# Patient Record
Sex: MALE | Race: WHITE | NOT HISPANIC OR LATINO | Employment: OTHER | URBAN - NONMETROPOLITAN AREA
[De-identification: names, ages, dates, MRNs, and addresses within clinical notes are randomized per-mention and may not be internally consistent; named-entity substitution may affect disease eponyms.]

---

## 2017-01-12 ENCOUNTER — OFFICE VISIT (OUTPATIENT)
Dept: INTERNAL MEDICINE | Facility: CLINIC | Age: 72
End: 2017-01-12

## 2017-01-12 VITALS
DIASTOLIC BLOOD PRESSURE: 68 MMHG | WEIGHT: 221 LBS | TEMPERATURE: 97.8 F | OXYGEN SATURATION: 96 % | BODY MASS INDEX: 30.94 KG/M2 | HEIGHT: 71 IN | HEART RATE: 42 BPM | SYSTOLIC BLOOD PRESSURE: 120 MMHG

## 2017-01-12 DIAGNOSIS — I10 ESSENTIAL HYPERTENSION: ICD-10-CM

## 2017-01-12 DIAGNOSIS — K21.9 GASTROESOPHAGEAL REFLUX DISEASE WITHOUT ESOPHAGITIS: ICD-10-CM

## 2017-01-12 DIAGNOSIS — Z51.81 ENCOUNTER FOR THERAPEUTIC DRUG LEVEL MONITORING: ICD-10-CM

## 2017-01-12 DIAGNOSIS — G47.00 INSOMNIA, UNSPECIFIED TYPE: ICD-10-CM

## 2017-01-12 DIAGNOSIS — Z02.83 ENCOUNTER FOR DRUG SCREENING: Primary | ICD-10-CM

## 2017-01-12 DIAGNOSIS — F41.1 GENERALIZED ANXIETY DISORDER: ICD-10-CM

## 2017-01-12 DIAGNOSIS — E78.5 HYPERLIPIDEMIA, UNSPECIFIED HYPERLIPIDEMIA TYPE: ICD-10-CM

## 2017-01-12 PROCEDURE — 96127 BRIEF EMOTIONAL/BEHAV ASSMT: CPT | Performed by: INTERNAL MEDICINE

## 2017-01-12 PROCEDURE — 99214 OFFICE O/P EST MOD 30 MIN: CPT | Performed by: INTERNAL MEDICINE

## 2017-01-12 NOTE — MR AVS SNAPSHOT
Tristan Rafy   1/12/2017 1:00 PM   Office Visit    Provider:  Marilyn K Vermeesch, MD   Department:  [facility] PRIMARY CARE   Dept Phone:  [phone]                Your Full Care Plan              Your Updated Medication List          This list is accurate as of: 1/12/17  1:37 PM.  Always use your most recent med list.                ALPRAZolam 0.25 MG tablet   Commonly known as:  XANAX   Take 1 tablet by mouth daily as needed for anxiety.       amLODIPine 10 MG tablet   Commonly known as:  NORVASC   take 1 tablet by mouth once daily       citalopram 40 MG tablet   Commonly known as:  CeleXA   Take 1 tablet by mouth daily.       clopidogrel 75 MG tablet   Commonly known as:  PLAVIX   Take 1 tablet by mouth Daily.       CRESTOR 20 MG tablet   Generic drug:  rosuvastatin       fluticasone 50 MCG/ACT nasal spray   Commonly known as:  FLONASE       isosorbide mononitrate 60 MG 24 hr tablet   Commonly known as:  IMDUR   take 1 tablet by mouth once daily       lisinopril 10 MG tablet   Commonly known as:  PRINIVIL,ZESTRIL   Take 1 tablet by mouth Daily.       metoprolol tartrate 25 MG tablet   Commonly known as:  LOPRESSOR   take 1 tablet by mouth twice a day       NITROSTAT 0.4 MG SL tablet   Generic drug:  nitroglycerin       omeprazole 20 MG capsule   Commonly known as:  priLOSEC   take 1 capsule by mouth once daily       zolpidem 5 MG tablet   Commonly known as:  AMBIEN   Take 1 tablet by mouth at night as needed for sleep.               You Were Diagnosed With        Codes Comments    Encounter for drug screening    -  Primary ICD-10-CM: Z02.83  ICD-9-CM: V72.85     Essential hypertension     ICD-10-CM: I10  ICD-9-CM: 401.9     Hyperlipidemia, unspecified hyperlipidemia type     ICD-10-CM: E78.5  ICD-9-CM: 272.4     Gastroesophageal reflux disease without esophagitis     ICD-10-CM: K21.9  ICD-9-CM: 530.81     Insomnia, unspecified type     ICD-10-CM: G47.00  ICD-9-CM:  "780.52     Generalized anxiety disorder     ICD-10-CM: F41.1  ICD-9-CM: 300.02       Instructions     None    Patient Instructions History      MyChart Signup     Our records indicate that you have declined [facility] signup. If you would like to sign up for [employer], please email [email or web address] or call [phone] to obtain an activation code.             Other Info from Your Visit           Allergies     No Known Allergies      Reason for Visit     Follow-up 2 months for HTN, GERD, and HLD. Pt states BS levels have been running low. Pt agreed to UDS.      Vital Signs     Blood Pressure Pulse Temperature Height Weight Oxygen Saturation    120/68 42 97.8 °F (36.6 °C) 71\" (180.3 cm) 221 lb (100 kg) 96%    Body Mass Index Smoking Status                30.82 kg/m2 Former Smoker          Problems and Diagnoses Noted     Acid reflux disease    Generalized anxiety disorder    High cholesterol or triglycerides    High blood pressure    Difficulty falling or staying asleep    Encounter for drug screening    -  Primary      "

## 2017-01-12 NOTE — PROGRESS NOTES
"Chief Complaint   Patient presents with   • Follow-up     2 months for HTN, GERD, and HLD. Pt states BS levels have been running low. Pt agreed to UDS.     Subjective   Tristan Hillman is a 71 y.o. male.     HPI Comments: Here for followup of anxiety, GERD, HTN, HLD and insomnia.  Labs in May of last year were all in normal range.  PH Q9 score today is 4.  Patient has been having symptoms of depression and difficulty with sleep.  He has known history of insomnia for which he takes Ambien on a regular basis.  He has had kidney stones over the past several weeks.  He has had lithotripsy and is doing better.  He was quite upset and depressed over this for a while.  These sxs are improving since stones are resolved.  He is taking celexa daily.  He is taking xanax about twice a week.  He states that he has occasional BS in the 70s and feels low, shaky.  Usually eats 3 meals a day.   No GERD, is taking prilosec daily.  No CP or SOB. HR is in the 40s.  He states it runs in the 40-60s at home.  He will see cardiologist in March.        The following portions of the patient's history were reviewed and updated as appropriate: allergies, current medications, past family history, past medical history, past social history, past surgical history and problem list.    Review of Systems   Respiratory: Negative for shortness of breath.    Cardiovascular: Negative for chest pain and palpitations.   Genitourinary: Positive for flank pain (recent kidney stones now resolved).   Psychiatric/Behavioral: Positive for dysphoric mood (improved mood over last week or so) and sleep disturbance. The patient is nervous/anxious.    All other systems reviewed and are negative.      Objective   Visit Vitals   • /68   • Pulse (!) 42   • Temp 97.8 °F (36.6 °C)   • Ht 71\" (180.3 cm)   • Wt 221 lb (100 kg)   • SpO2 96%   • BMI 30.82 kg/m2     Body mass index is 30.82 kg/(m^2).  Physical Exam   Constitutional: He is oriented to person, place, and time. " He appears well-developed and well-nourished.   HENT:   Head: Normocephalic and atraumatic.   Mouth/Throat: Oropharynx is clear and moist.   Eyes: Conjunctivae and EOM are normal. Pupils are equal, round, and reactive to light.   Neck: Normal range of motion. Neck supple. No thyromegaly present.   Cardiovascular: Regular rhythm and intact distal pulses.    Systolic murmur grade 1/6, heart rate approximately 45   Pulmonary/Chest: Effort normal and breath sounds normal. No respiratory distress.   Abdominal: Soft. Bowel sounds are normal.   Musculoskeletal: He exhibits no edema.   Lymphadenopathy:     He has no cervical adenopathy.   Neurological: He is alert and oriented to person, place, and time. No cranial nerve deficit.   Psychiatric: He has a normal mood and affect. Judgment normal.   Nursing note and vitals reviewed.      Assessment/Plan   Tristan Hillman is here today and the following problems have been addressed:      Tristan was seen today for follow-up.    Diagnoses and all orders for this visit:    Encounter for drug screening  -     Urine Drug Screen; Future    Essential hypertension    Hyperlipidemia, unspecified hyperlipidemia type    Gastroesophageal reflux disease without esophagitis    Insomnia, unspecified type    Generalized anxiety disorder    UDS done today due to use of xanax and ambien  Labs next visit  Follow HH diet and walk as tolerated  He will discuss HR and use of metoprolol with cardiologist in a few months  No change in celexa for depression at this time  Limit use of Xanax for anxiety and sleep    RTC 6 mo

## 2017-01-13 RX ORDER — ROSUVASTATIN CALCIUM 20 MG/1
TABLET, COATED ORAL
Qty: 90 TABLET | Refills: 1 | Status: SHIPPED | OUTPATIENT
Start: 2017-01-13 | End: 2018-05-08

## 2017-02-15 RX ORDER — NITROGLYCERIN 0.4 MG/1
TABLET SUBLINGUAL
Qty: 25 TABLET | Refills: 5 | Status: SHIPPED | OUTPATIENT
Start: 2017-02-15 | End: 2018-07-07 | Stop reason: SDUPTHER

## 2017-04-10 ENCOUNTER — OFFICE VISIT (OUTPATIENT)
Dept: INTERNAL MEDICINE | Facility: CLINIC | Age: 72
End: 2017-04-10

## 2017-04-10 ENCOUNTER — HOSPITAL ENCOUNTER (OUTPATIENT)
Dept: GENERAL RADIOLOGY | Facility: HOSPITAL | Age: 72
Discharge: HOME | End: 2017-04-10
Attending: INTERNAL MEDICINE | Admitting: INTERNAL MEDICINE

## 2017-04-10 VITALS
HEIGHT: 71 IN | DIASTOLIC BLOOD PRESSURE: 70 MMHG | OXYGEN SATURATION: 98 % | WEIGHT: 229 LBS | SYSTOLIC BLOOD PRESSURE: 128 MMHG | BODY MASS INDEX: 32.06 KG/M2 | TEMPERATURE: 97.5 F | HEART RATE: 47 BPM

## 2017-04-10 DIAGNOSIS — M54.50 CHRONIC LEFT-SIDED LOW BACK PAIN WITHOUT SCIATICA: ICD-10-CM

## 2017-04-10 DIAGNOSIS — R00.1 BRADYCARDIA: Primary | ICD-10-CM

## 2017-04-10 DIAGNOSIS — G89.29 CHRONIC LEFT-SIDED LOW BACK PAIN WITHOUT SCIATICA: ICD-10-CM

## 2017-04-10 DIAGNOSIS — G60.9 IDIOPATHIC PERIPHERAL NEUROPATHY: ICD-10-CM

## 2017-04-10 PROCEDURE — 72100 X-RAY EXAM L-S SPINE 2/3 VWS: CPT

## 2017-04-10 PROCEDURE — 99214 OFFICE O/P EST MOD 30 MIN: CPT | Performed by: INTERNAL MEDICINE

## 2017-04-10 PROCEDURE — 93000 ELECTROCARDIOGRAM COMPLETE: CPT | Performed by: INTERNAL MEDICINE

## 2017-04-10 RX ORDER — GABAPENTIN 300 MG/1
300 CAPSULE ORAL 2 TIMES DAILY
Qty: 60 CAPSULE | Refills: 11 | Status: SHIPPED | OUTPATIENT
Start: 2017-04-10 | End: 2017-05-26

## 2017-04-10 RX ORDER — ZOLPIDEM TARTRATE 5 MG/1
5 TABLET ORAL NIGHTLY PRN
Qty: 30 TABLET | Refills: 2 | Status: SHIPPED | OUTPATIENT
Start: 2017-04-10 | End: 2017-10-26 | Stop reason: SDUPTHER

## 2017-04-10 NOTE — PROGRESS NOTES
"Chief Complaint   Patient presents with   • Dizziness     and light headedness X 4 months. Pt also states both feet have been burning and hurting.      Subjective   Tristan Hillman is a 72 y.o. male.     HPI Comments: He is complaining of burning in feet day and night.   This has been worsening over past few yrs.   He is complaining of LBP also.   He takes advil or aleve for pain prn.  Only uses his ambien prn for sleep and often only uses a half pill.     Dizziness   This is a chronic problem. The current episode started more than 1 month ago. The problem occurs daily. The problem has been waxing and waning. Associated symptoms include numbness. Pertinent negatives include no chest pain or headaches. Associated symptoms comments: none. The symptoms are aggravated by standing and bending. He has tried nothing for the symptoms.        The following portions of the patient's history were reviewed and updated as appropriate: allergies, current medications, past family history, past medical history, past social history, past surgical history and problem list.    Review of Systems   Respiratory: Negative for shortness of breath.    Cardiovascular: Negative for chest pain and palpitations.   Musculoskeletal: Positive for back pain and gait problem.   Neurological: Positive for dizziness and numbness. Negative for headaches.        Numbness and burning of feet       Objective   /70  Pulse (!) 47  Temp 97.5 °F (36.4 °C)  Ht 71\" (180.3 cm)  Wt 229 lb (104 kg)  SpO2 98%  BMI 31.94 kg/m2  Body mass index is 31.94 kg/(m^2).  Physical Exam   Constitutional: He is oriented to person, place, and time. He appears well-developed and well-nourished.   HENT:   Head: Normocephalic and atraumatic.   Mouth/Throat: Oropharynx is clear and moist.   Eyes: Conjunctivae and EOM are normal. Pupils are equal, round, and reactive to light.   Neck: Normal range of motion. Neck supple.   Cardiovascular: Normal rate, regular rhythm, normal " heart sounds and intact distal pulses.    No murmur heard.  Occasional ectopic beat   Pulmonary/Chest: Effort normal and breath sounds normal. No respiratory distress.   Musculoskeletal: He exhibits no edema.   Pain over left sacroiliac joint, pain into left sacroiliac area with left straight leg raise, pain with left hip inversion and eversion into left sacroiliac area, no foot drop, muscle strength normal in lower extremities, sensation grossly intact   Neurological: He is alert and oriented to person, place, and time. No cranial nerve deficit.   Psychiatric: He has a normal mood and affect. Judgment normal.   Nursing note and vitals reviewed.      ECG 12 Lead  Date/Time: 4/10/2017 12:57 PM  Performed by: VERMEESCH, MARILYN K  Authorized by: VERMEESCH, MARILYN K   Comparison: not compared with previous ECG   Rhythm: sinus bradycardia  Rate: bradycardic  BPM: 45  Conduction: conduction normal  ST Segments: ST segments normal  T depression: III and aVF  Other findings: PRWP  Clinical impression: abnormal ECG  Comments: Significant bradycardia noted            Assessment/Plan   Tristan Hillman is here today and the following problems have been addressed:      Tristan was seen today for dizziness.    Diagnoses and all orders for this visit:    Bradycardia    Chronic left-sided low back pain without sciatica  -     XR Spine Lumbar 2 or 3 View; Future    Idiopathic peripheral neuropathy  -     XR Spine Lumbar 2 or 3 View; Future    Other orders  -     gabapentin (NEURONTIN) 300 MG capsule; Take 1 capsule by mouth 2 (Two) Times a Day.  -     zolpidem (AMBIEN) 5 MG tablet; Take 1 tablet by mouth At Night As Needed for Sleep.    Will have patient discontinue metoprolol at this time and monitor blood pressure and heart rate until next visit  If heart rate is not improved he will need to see cardiologist for possible pacemaker placement at that time  X-ray of lumbar spine ordered today  Start gabapentin 300 mg, take 1 at bedtime  for 3 days then increase to twice daily dosing  Refill given on Ambien as noted above  Will call patient with x-ray results and decide on further evaluation based on results    Return to clinic in 6 weeks for follow-up  Please note that portions of this note were completed with a voice recognition program.  Efforts were made to edit dictation, but occasionally words are mistranscribed.

## 2017-04-12 RX ORDER — AMLODIPINE BESYLATE 10 MG/1
TABLET ORAL
Qty: 90 TABLET | Refills: 1 | Status: SHIPPED | OUTPATIENT
Start: 2017-04-12 | End: 2017-11-29 | Stop reason: SDUPTHER

## 2017-04-13 ENCOUNTER — TELEPHONE (OUTPATIENT)
Dept: INTERNAL MEDICINE | Facility: CLINIC | Age: 72
End: 2017-04-13

## 2017-04-13 NOTE — TELEPHONE ENCOUNTER
----- Message from Marilyn K Vermeesch, MD sent at 4/11/2017 12:45 PM EDT -----  Please tell patient that x-ray of his spine reveals severe degenerative disc disease and arthritis with multiple bone spurs with bridging of spurs meaning these are growing together.  If he would like I can order MRI of lumbar spine and possibly afte  r that a referral to neurosurgeon.  We can wait to see how gabapentin is working for his pain and do follow-up visit or try physical therapy if he would like.

## 2017-05-03 RX ORDER — ISOSORBIDE MONONITRATE 60 MG/1
TABLET, EXTENDED RELEASE ORAL
Qty: 90 TABLET | Refills: 1 | Status: SHIPPED | OUTPATIENT
Start: 2017-05-03 | End: 2017-11-02 | Stop reason: SDUPTHER

## 2017-05-26 ENCOUNTER — OFFICE VISIT (OUTPATIENT)
Dept: INTERNAL MEDICINE | Facility: CLINIC | Age: 72
End: 2017-05-26

## 2017-05-26 VITALS
DIASTOLIC BLOOD PRESSURE: 66 MMHG | OXYGEN SATURATION: 98 % | TEMPERATURE: 98.1 F | HEART RATE: 55 BPM | BODY MASS INDEX: 32.13 KG/M2 | WEIGHT: 229.5 LBS | SYSTOLIC BLOOD PRESSURE: 138 MMHG | HEIGHT: 71 IN

## 2017-05-26 DIAGNOSIS — M54.50 CHRONIC LEFT-SIDED LOW BACK PAIN WITHOUT SCIATICA: Primary | ICD-10-CM

## 2017-05-26 DIAGNOSIS — G89.29 CHRONIC LEFT-SIDED LOW BACK PAIN WITHOUT SCIATICA: Primary | ICD-10-CM

## 2017-05-26 DIAGNOSIS — R00.1 BRADYCARDIA: ICD-10-CM

## 2017-05-26 PROCEDURE — 99213 OFFICE O/P EST LOW 20 MIN: CPT | Performed by: INTERNAL MEDICINE

## 2017-05-26 RX ORDER — ALPRAZOLAM 0.25 MG/1
0.25 TABLET ORAL DAILY PRN
Qty: 30 TABLET | Refills: 2 | Status: SHIPPED | OUTPATIENT
Start: 2017-05-26 | End: 2017-12-27 | Stop reason: SDUPTHER

## 2017-08-03 RX ORDER — OMEPRAZOLE 20 MG/1
CAPSULE, DELAYED RELEASE ORAL
Qty: 90 CAPSULE | Refills: 3 | Status: SHIPPED | OUTPATIENT
Start: 2017-08-03 | End: 2018-08-02 | Stop reason: SDUPTHER

## 2017-10-26 RX ORDER — ZOLPIDEM TARTRATE 5 MG/1
TABLET ORAL
Qty: 30 TABLET | Refills: 2 | Status: SHIPPED | OUTPATIENT
Start: 2017-10-26 | End: 2018-05-08 | Stop reason: SDUPTHER

## 2017-11-02 RX ORDER — ISOSORBIDE MONONITRATE 60 MG/1
TABLET, EXTENDED RELEASE ORAL
Qty: 90 TABLET | Refills: 1 | Status: SHIPPED | OUTPATIENT
Start: 2017-11-02 | End: 2017-11-13 | Stop reason: SDUPTHER

## 2017-11-13 RX ORDER — ISOSORBIDE MONONITRATE 60 MG/1
60 TABLET, EXTENDED RELEASE ORAL DAILY
Qty: 90 TABLET | Refills: 0 | Status: SHIPPED | OUTPATIENT
Start: 2017-11-13 | End: 2018-05-08 | Stop reason: SDUPTHER

## 2017-11-13 RX ORDER — ISOSORBIDE MONONITRATE 60 MG/1
TABLET, EXTENDED RELEASE ORAL
Qty: 90 TABLET | Refills: 1 | OUTPATIENT
Start: 2017-11-13

## 2017-11-29 RX ORDER — AMLODIPINE BESYLATE 10 MG/1
10 TABLET ORAL DAILY
Qty: 90 TABLET | Refills: 0 | Status: SHIPPED | OUTPATIENT
Start: 2017-11-29 | End: 2018-03-22 | Stop reason: SDUPTHER

## 2017-11-29 RX ORDER — AMLODIPINE BESYLATE 10 MG/1
TABLET ORAL
Qty: 90 TABLET | Refills: 1 | OUTPATIENT
Start: 2017-11-29

## 2017-12-28 RX ORDER — ALPRAZOLAM 0.25 MG/1
0.25 TABLET ORAL DAILY PRN
Qty: 30 TABLET | Refills: 0 | OUTPATIENT
Start: 2017-12-28 | End: 2018-04-25 | Stop reason: SDUPTHER

## 2017-12-28 RX ORDER — ALPRAZOLAM 0.25 MG/1
TABLET ORAL
Qty: 30 TABLET | Refills: 0 | Status: SHIPPED | OUTPATIENT
Start: 2017-12-28 | End: 2017-12-28 | Stop reason: SDUPTHER

## 2018-03-14 ENCOUNTER — OFFICE VISIT (OUTPATIENT)
Dept: INTERNAL MEDICINE | Facility: CLINIC | Age: 73
End: 2018-03-14

## 2018-03-14 VITALS
TEMPERATURE: 97.7 F | DIASTOLIC BLOOD PRESSURE: 70 MMHG | OXYGEN SATURATION: 97 % | SYSTOLIC BLOOD PRESSURE: 132 MMHG | HEART RATE: 53 BPM

## 2018-03-14 DIAGNOSIS — M25.50 ARTHRALGIA OF MULTIPLE JOINTS: Primary | ICD-10-CM

## 2018-03-14 DIAGNOSIS — F41.1 GENERALIZED ANXIETY DISORDER: ICD-10-CM

## 2018-03-14 DIAGNOSIS — E78.2 MIXED HYPERLIPIDEMIA: ICD-10-CM

## 2018-03-14 DIAGNOSIS — I10 ESSENTIAL HYPERTENSION: ICD-10-CM

## 2018-03-14 DIAGNOSIS — R10.9 STOMACH PAIN: ICD-10-CM

## 2018-03-14 PROCEDURE — 99214 OFFICE O/P EST MOD 30 MIN: CPT | Performed by: INTERNAL MEDICINE

## 2018-03-14 RX ORDER — DULOXETIN HYDROCHLORIDE 20 MG/1
20 CAPSULE, DELAYED RELEASE ORAL DAILY
Qty: 30 CAPSULE | Refills: 1 | Status: CANCELLED | OUTPATIENT
Start: 2018-03-14

## 2018-03-14 RX ORDER — DULOXETIN HYDROCHLORIDE 20 MG/1
20 CAPSULE, DELAYED RELEASE ORAL DAILY
Qty: 60 CAPSULE | Refills: 5 | Status: SHIPPED | OUTPATIENT
Start: 2018-03-14 | End: 2018-05-08 | Stop reason: SDUPTHER

## 2018-03-14 NOTE — PROGRESS NOTES
Chief Complaint   Patient presents with   • Abstract     Pt states his whole body is sore. Also states his stomach has been bothering him.      Subjective   Tristan Hillman is a 72 y.o. male.     Pt here with complaints of stomach problems. Pain is around umbilicus.  No GERD.  Has had pain for about 6 mo.  He is compliant with omeprazole on empty stomach. He denies any black stools or blood in stools.  Pain only bothers him with movement.   He weight is stable.  Last colonoscopy was 9/2016 and revealed tubular adenoma per Dr Luna, recommends repeat in 2019.     Also complains of diffuse body aches.  This has been for 8 months or longer.  No joint swellings, redness or warmth.  Joints are painful diffusely.  Legs hurt at night.  He has tried aleve and IBU, not helpful. He does not want pain meds.  Evaluation for joint pain has been done in 2016 and all labs were negative.    BP is controlled today.  No CP, SOB, edema.  Home reads run in 120/70s.  He is compliant with meds.  He would like labs done for routine visit at this time also as he is fasting today.  He has not been in for his follow-up appointments for routine visits in almost a year.  He has a follow up with cardiologist soon.   Anxiety is well controlled with celexa.  He complains of area of itching on right forearm from recent skin tear that has small scab.  He has tried over-the-counter anti-itch medication on occasion but scab continues to itch.         The following portions of the patient's history were reviewed and updated as appropriate: allergies, current medications, past family history, past medical history, past social history, past surgical history and problem list.    Review of Systems   Constitutional: Negative for unexpected weight change.   Respiratory: Negative for shortness of breath.    Cardiovascular: Negative for chest pain, palpitations and leg swelling.   Gastrointestinal: Positive for abdominal distention and abdominal pain. Negative  for blood in stool, constipation, diarrhea, nausea and vomiting.   Musculoskeletal: Positive for arthralgias and back pain. Negative for joint swelling.   Skin: Negative for rash.   All other systems reviewed and are negative.      Objective   /70   Pulse 53   Temp 97.7 °F (36.5 °C)   SpO2 97%   There is no height or weight on file to calculate BMI.  Physical Exam   Constitutional: He is oriented to person, place, and time. He appears well-developed and well-nourished.   HENT:   Head: Normocephalic and atraumatic.   Mouth/Throat: Oropharynx is clear and moist.   Eyes: Conjunctivae and EOM are normal. Pupils are equal, round, and reactive to light.   Neck: Normal range of motion. Neck supple. No thyromegaly present.   Cardiovascular: Normal rate, regular rhythm, normal heart sounds and intact distal pulses.    No murmur heard.  Pulmonary/Chest: Effort normal and breath sounds normal. No respiratory distress.   Abdominal: Soft. Bowel sounds are normal. He exhibits distension. He exhibits no mass. There is tenderness. There is no rebound and no guarding. No hernia.   Mild tenderness in epigastric and umbilical area with no rebound or guarding, no masses   Musculoskeletal: He exhibits no edema.   Knee joints with difficulty on full extension, but no significant crepitus noted, no inflammation or redness of any joints appreciated, no significant synovial thickening over hands noted   Lymphadenopathy:     He has no cervical adenopathy.   Neurological: He is alert and oriented to person, place, and time. No cranial nerve deficit.   Skin:   Right forearm with areas of scarring from prior skin tears, one small 2 mm scab noted from recent skin tear that is causing significant itching   Psychiatric: He has a normal mood and affect. Judgment normal.   Nursing note and vitals reviewed.      Assessment/Plan   Tristan Hillman is here today and the following problems have been addressed:      Tristan was seen today for  abstract.    Diagnoses and all orders for this visit:    Arthralgia of multiple joints  -     C-reactive Protein  -     Rheumatoid Factor  -     Sedimentation Rate  -     Cyclic Citrul Peptide Antibody, IgG / IgA  -     OZZIE    Stomach pain  -     Comprehensive Metabolic Panel  -     CBC & Differential    Mixed hyperlipidemia  -     Comprehensive Metabolic Panel  -     Lipid Panel    Essential hypertension  -     CBC & Differential    Generalized anxiety disorder    Other orders  -     Cancel: DULoxetine (CYMBALTA) 20 MG capsule; Take 1 capsule by mouth Daily.  -     DULoxetine (CYMBALTA) 20 MG capsule; Take 1 capsule by mouth Daily.    Follow heart healthy/low salt diet  Avoid processed foods  Monitor blood pressure as discussed  Exercise as tolerated up to 30 minutes 5 days per week  Take all medications as prescribed  Labs as noted  Given duloxetine 20 mg q day for joint pains  Continue Celexa and when necessary Xanax for anxiety  Follow-up with cardiologist as scheduled  Recommend over-the-counter hydrocortisone 1% 3 times daily to scab on right forearm    RTC 2 mo for follow up of joint pains    Please note that portions of this note were completed with a voice recognition program.  Efforts were made to edit dictation, but occasionally words are mistranscribed.

## 2018-03-15 NOTE — PROGRESS NOTES
Please tell patient that all labs are in normal range including rheumatoid and lupus labs.  The inflammatory labs are also normal.  There is one rheumatoid test pending, but the other one is negative.  We will call him with the last test result if it is positive, otherwise if he does not hear from us that means it was negative.  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

## 2018-03-16 ENCOUNTER — TELEPHONE (OUTPATIENT)
Dept: INTERNAL MEDICINE | Facility: CLINIC | Age: 73
End: 2018-03-16

## 2018-03-16 NOTE — TELEPHONE ENCOUNTER
----- Message from Marilyn K Vermeesch, MD sent at 3/15/2018  4:59 PM EDT -----  Please tell patient that all labs are in normal range including rheumatoid and lupus labs.  The inflammatory labs are also normal.  There is one rheumatoid test pending, but the other one is negative.  We will call him with the last test result if it   is positive, otherwise if he does not hear from us that means it was negative.  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

## 2018-03-17 LAB
ALBUMIN SERPL-MCNC: 3.8 G/DL (ref 3.5–5)
ALBUMIN/GLOB SERPL: 1.4 G/DL (ref 1–2)
ALP SERPL-CCNC: 79 U/L (ref 38–126)
ALT SERPL-CCNC: 31 U/L (ref 13–69)
ANA SER QL: NEGATIVE
AST SERPL-CCNC: 19 U/L (ref 15–46)
BASOPHILS # BLD AUTO: 0.09 10*3/MM3 (ref 0–0.2)
BASOPHILS NFR BLD AUTO: 1.4 % (ref 0–2.5)
BILIRUB SERPL-MCNC: 0.6 MG/DL (ref 0.2–1.3)
BUN SERPL-MCNC: 17 MG/DL (ref 7–20)
BUN/CREAT SERPL: 18.9 (ref 6.3–21.9)
CALCIUM SERPL-MCNC: 9.4 MG/DL (ref 8.4–10.2)
CCP IGA+IGG SERPL IA-ACNC: 7 UNITS (ref 0–19)
CHLORIDE SERPL-SCNC: 106 MMOL/L (ref 98–107)
CHOLEST SERPL-MCNC: 180 MG/DL (ref 0–199)
CO2 SERPL-SCNC: 27 MMOL/L (ref 26–30)
CREAT SERPL-MCNC: 0.9 MG/DL (ref 0.6–1.3)
CRP SERPL-MCNC: 0.7 MG/DL (ref 0–1)
EOSINOPHIL # BLD AUTO: 0.24 10*3/MM3 (ref 0–0.7)
EOSINOPHIL NFR BLD AUTO: 3.8 % (ref 0–7)
ERYTHROCYTE [DISTWIDTH] IN BLOOD BY AUTOMATED COUNT: 11.9 % (ref 11.5–14.5)
ERYTHROCYTE [SEDIMENTATION RATE] IN BLOOD BY WESTERGREN METHOD: 10 MM/HR (ref 0–15)
GFR SERPLBLD CREATININE-BSD FMLA CKD-EPI: 101 ML/MIN/1.73
GFR SERPLBLD CREATININE-BSD FMLA CKD-EPI: 83 ML/MIN/1.73
GLOBULIN SER CALC-MCNC: 2.8 GM/DL
GLUCOSE SERPL-MCNC: 96 MG/DL (ref 74–98)
HCT VFR BLD AUTO: 45.3 % (ref 42–52)
HDLC SERPL-MCNC: 39 MG/DL (ref 40–60)
HGB BLD-MCNC: 15.4 G/DL (ref 14–18)
IMM GRANULOCYTES # BLD: 0.02 10*3/MM3 (ref 0–0.06)
IMM GRANULOCYTES NFR BLD: 0.3 % (ref 0–0.6)
LDLC SERPL CALC-MCNC: 118 MG/DL (ref 0–99)
LYMPHOCYTES # BLD AUTO: 1.51 10*3/MM3 (ref 0.6–3.4)
LYMPHOCYTES NFR BLD AUTO: 23.6 % (ref 10–50)
MCH RBC QN AUTO: 30.3 PG (ref 27–31)
MCHC RBC AUTO-ENTMCNC: 34 G/DL (ref 30–37)
MCV RBC AUTO: 89.2 FL (ref 80–94)
MONOCYTES # BLD AUTO: 0.69 10*3/MM3 (ref 0–0.9)
MONOCYTES NFR BLD AUTO: 10.8 % (ref 0–12)
NEUTROPHILS # BLD AUTO: 3.84 10*3/MM3 (ref 2–6.9)
NEUTROPHILS NFR BLD AUTO: 60.1 % (ref 37–80)
NRBC BLD AUTO-RTO: 0 /100 WBC (ref 0–0)
PLATELET # BLD AUTO: 158 10*3/MM3 (ref 130–400)
POTASSIUM SERPL-SCNC: 4.3 MMOL/L (ref 3.5–5.1)
PROT SERPL-MCNC: 6.6 G/DL (ref 6.3–8.2)
RBC # BLD AUTO: 5.08 10*6/MM3 (ref 4.7–6.1)
RHEUMATOID FACT SERPL-ACNC: <10 IU/ML (ref 0–13.9)
SODIUM SERPL-SCNC: 145 MMOL/L (ref 137–145)
TRIGL SERPL-MCNC: 113 MG/DL
VLDLC SERPL CALC-MCNC: 22.6 MG/DL
WBC # BLD AUTO: 6.39 10*3/MM3 (ref 4.8–10.8)

## 2018-03-22 RX ORDER — AMLODIPINE BESYLATE 10 MG/1
TABLET ORAL
Qty: 90 TABLET | Refills: 0 | Status: SHIPPED | OUTPATIENT
Start: 2018-03-22 | End: 2018-05-08 | Stop reason: SDUPTHER

## 2018-04-23 ENCOUNTER — OFFICE VISIT (OUTPATIENT)
Dept: INTERNAL MEDICINE | Facility: CLINIC | Age: 73
End: 2018-04-23

## 2018-04-23 VITALS
TEMPERATURE: 98.1 F | WEIGHT: 223 LBS | HEART RATE: 77 BPM | HEIGHT: 71 IN | OXYGEN SATURATION: 98 % | DIASTOLIC BLOOD PRESSURE: 84 MMHG | SYSTOLIC BLOOD PRESSURE: 130 MMHG | BODY MASS INDEX: 31.22 KG/M2

## 2018-04-23 DIAGNOSIS — L57.0 ACTINIC KERATOSIS: Primary | ICD-10-CM

## 2018-04-23 PROCEDURE — 99213 OFFICE O/P EST LOW 20 MIN: CPT | Performed by: PHYSICIAN ASSISTANT

## 2018-04-23 RX ORDER — TRIAMCINOLONE ACETONIDE 0.25 MG/G
CREAM TOPICAL 2 TIMES DAILY
Qty: 45 G | Refills: 0 | Status: SHIPPED | OUTPATIENT
Start: 2018-04-23 | End: 2021-02-08 | Stop reason: SDUPTHER

## 2018-04-23 NOTE — PROGRESS NOTES
"Subjective     Chief Complaint: Rash    History of Present Illness     Tristan Hillman is a 73 y.o. male presenting with complaints of multiple flaky, red, occasionally itchy, occasionally bleeding areas to bilateral forearms and left shin.  Patient states these have been present for at least 6 months, feels like they're worsening.  He has not seen dermatology in the past.  He also notes a mild rash to chest.    The following portions of the patient's history were reviewed and updated as appropriate: current medications, allergies, PMH.    Review of Systems   Skin: Positive for rash. Negative for color change, pallor and wound.     Objective     Vitals:    04/23/18 1029   BP: 130/84   Pulse: 77   Temp: 98.1 °F (36.7 °C)   SpO2: 98%   Weight: 101 kg (223 lb)   Height: 180.3 cm (70.98\")     Physical Exam   Constitutional: He appears well-developed and well-nourished.   Cardiovascular: Normal rate and regular rhythm.    Pulmonary/Chest: Effort normal and breath sounds normal.   Skin:        Quarter sized area to patient's left shin and right forearm. Round, flaky, some bleeding/ irritation. He also has multiple small areas of flakiness and redness to bilateral forearms. Concern for AKs vs SCC       Assessment/Plan     Diagnoses and all orders for this visit:    Actinic keratosis  -     Ambulatory Referral to Dermatology    I am concerned that this is not a rash, but these are actinic keratoses or even squamous cell carcinomas (for the one on his shin). I would like for patient to see dermatology as it is likely to large of a patch for cryotherapy. The larger patches could be areas of nummular eczema that he has irritated so much via scratching- but I am more concerned about skin cancer due to duration > 6 months.    Patient states he is going to the beach next week- highly suggested hats, staying under umbrella, wearing sunscreen.    Leonie Grullon PA-C  04/23/2018         Please note that portions of this note were " completed with a voice recognition program. Efforts were made to edit dictation, but occasionally words are mistranscribed.

## 2018-04-26 RX ORDER — ALPRAZOLAM 0.25 MG/1
TABLET ORAL
Qty: 30 TABLET | Refills: 2 | OUTPATIENT
Start: 2018-04-26 | End: 2018-10-04 | Stop reason: SDUPTHER

## 2018-05-08 ENCOUNTER — OFFICE VISIT (OUTPATIENT)
Dept: INTERNAL MEDICINE | Facility: CLINIC | Age: 73
End: 2018-05-08

## 2018-05-08 VITALS
DIASTOLIC BLOOD PRESSURE: 92 MMHG | HEIGHT: 71 IN | TEMPERATURE: 98 F | BODY MASS INDEX: 31.4 KG/M2 | HEART RATE: 97 BPM | WEIGHT: 224.25 LBS | OXYGEN SATURATION: 98 % | SYSTOLIC BLOOD PRESSURE: 142 MMHG

## 2018-05-08 DIAGNOSIS — M25.50 ARTHRALGIA OF MULTIPLE JOINTS: Primary | ICD-10-CM

## 2018-05-08 PROCEDURE — 99213 OFFICE O/P EST LOW 20 MIN: CPT | Performed by: INTERNAL MEDICINE

## 2018-05-08 RX ORDER — DULOXETINE 40 MG/1
40 CAPSULE, DELAYED RELEASE ORAL DAILY
Qty: 30 CAPSULE | Refills: 5 | Status: SHIPPED | OUTPATIENT
Start: 2018-05-08 | End: 2018-11-30 | Stop reason: SDUPTHER

## 2018-05-08 RX ORDER — AMLODIPINE BESYLATE 10 MG/1
10 TABLET ORAL DAILY
Qty: 90 TABLET | Refills: 3 | Status: SHIPPED | OUTPATIENT
Start: 2018-05-08 | End: 2018-11-30 | Stop reason: SDUPTHER

## 2018-05-08 RX ORDER — ZOLPIDEM TARTRATE 5 MG/1
5 TABLET ORAL NIGHTLY
Qty: 30 TABLET | Refills: 2 | Status: SHIPPED | OUTPATIENT
Start: 2018-05-08 | End: 2019-01-15 | Stop reason: SDUPTHER

## 2018-05-08 RX ORDER — ISOSORBIDE MONONITRATE 60 MG/1
60 TABLET, EXTENDED RELEASE ORAL DAILY
Qty: 90 TABLET | Refills: 3 | Status: SHIPPED | OUTPATIENT
Start: 2018-05-08 | End: 2019-03-07 | Stop reason: SDUPTHER

## 2018-05-08 NOTE — PROGRESS NOTES
"Chief Complaint   Patient presents with   • Follow-up     2 months for joint pains. Pt states pains are no better.      Subjective   Tristan Hillman is a 73 y.o. male.     Here for follow-up of arthralgias.  He was seen 2 months ago for same complaint.  All labs including inflammatory markers, and a, rheumatoid factor and CCP were in normal range.  Patient was started on duloxetine 20 mg daily and states this is not helping his pain.  He states aleve helps pain some, but IBU does not help. His joint pain is worse at the end of the day.    He is also complaining that his stomach is sore.  Feels like there is something in there that should not be!  This morning he had a BM in his pants.  He has loose stools on and off, some stools are formed.  He considers himself a worrier.  No black stools or blood in stool  He weight is stable.  Has a good appetite.  He eats more junk food than healthy food.    His cholesterol is slightly elevated.  He stopped taking crestor a long time ago due to muscle pain.          The following portions of the patient's history were reviewed and updated as appropriate: allergies, current medications, past family history, past medical history, past social history, past surgical history and problem list.    Review of Systems   Constitutional: Negative for activity change, appetite change and unexpected weight change.   Gastrointestinal: Positive for abdominal pain.        Chronic loose stools   Musculoskeletal: Positive for arthralgias and myalgias.   Psychiatric/Behavioral: The patient is nervous/anxious.        Objective   /92   Pulse 97   Temp 98 °F (36.7 °C)   Ht 180.3 cm (70.98\")   Wt 102 kg (224 lb 4 oz)   SpO2 98%   BMI 31.29 kg/m²   Body mass index is 31.29 kg/m².  Physical Exam   Constitutional: He is oriented to person, place, and time. He appears well-developed and well-nourished.   HENT:   Head: Normocephalic and atraumatic.   Eyes: EOM are normal. Pupils are equal, round, " and reactive to light.   Neck: Normal range of motion. Neck supple.   Cardiovascular: Normal rate, regular rhythm, normal heart sounds and intact distal pulses.    No murmur heard.  Pulmonary/Chest: Effort normal and breath sounds normal. No respiratory distress.   Abdominal: Soft. Bowel sounds are normal.   Musculoskeletal: He exhibits no edema.   Neurological: He is alert and oriented to person, place, and time. No cranial nerve deficit.   Psychiatric: He has a normal mood and affect. Judgment normal.   Nursing note and vitals reviewed.      Assessment/Plan   Tristan Hillman is here today and the following problems have been addressed:      Tristan was seen today for follow-up.    Diagnoses and all orders for this visit:    Arthralgia of multiple joints    Other orders  -     DULoxetine HCl 40 MG capsule delayed-release particles; Take 1 capsule by mouth Daily.  -     amLODIPine (NORVASC) 10 MG tablet; Take 1 tablet by mouth Daily.  -     isosorbide mononitrate (IMDUR) 60 MG 24 hr tablet; Take 1 tablet by mouth Daily. PATIENT NEEDS FOLLOW UP APPT WITH PCP FOR FURTHER REFILLS.    increase duloxetine to 40 mg q day  He may take aleve with food if needed  Reviewed last colonoscopy with pt  Encourage him to eat a healthy diet, suspect loose stools due to dietary discretion and irritable bowel syndrome due to underlying anxiety    RTC 3 mo, labs then    Please note that portions of this note were completed with a voice recognition program.  Efforts were made to edit dictation, but occasionally words are mistranscribed.

## 2018-06-12 ENCOUNTER — OFFICE VISIT (OUTPATIENT)
Dept: INTERNAL MEDICINE | Facility: CLINIC | Age: 73
End: 2018-06-12

## 2018-06-12 VITALS
OXYGEN SATURATION: 98 % | DIASTOLIC BLOOD PRESSURE: 78 MMHG | SYSTOLIC BLOOD PRESSURE: 138 MMHG | HEART RATE: 56 BPM | TEMPERATURE: 97.7 F

## 2018-06-12 DIAGNOSIS — L73.9 FOLLICULITIS: Primary | ICD-10-CM

## 2018-06-12 PROCEDURE — 99213 OFFICE O/P EST LOW 20 MIN: CPT | Performed by: INTERNAL MEDICINE

## 2018-06-12 RX ORDER — DOXYCYCLINE HYCLATE 100 MG/1
100 CAPSULE ORAL 2 TIMES DAILY WITH MEALS
Qty: 20 CAPSULE | Refills: 0 | Status: SHIPPED | OUTPATIENT
Start: 2018-06-12 | End: 2018-08-16

## 2018-06-12 NOTE — PROGRESS NOTES
Chief Complaint   Patient presents with   • Abstract     Pt states he has itchy sores/bumps on head/scalp.     Subjective   Tristan Hillman is a 73 y.o. male.     Patient is here today with complaints of skin lesions on scalp.  He has noted the sores and bumps on scalp about 3-4 months ago.  These are itchy and sore.  He has tried head and shoulders, tegrin shampoo and it has not been helpful.  He feels like when he scratches lesion they open up and drain.  No fevers or chills.    The higher dose of duloxetine is helping his joint pain a lot.           The following portions of the patient's history were reviewed and updated as appropriate: allergies, current medications, past family history, past medical history, past social history, past surgical history and problem list.    Review of Systems   Constitutional: Negative for chills and fever.   Musculoskeletal: Positive for arthralgias.   Skin: Positive for rash.        itching       Objective   /78   Pulse 56   Temp 97.7 °F (36.5 °C)   SpO2 98%   There is no height or weight on file to calculate BMI.  Physical Exam   Constitutional: He is oriented to person, place, and time. He appears well-developed and well-nourished.   HENT:   Head: Normocephalic and atraumatic.   Hearing aids in place   Pulmonary/Chest: Effort normal.   Neurological: He is alert and oriented to person, place, and time.   Skin:   Posterior and lateral scalp with scattered papules with scabbing noted within hair, top of scalp spared where there is minimal hair noted   Psychiatric: He has a normal mood and affect. His behavior is normal. Judgment and thought content normal.   Nursing note and vitals reviewed.      Assessment/Plan   Tristan Hillman is here today and the following problems have been addressed:      Tristan was seen today for abstract.    Diagnoses and all orders for this visit:    Folliculitis    Other orders  -     doxycycline (VIBRAMYCIN) 100 MG capsule; Take 1 capsule by mouth 2  (Two) Times a Day With Meals.    pt given doxycycline 100 mg BID for 10 days with food  Told him to avoid sunlight to avoid sunburn while on medicatoin    RTC for medicare wellness in 2 mo or prn if sxs worsen or persist    Please note that portions of this note were completed with a voice recognition program.  Efforts were made to edit dictation, but occasionally words are mistranscribed.

## 2018-07-09 RX ORDER — NITROGLYCERIN 0.4 MG/1
TABLET SUBLINGUAL
Qty: 25 TABLET | Refills: 5 | Status: SHIPPED | OUTPATIENT
Start: 2018-07-09

## 2018-08-02 RX ORDER — OMEPRAZOLE 20 MG/1
CAPSULE, DELAYED RELEASE ORAL
Qty: 90 CAPSULE | Refills: 3 | Status: SHIPPED | OUTPATIENT
Start: 2018-08-02 | End: 2018-11-30 | Stop reason: SDUPTHER

## 2018-08-16 ENCOUNTER — OFFICE VISIT (OUTPATIENT)
Dept: INTERNAL MEDICINE | Facility: CLINIC | Age: 73
End: 2018-08-16

## 2018-08-16 VITALS
OXYGEN SATURATION: 98 % | SYSTOLIC BLOOD PRESSURE: 128 MMHG | HEART RATE: 54 BPM | TEMPERATURE: 97.9 F | DIASTOLIC BLOOD PRESSURE: 80 MMHG

## 2018-08-16 DIAGNOSIS — S60.450A FOREIGN BODY OF RIGHT INDEX FINGER: Primary | ICD-10-CM

## 2018-08-16 DIAGNOSIS — Z23 NEED FOR VACCINE FOR DT (DIPHTHERIA-TETANUS): ICD-10-CM

## 2018-08-16 PROBLEM — L73.9 FOLLICULITIS: Status: RESOLVED | Noted: 2018-06-12 | Resolved: 2018-08-16

## 2018-08-16 PROCEDURE — 90471 IMMUNIZATION ADMIN: CPT | Performed by: INTERNAL MEDICINE

## 2018-08-16 PROCEDURE — 90715 TDAP VACCINE 7 YRS/> IM: CPT | Performed by: INTERNAL MEDICINE

## 2018-08-16 PROCEDURE — 99213 OFFICE O/P EST LOW 20 MIN: CPT | Performed by: INTERNAL MEDICINE

## 2018-08-16 RX ORDER — LISINOPRIL 20 MG/1
20 TABLET ORAL DAILY
Refills: 0 | COMMUNITY
Start: 2018-08-03 | End: 2018-11-30 | Stop reason: SDUPTHER

## 2018-08-16 RX ORDER — CEPHALEXIN 500 MG/1
500 CAPSULE ORAL 2 TIMES DAILY
Qty: 14 CAPSULE | Refills: 0 | Status: SHIPPED | OUTPATIENT
Start: 2018-08-16 | End: 2018-08-21

## 2018-08-16 NOTE — PROGRESS NOTES
Chief Complaint   Patient presents with   • Abstract     Pt would like pointer finger on right hand looked at.      Subjective   Tristan Hillman is a 73 y.o. male.     Pt here today with possible FB in index finger of right hand.   He was working on air compressor at home.  He was draining water out of tank, he thinks there was rust mixed with it.    The valve on bottom of tank was rusted, he was using a screw  to open valve and a burst of air came out with rust/water- this burst of air was directed at right index finger lateral aspect.  There are multiple black/Brown flecks over PIP joint area.  Patient has washed area with soap and water.  Area is quite tender to touch.         The following portions of the patient's history were reviewed and updated as appropriate: allergies, current medications, past family history, past medical history, past social history, past surgical history and problem list.    Review of Systems   Constitutional: Negative for chills and fever.   Skin:        Foreign body right index finger       Objective   /80   Pulse 54   Temp 97.9 °F (36.6 °C)   SpO2 98%   There is no height or weight on file to calculate BMI.  Physical Exam   Constitutional: He is oriented to person, place, and time. He appears well-developed and well-nourished.   HENT:   Head: Normocephalic and atraumatic.   Bilateral hearing aids   Pulmonary/Chest: Effort normal.   Musculoskeletal:   Right medial index finger from MCP joint to tip of finger with multiple flecks of black/brown small foreign bodies noted.  Hand was washed with soap and water, then alcohol prep pad was used to clean over index finger.  An 18-gauge needle was used to remove multiple foreign bodies from finger.  There was still a large cluster of pinpoint size form bodies near PIP joint.   Neurological: He is alert and oriented to person, place, and time.   Psychiatric: He has a normal mood and affect. His behavior is normal. Judgment and  thought content normal.   Nursing note and vitals reviewed.      Assessment/Plan   Tristan Hillman is here today and the following problems have been addressed:      Tristan was seen today for abstract.    Diagnoses and all orders for this visit:    Foreign body of right index finger    Need for vaccine for DT (diphtheria-tetanus)    Other orders  -     cephalexin (KEFLEX) 500 MG capsule; Take 1 capsule by mouth 2 (Two) Times a Day.  -     Cancel: Tdap Vaccine Greater Than or Equal To 8yo IM  -     Td Vaccine Greater Than or Equal To 8yo With Preservative IM    Multiple small foreign bodies, likely rust removed from right index finger, however there were still many small pieces remaining  Patient encouraged to soak finger in warm Epsom salt water twice daily for 10 minutes  Hopefully this will bring remainder of foreign bodies to surface of finger and he will be able to remove these at home  Patient given Keflex 500 mg twice a day for 7 days to help prevent infection  Tetanus shot given today  Patient told to return to clinic for any evidence of infection    Please note that portions of this note were completed with a voice recognition program.  Efforts were made to edit dictation, but occasionally words are mistranscribed.

## 2018-08-21 ENCOUNTER — OFFICE VISIT (OUTPATIENT)
Dept: INTERNAL MEDICINE | Facility: CLINIC | Age: 73
End: 2018-08-21

## 2018-08-21 VITALS
WEIGHT: 225 LBS | HEART RATE: 63 BPM | OXYGEN SATURATION: 95 % | TEMPERATURE: 97.7 F | DIASTOLIC BLOOD PRESSURE: 78 MMHG | HEIGHT: 71 IN | SYSTOLIC BLOOD PRESSURE: 142 MMHG | BODY MASS INDEX: 31.5 KG/M2

## 2018-08-21 DIAGNOSIS — Z00.00 ENCOUNTER FOR MEDICARE ANNUAL WELLNESS EXAM: Primary | ICD-10-CM

## 2018-08-21 DIAGNOSIS — E78.2 MIXED HYPERLIPIDEMIA: ICD-10-CM

## 2018-08-21 DIAGNOSIS — G60.9 IDIOPATHIC PERIPHERAL NEUROPATHY: ICD-10-CM

## 2018-08-21 DIAGNOSIS — Z12.5 SCREENING PSA (PROSTATE SPECIFIC ANTIGEN): ICD-10-CM

## 2018-08-21 DIAGNOSIS — R73.09 OTHER ABNORMAL GLUCOSE: ICD-10-CM

## 2018-08-21 DIAGNOSIS — K21.9 GASTROESOPHAGEAL REFLUX DISEASE WITHOUT ESOPHAGITIS: ICD-10-CM

## 2018-08-21 DIAGNOSIS — I10 ESSENTIAL HYPERTENSION: ICD-10-CM

## 2018-08-21 PROBLEM — Z23 NEED FOR VACCINE FOR DT (DIPHTHERIA-TETANUS): Status: RESOLVED | Noted: 2018-08-16 | Resolved: 2018-08-21

## 2018-08-21 LAB
HBA1C MFR BLD: 5.6 %
PSA SERPL-MCNC: 1.11 NG/ML (ref 0.06–4)

## 2018-08-21 PROCEDURE — 96160 PT-FOCUSED HLTH RISK ASSMT: CPT | Performed by: INTERNAL MEDICINE

## 2018-08-21 PROCEDURE — 90732 PPSV23 VACC 2 YRS+ SUBQ/IM: CPT | Performed by: INTERNAL MEDICINE

## 2018-08-21 PROCEDURE — G0009 ADMIN PNEUMOCOCCAL VACCINE: HCPCS | Performed by: INTERNAL MEDICINE

## 2018-08-21 PROCEDURE — 99397 PER PM REEVAL EST PAT 65+ YR: CPT | Performed by: INTERNAL MEDICINE

## 2018-08-21 PROCEDURE — G0439 PPPS, SUBSEQ VISIT: HCPCS | Performed by: INTERNAL MEDICINE

## 2018-08-21 NOTE — PROGRESS NOTES
QUICK REFERENCE INFORMATION:  The ABCs of the Annual Wellness Visit    Subsequent Medicare Wellness Visit    HEALTH RISK ASSESSMENT    1945    Recent Hospitalizations:  No hospitalization(s) within the last year..        Current Medical Providers:  Patient Care Team:  Vermeesch, Marilyn K, MD as PCP - General  Vermeesch, Marilyn K, MD as PCP - Family Medicine  Gera Santos MD as PCP - Claims Attributed        Smoking Status:  History   Smoking Status   • Former Smoker   Smokeless Tobacco   • Never Used       Alcohol Consumption:  History   Alcohol Use   • Yes       Depression Screen:   PHQ-2/PHQ-9 Depression Screening 8/21/2018   Little interest or pleasure in doing things 0   Feeling down, depressed, or hopeless 1   Trouble falling or staying asleep, or sleeping too much -   Feeling tired or having little energy -   Poor appetite or overeating -   Feeling bad about yourself - or that you are a failure or have let yourself or your family down -   Trouble concentrating on things, such as reading the newspaper or watching television -   Moving or speaking so slowly that other people could have noticed. Or the opposite - being so fidgety or restless that you have been moving around a lot more than usual -   Thoughts that you would be better off dead, or of hurting yourself in some way -   Total Score 1   If you checked off any problems, how difficult have these problems made it for you to do your work, take care of things at home, or get along with other people? -       Health Habits and Functional and Cognitive Screening:  Functional & Cognitive Status 8/21/2018   Do you have difficulty preparing food and eating? No   Do you have difficulty bathing yourself, getting dressed or grooming yourself? No   Do you have difficulty using the toilet? No   Do you have difficulty moving around from place to place? Yes   Do you have trouble with steps or getting out of a bed or a chair? Yes   In the past year have you  fallen or experienced a near fall? Yes   Current Diet Limited Junk Food   Dental Exam Not up to date   Eye Exam Up to date   Exercise (times per week) 2 times per week   Current Exercise Activities Include Gardening   Do you need help using the phone?  No   Are you deaf or do you have serious difficulty hearing?  Yes   Do you need help with transportation? No   Do you need help shopping? No   Do you need help preparing meals?  No   Do you need help with housework?  No   Do you need help with laundry? No   Do you need help taking your medications? No   Do you need help managing money? No   Do you ever drive or ride in a car without wearing a seat belt? No   Have you felt unusual stress, anger or loneliness in the last month? Yes   Who do you live with? Spouse   If you need help, do you have trouble finding someone available to you? No   Do you have difficulty concentrating, remembering or making decisions? Yes           Does the patient have evidence of cognitive impairment? No    Aspirin use counseling: Taking ASA appropriately as indicated      Recent Lab Results:  CMP:  Lab Results   Component Value Date    GLU 96 03/14/2018    BUN 17 03/14/2018    CREATININE 0.90 03/14/2018    EGFRIFNONA 83 03/14/2018    EGFRIFAFRI 101 03/14/2018    BCR 18.9 03/14/2018     03/14/2018    K 4.3 03/14/2018    CO2 27.0 03/14/2018    CALCIUM 9.4 03/14/2018    PROTENTOTREF 6.6 03/14/2018    ALBUMIN 3.80 03/14/2018    LABGLOBREF 2.8 03/14/2018    LABIL2 1.4 03/14/2018    BILITOT 0.6 03/14/2018    ALKPHOS 79 03/14/2018    AST 19 03/14/2018    ALT 31 03/14/2018     Lipid Panel:  Lab Results   Component Value Date    TRIG 113 03/14/2018    HDL 39 (L) 03/14/2018    VLDL 22.6 03/14/2018     HbA1c:  Lab Results   Component Value Date    HGBA1C 5.10 08/22/2016       Visual Acuity:  No exam data present    Age-appropriate Screening Schedule:  Refer to the list below for future screening recommendations based on patient's age, sex and/or  medical conditions. Orders for these recommended tests are listed in the plan section. The patient has been provided with a written plan.    Health Maintenance   Topic Date Due   • ZOSTER VACCINE (1 of 2) 04/02/1997   • PNEUMOCOCCAL VACCINES (65+ LOW/MEDIUM RISK) (2 of 2 - PPSV23) 09/26/2017   • INFLUENZA VACCINE  08/01/2018   • LIPID PANEL  03/14/2019   • COLONOSCOPY  08/31/2026   • TDAP/TD VACCINES  Excluded        Subjective   History of Present Illness    Tristan Hillman is a 73 y.o. male who presents for an Subsequent Wellness Visit.  PMH of HLD, HTN, GERD, LBP, anxiety and insomnia. His BP is elevated today.  It may run higher at home.  He does take lisinopril PM and amlodipine in AM.  He does not follow a good diet for HLD.  He is very active, but does not exercise.  No GERD sxs.  He takes 1/2 ambien at night and sleeps about 3 hours. He feels his anxiety is well controlled on current meds. He urinated 6 times last night, he has appt with urologist next month.     The following portions of the patient's history were reviewed and updated as appropriate: allergies, current medications, past family history, past medical history, past social history, past surgical history and problem list.    Outpatient Medications Prior to Visit   Medication Sig Dispense Refill   • ALPRAZolam (XANAX) 0.25 MG tablet take 1 tablet by mouth once daily 30 tablet 2   • amLODIPine (NORVASC) 10 MG tablet Take 1 tablet by mouth Daily. 90 tablet 3   • citalopram (CeleXA) 40 MG tablet Take 1 tablet by mouth daily. 30 tablet 3   • DULoxetine HCl 40 MG capsule delayed-release particles Take 1 capsule by mouth Daily. 30 capsule 5   • fluticasone (FLONASE) 50 MCG/ACT nasal spray 21 sprays into each nostril daily.     • isosorbide mononitrate (IMDUR) 60 MG 24 hr tablet Take 1 tablet by mouth Daily. PATIENT NEEDS FOLLOW UP APPT WITH PCP FOR FURTHER REFILLS. 90 tablet 3   • lisinopril (PRINIVIL,ZESTRIL) 20 MG tablet Take 20 mg by mouth Daily.  0   •  nitroglycerin (NITROSTAT) 0.4 MG SL tablet place 1 tablet under the tongue if needed every 5 minutes fo 25 tablet 5   • omeprazole (priLOSEC) 20 MG capsule take 1 capsule by mouth once daily 90 capsule 3   • triamcinolone (KENALOG) 0.025 % cream Apply  topically 2 (Two) Times a Day. To rash 45 g 0   • zolpidem (AMBIEN) 5 MG tablet Take 1 tablet by mouth Every Night. for sleep. 30 tablet 2   • cephalexin (KEFLEX) 500 MG capsule Take 1 capsule by mouth 2 (Two) Times a Day. 14 capsule 0   • clopidogrel (PLAVIX) 75 MG tablet Take 1 tablet by mouth Daily. 90 tablet 3     No facility-administered medications prior to visit.        Patient Active Problem List   Diagnosis   • Arthralgia of multiple joints   • Generalized anxiety disorder   • Gastroesophageal reflux disease without esophagitis   • Hyperlipidemia   • Hypertension   • Insomnia   • Screening PSA (prostate specific antigen)   • Bradycardia   • Chronic left-sided low back pain without sciatica   • Idiopathic peripheral neuropathy   • Stomach pain   • Foreign body of right index finger   • Encounter for Medicare annual wellness exam       Advance Care Planning:  has NO advance directive - information provided to the patient today    Identification of Risk Factors:  Risk factors include: weight , unhealthy diet, cardiovascular risk, inactivity, chronic pain and hearing limitations.    Review of Systems   Constitutional: Positive for diaphoresis.   HENT: Positive for hearing loss, postnasal drip and tinnitus.    Eyes: Positive for itching.        Dry eyes   Respiratory: Negative.    Cardiovascular: Positive for leg swelling.   Gastrointestinal: Negative.    Endocrine: Negative.    Genitourinary: Positive for frequency.        Frequency at night   Musculoskeletal: Positive for arthralgias, back pain and neck pain.   Skin: Negative.    Allergic/Immunologic: Negative.    Neurological: Positive for dizziness and headaches.   Hematological: Bruises/bleeds easily.  "  Psychiatric/Behavioral: Positive for sleep disturbance.       Compared to one year ago, the patient feels his physical health is the same.  Compared to one year ago, the patient feels his mental health is the same.    Objective     Physical Exam   Constitutional: He is oriented to person, place, and time. He appears well-developed and well-nourished.   Very pleasant gentleman in no apparent distress, appears slightly dizzy with rapid changes in position   HENT:   Head: Normocephalic and atraumatic.   Mouth/Throat: Oropharynx is clear and moist.   Hearing aids in place, these were removed.  He has tube in right tympanic membrane, however tympanic membrane is perforated overtop of tube placement, left tympanic membrane is normal   Eyes: Pupils are equal, round, and reactive to light. Conjunctivae and EOM are normal.   Neck: Normal range of motion. Neck supple. No thyromegaly present.   Cardiovascular: Normal rate, regular rhythm, normal heart sounds and intact distal pulses.    No murmur heard.  Pulmonary/Chest: Effort normal and breath sounds normal. No respiratory distress.   Abdominal: Soft. Bowel sounds are normal.   Musculoskeletal: He exhibits no edema.   Lymphadenopathy:     He has no cervical adenopathy.   Neurological: He is alert and oriented to person, place, and time. He displays normal reflexes. No cranial nerve deficit. Coordination normal.   Skin:   Right index finger lateral aspect with multiple black small foreign bodies noted over PIP joint   Psychiatric: He has a normal mood and affect. His behavior is normal. Judgment and thought content normal.   Nursing note and vitals reviewed.      Vitals:    08/21/18 1552   BP: 142/78   Pulse: 63   Temp: 97.7 °F (36.5 °C)   SpO2: 95%   Weight: 102 kg (225 lb)   Height: 180.3 cm (70.98\")   PainSc:   5       Patient's Body mass index is 31.4 kg/m². BMI is above normal parameters. Recommendations include: exercise counseling and nutrition " counseling.      Assessment/Plan   Patient Self-Management and Personalized Health Advice  The patient has been provided with information about: diet, exercise and designing advance directives and preventive services including:   · Diabetes screening, see lab orders, Exercise counseling provided, Glaucoma screening recommended, Nutrition counseling provided, Pneumococcal vaccine , Prostate cancer screening discussed, recommend shingles vaccines at pharmacy.    Visit Diagnoses:    ICD-10-CM ICD-9-CM   1. Encounter for Medicare annual wellness exam Z00.00 V70.0   2. Essential hypertension I10 401.9   3. Mixed hyperlipidemia E78.2 272.2   4. Gastroesophageal reflux disease without esophagitis K21.9 530.81   5. Idiopathic peripheral neuropathy G60.9 356.9   6. Screening PSA (prostate specific antigen) Z12.5 V76.44   7. Other abnormal glucose  R73.09 790.29       Orders Placed This Encounter   Procedures   • Pneumococcal Polysaccharide Vaccine 23-Valent (PPSV23) Greater Than or Equal To 3yo Subcutaneous / IM   • Hemoglobin A1c   • PSA Screen       Outpatient Encounter Prescriptions as of 8/21/2018   Medication Sig Dispense Refill   • ALPRAZolam (XANAX) 0.25 MG tablet take 1 tablet by mouth once daily 30 tablet 2   • amLODIPine (NORVASC) 10 MG tablet Take 1 tablet by mouth Daily. 90 tablet 3   • aspirin 81 MG tablet Take 1 tablet by mouth Daily. 30 tablet 0   • citalopram (CeleXA) 40 MG tablet Take 1 tablet by mouth daily. 30 tablet 3   • DULoxetine HCl 40 MG capsule delayed-release particles Take 1 capsule by mouth Daily. 30 capsule 5   • fluticasone (FLONASE) 50 MCG/ACT nasal spray 21 sprays into each nostril daily.     • isosorbide mononitrate (IMDUR) 60 MG 24 hr tablet Take 1 tablet by mouth Daily. PATIENT NEEDS FOLLOW UP APPT WITH PCP FOR FURTHER REFILLS. 90 tablet 3   • lisinopril (PRINIVIL,ZESTRIL) 20 MG tablet Take 20 mg by mouth Daily.  0   • nitroglycerin (NITROSTAT) 0.4 MG SL tablet place 1 tablet under the  tongue if needed every 5 minutes fo 25 tablet 5   • omeprazole (priLOSEC) 20 MG capsule take 1 capsule by mouth once daily 90 capsule 3   • triamcinolone (KENALOG) 0.025 % cream Apply  topically 2 (Two) Times a Day. To rash 45 g 0   • zolpidem (AMBIEN) 5 MG tablet Take 1 tablet by mouth Every Night. for sleep. 30 tablet 2   • [DISCONTINUED] cephalexin (KEFLEX) 500 MG capsule Take 1 capsule by mouth 2 (Two) Times a Day. 14 capsule 0   • [DISCONTINUED] clopidogrel (PLAVIX) 75 MG tablet Take 1 tablet by mouth Daily. 90 tablet 3     No facility-administered encounter medications on file as of 8/21/2018.        Reviewed use of high risk medication in the elderly: yes  Reviewed for potential of harmful drug interactions in the elderly: yes     Recommend melatonin or CBD oil for sleep  Given info on advanced directives and bring copy of living will for chart  Recommend shingles shot at pharmacy  Labs as noted  Recommend slow changes in position due to likely orthostatic hypotensive changes he is having due to medication side effects  Recommend that he use a small brush to his right index finger where there are multiple small foreign bodies from recent injury.  Patient has been soaking finger in Epsom salts with warm water as directed and finger is slowly improving with less foreign bodies noted, patient states that this area is no longer tender and he is able to use a small brush to this area now    Follow Up:  Return in about 4 months (around 12/21/2018) for Next scheduled follow up.     An After Visit Summary and PPPS with all of these plans were given to the patient.

## 2018-10-05 RX ORDER — ALPRAZOLAM 0.25 MG/1
TABLET ORAL
Qty: 30 TABLET | Refills: 2 | Status: SHIPPED | OUTPATIENT
Start: 2018-10-05 | End: 2019-01-24 | Stop reason: SDUPTHER

## 2018-11-29 ENCOUNTER — TELEPHONE (OUTPATIENT)
Dept: INTERNAL MEDICINE | Facility: CLINIC | Age: 73
End: 2018-11-29

## 2018-11-29 NOTE — TELEPHONE ENCOUNTER
Patient left  at 10:30am. Patient did not specify his reason for calling. Returned patient's phone call, left  to call back.  Gloria, I meant to send this to myself as a phone note! Sorry!

## 2018-11-30 ENCOUNTER — OFFICE VISIT (OUTPATIENT)
Dept: INTERNAL MEDICINE | Facility: CLINIC | Age: 73
End: 2018-11-30

## 2018-11-30 VITALS
SYSTOLIC BLOOD PRESSURE: 142 MMHG | TEMPERATURE: 97.6 F | HEART RATE: 56 BPM | OXYGEN SATURATION: 98 % | DIASTOLIC BLOOD PRESSURE: 80 MMHG

## 2018-11-30 DIAGNOSIS — R51.9 BILATERAL HEADACHES: ICD-10-CM

## 2018-11-30 DIAGNOSIS — R42 DIZZINESS: Primary | ICD-10-CM

## 2018-11-30 DIAGNOSIS — L08.9 SKIN INFECTION: ICD-10-CM

## 2018-11-30 PROBLEM — S60.450A FOREIGN BODY OF RIGHT INDEX FINGER: Status: RESOLVED | Noted: 2018-08-16 | Resolved: 2018-11-30

## 2018-11-30 PROBLEM — R10.9 STOMACH PAIN: Status: RESOLVED | Noted: 2018-03-14 | Resolved: 2018-11-30

## 2018-11-30 PROCEDURE — 90662 IIV NO PRSV INCREASED AG IM: CPT | Performed by: INTERNAL MEDICINE

## 2018-11-30 PROCEDURE — G0008 ADMIN INFLUENZA VIRUS VAC: HCPCS | Performed by: INTERNAL MEDICINE

## 2018-11-30 PROCEDURE — 99214 OFFICE O/P EST MOD 30 MIN: CPT | Performed by: INTERNAL MEDICINE

## 2018-11-30 RX ORDER — OMEPRAZOLE 20 MG/1
20 CAPSULE, DELAYED RELEASE ORAL DAILY
Qty: 90 CAPSULE | Refills: 3 | Status: SHIPPED | OUTPATIENT
Start: 2018-11-30 | End: 2020-01-24

## 2018-11-30 RX ORDER — DOXYCYCLINE HYCLATE 100 MG/1
100 CAPSULE ORAL 2 TIMES DAILY WITH MEALS
Qty: 20 CAPSULE | Refills: 0 | Status: SHIPPED | OUTPATIENT
Start: 2018-11-30 | End: 2019-01-24

## 2018-11-30 RX ORDER — DULOXETINE 40 MG/1
40 CAPSULE, DELAYED RELEASE ORAL DAILY
Qty: 30 CAPSULE | Refills: 5 | Status: SHIPPED | OUTPATIENT
Start: 2018-11-30 | End: 2019-03-07 | Stop reason: SDUPTHER

## 2018-11-30 RX ORDER — AMLODIPINE BESYLATE 10 MG/1
10 TABLET ORAL DAILY
Qty: 90 TABLET | Refills: 3 | Status: SHIPPED | OUTPATIENT
Start: 2018-11-30 | End: 2019-03-07 | Stop reason: SDUPTHER

## 2018-11-30 RX ORDER — LISINOPRIL 20 MG/1
20 TABLET ORAL DAILY
Qty: 90 TABLET | Refills: 3 | Status: SHIPPED | OUTPATIENT
Start: 2018-11-30 | End: 2020-02-05 | Stop reason: SDUPTHER

## 2018-11-30 NOTE — PROGRESS NOTES
Chief Complaint   Patient presents with   • Abstract     Pt states he's been having headaches, dizziness, memory problems, and balance issues for sometime now but seems to be getting worse. Also states he has rash under both arms, tends to itch.     Subjective   Tristan Hillman is a 73 y.o. male.     Pt here with multiple complaints today.   He has been having HAs on and off since summer. He has pressure in his right ear. He wears bilateral hearing aids.    He has had tubes in both ears.  Just had tubes removed a few months ago per ENT.    Complains of dizziness at times with movement of head left and right.  No problems when he rolls over in bed.  Also has dizziness if he stands up too quickly.  Complains of problems of poor balance when he walks.  States he has had this problem for years.  He is worried about his memory.  He does not get lost driving.  He is forgetting names, but he has done that for yrs. he feels his memory is better than most of his friends though.  Has had a rash under arms for several months. The area is itchy. He has tried many different deodorants.            The following portions of the patient's history were reviewed and updated as appropriate: allergies, current medications, past family history, past medical history, past social history, past surgical history and problem list.    Review of Systems   Constitutional: Negative for chills and fever.   HENT: Positive for hearing loss. Negative for congestion, ear pain and sore throat.    Musculoskeletal: Positive for gait problem.   Skin: Positive for rash.   Neurological: Positive for dizziness and headaches. Negative for light-headedness.   Psychiatric/Behavioral: Positive for confusion.   All other systems reviewed and are negative.      Objective   /80   Pulse 56   Temp 97.6 °F (36.4 °C)   SpO2 98%   There is no height or weight on file to calculate BMI.  Physical Exam   Constitutional: He is oriented to person, place, and time. He  appears well-developed and well-nourished.   Pleasant gentleman in no obvious distress, bilateral hearing aids in place   HENT:   Head: Normocephalic and atraumatic.   Mouth/Throat: Oropharynx is clear and moist.   Tympanic membranes with bilateral perforation from recent tube removal, no discharge noted, oropharynx clear.  Bilateral hearing aids.  Patient is very hard of hearing   Eyes: Conjunctivae and EOM are normal. Pupils are equal, round, and reactive to light.   Mild horizontal nystagmus noted with rapid head turning   Neck: Normal range of motion. Neck supple. No thyromegaly present.   Cardiovascular: Normal rate, regular rhythm, normal heart sounds and intact distal pulses.   No murmur heard.  Pulmonary/Chest: Effort normal and breath sounds normal. No respiratory distress. He has no wheezes.   Abdominal: Soft. Bowel sounds are normal. He exhibits no distension. There is no tenderness.   Musculoskeletal: Normal range of motion. He exhibits no edema.   Lymphadenopathy:     He has no cervical adenopathy.   Neurological: He is alert and oriented to person, place, and time. He displays normal reflexes. No cranial nerve deficit. Coordination abnormal.   Poor balance noted with changes in position from chair to exam table   Skin:   Axilla with brown macular lesions that are partially scarred but may be infectious also, no surrounding erythema or discharge, no tenderness   Psychiatric: He has a normal mood and affect. His behavior is normal. Judgment and thought content normal.   Nursing note and vitals reviewed.      Assessment/Plan   Tristan Hillman is here today and the following problems have been addressed:      Tristan was seen today for abstract.    Diagnoses and all orders for this visit:    Dizziness  -     MRI Brain Without Contrast; Future    Bilateral headaches    Skin infection  -     doxycycline (VIBRAMYCIN) 100 MG capsule; Take 1 capsule by mouth 2 (Two) Times a Day With Meals.    Other orders  -      amLODIPine (NORVASC) 10 MG tablet; Take 1 tablet by mouth Daily.  -     DULoxetine HCl 40 MG capsule delayed-release particles; Take 1 capsule by mouth Daily.  -     lisinopril (PRINIVIL,ZESTRIL) 20 MG tablet; Take 1 tablet by mouth Daily.  -     omeprazole (priLOSEC) 20 MG capsule; Take 1 capsule by mouth Daily.    MRI brain ordered due to dizziness, balance and memory issues  Suspect ear issues may be cause of his dizziness and balance problems  He will see his ENT doctor for possible BPPV  Given doxy for 10 days for skin lesions in axilla    RTC in 3 weeks as scheduled    Please note that portions of this note were completed with a voice recognition program.  Efforts were made to edit dictation, but occasionally words are mistranscribed.

## 2018-12-13 ENCOUNTER — HOSPITAL ENCOUNTER (OUTPATIENT)
Dept: MRI IMAGING | Facility: HOSPITAL | Age: 73
Discharge: HOME | End: 2018-12-13
Admitting: INTERNAL MEDICINE

## 2018-12-13 DIAGNOSIS — R42 DIZZINESS: ICD-10-CM

## 2018-12-13 PROCEDURE — 70551 MRI BRAIN STEM W/O DYE: CPT

## 2018-12-13 NOTE — PROGRESS NOTES
Please tell patient that MRI of brain does not reveal any significant abnormalities, but he does have an effusion or fluid in his left mastoid area.  I recommend that he discuss this fluid with his ear nose and throat doctor on his follow-up appointment.  I suspect that his headaches may be due to removal of the tubes from his ear several months ago.  I also suspect the dizziness may be due to problems with his ears and mastoids.

## 2019-01-15 RX ORDER — ZOLPIDEM TARTRATE 5 MG/1
TABLET ORAL
Qty: 30 TABLET | Refills: 2 | Status: SHIPPED | OUTPATIENT
Start: 2019-01-15 | End: 2019-01-24

## 2019-01-24 ENCOUNTER — OFFICE VISIT (OUTPATIENT)
Dept: INTERNAL MEDICINE | Facility: CLINIC | Age: 74
End: 2019-01-24

## 2019-01-24 VITALS
DIASTOLIC BLOOD PRESSURE: 78 MMHG | SYSTOLIC BLOOD PRESSURE: 146 MMHG | OXYGEN SATURATION: 98 % | BODY MASS INDEX: 31.64 KG/M2 | TEMPERATURE: 98.2 F | HEART RATE: 62 BPM | HEIGHT: 71 IN | WEIGHT: 226 LBS

## 2019-01-24 DIAGNOSIS — E78.2 MIXED HYPERLIPIDEMIA: ICD-10-CM

## 2019-01-24 DIAGNOSIS — G47.00 INSOMNIA, UNSPECIFIED TYPE: ICD-10-CM

## 2019-01-24 DIAGNOSIS — K21.9 GASTROESOPHAGEAL REFLUX DISEASE WITHOUT ESOPHAGITIS: ICD-10-CM

## 2019-01-24 DIAGNOSIS — R35.1 BENIGN PROSTATIC HYPERPLASIA WITH NOCTURIA: Primary | ICD-10-CM

## 2019-01-24 DIAGNOSIS — F41.1 GENERALIZED ANXIETY DISORDER: ICD-10-CM

## 2019-01-24 DIAGNOSIS — N40.1 BENIGN PROSTATIC HYPERPLASIA WITH NOCTURIA: Primary | ICD-10-CM

## 2019-01-24 DIAGNOSIS — I10 ESSENTIAL HYPERTENSION: ICD-10-CM

## 2019-01-24 PROBLEM — R51.9 BILATERAL HEADACHES: Status: RESOLVED | Noted: 2018-11-30 | Resolved: 2019-01-24

## 2019-01-24 PROBLEM — L08.9 SKIN INFECTION: Status: RESOLVED | Noted: 2018-11-30 | Resolved: 2019-01-24

## 2019-01-24 PROCEDURE — 99214 OFFICE O/P EST MOD 30 MIN: CPT | Performed by: INTERNAL MEDICINE

## 2019-01-24 RX ORDER — DOXAZOSIN 2 MG/1
2 TABLET ORAL NIGHTLY
Qty: 30 TABLET | Refills: 3 | Status: SHIPPED | OUTPATIENT
Start: 2019-01-24 | End: 2019-06-11 | Stop reason: SDUPTHER

## 2019-01-24 RX ORDER — ALPRAZOLAM 0.25 MG/1
0.25 TABLET ORAL DAILY
Qty: 30 TABLET | Refills: 2 | Status: SHIPPED | OUTPATIENT
Start: 2019-01-24 | End: 2019-07-01 | Stop reason: SDUPTHER

## 2019-01-24 NOTE — PROGRESS NOTES
Chief Complaint   Patient presents with   • Follow-up     for GERD, HLD, HTN, and anxiety. Pt states he has a few spots on his lower left leg he would like looked at. Pt states he is no longer using Ambien as he has been taking 2.5 mg of melatonin and this helps him sleep     Subjective   Tristan Hillman is a 73 y.o. male.     Here today for follow up of HTN, HLD, anxiety, GERD, PN.   HTN/HLD-  His BP is elevated today.  His home reads run 120-150/.  He denies CP, SOA, palpitations or edema  Anxiety- he continues to take xanax every AM.  Says he feels nervous all day without this medication.    GERD- he denies any GERD sxs, takes omeprazole daily  PN- has some burning and numbness in feet.    He is awakening 4-5 times a night.  No dribbling or hesitancy.  He often has to urinate 5-10 minutes later.  PSA was normal in August.  He saw a urologist in [place] due to kidney stone, not for prostate.   He stopped taking ambien, now on melatonin.  States that he sleeps well until he has to awaken early in the morning to urinate, then does not go back to sleep.  HCM- flu shot, Pneumovax and colonoscopy all up-to-date.  He complains of an area of itching and darker skin on distal left lower leg.  This initially was an area of scabbing and skin lesion that required antibiotics.  It is now darker and very pruritic or itchy per patient         The following portions of the patient's history were reviewed and updated as appropriate: allergies, current medications, past family history, past medical history, past social history, past surgical history and problem list.    Review of Systems   Constitutional: Negative for activity change, appetite change and unexpected weight change.   HENT: Positive for hearing loss.    Respiratory: Negative for shortness of breath.    Cardiovascular: Negative for chest pain, palpitations and leg swelling.   Gastrointestinal: Negative for abdominal pain.   Genitourinary:        Urinates 3-4 times  "at night   Skin: Positive for rash.   Neurological: Positive for numbness. Negative for headaches.        Burning and numbness in feet   Psychiatric/Behavioral: Positive for sleep disturbance. Negative for dysphoric mood. The patient is nervous/anxious.    All other systems reviewed and are negative.      Objective   /78   Pulse 62   Temp 98.2 °F (36.8 °C)   Ht 180.3 cm (70.98\")   Wt 103 kg (226 lb)   SpO2 98%   BMI 31.54 kg/m²   Body mass index is 31.54 kg/m².  Physical Exam   Constitutional: He is oriented to person, place, and time. He appears well-developed and well-nourished. No distress.   Very pleasant gentleman in no distress today   HENT:   Head: Normocephalic and atraumatic.   Right Ear: External ear normal.   Left Ear: External ear normal.   Mouth/Throat: Oropharynx is clear and moist.   Bilateral hearing aids in place   Eyes: Conjunctivae and EOM are normal. Pupils are equal, round, and reactive to light.   Neck: Normal range of motion. Neck supple. No thyromegaly present.   Cardiovascular: Normal rate, regular rhythm, normal heart sounds and intact distal pulses.   No murmur heard.  No carotid bruits   Pulmonary/Chest: Effort normal and breath sounds normal. No respiratory distress. He has no wheezes.   Abdominal: Soft. Bowel sounds are normal. He exhibits no distension. There is no tenderness.   Musculoskeletal: Normal range of motion. He exhibits no edema.   Lymphadenopathy:     He has no cervical adenopathy.   Neurological: He is alert and oriented to person, place, and time. No cranial nerve deficit. Coordination normal.   Skin:   Left lateral lower leg with approximate 2 cm area of hyperpigmentation and roughness to skin, surrounding mild excoriation is noted   Psychiatric: He has a normal mood and affect. His behavior is normal. Judgment and thought content normal.   Nursing note and vitals reviewed.      Assessment/Plan   Tristan Hillman is here today and the following problems have been " addressed:      Tristan was seen today for follow-up.    Diagnoses and all orders for this visit:    Benign prostatic hyperplasia with nocturia    Essential hypertension    Mixed hyperlipidemia    Gastroesophageal reflux disease without esophagitis    Generalized anxiety disorder    Insomnia, unspecified type    Other orders  -     ALPRAZolam (XANAX) 0.25 MG tablet; Take 1 tablet by mouth Daily.  -     doxazosin (CARDURA) 2 MG tablet; Take 1 tablet by mouth Every Night.        Follow heart healthy/low salt diet  Avoid processed foods  Monitor blood pressure as discussed  Exercise as tolerated up to 30 minutes 5 days per week  Take all medications as prescribed  cardura 2 mg qhs for BPH sxs  Recommend he take duloxetine 40 mg daily for anxiety and arthritis  Recommend HC 1% BID to rash on left leg    Return in about 6 weeks (around 3/7/2019) for Next scheduled follow up.      Marilyn K. Vermeesch, MD      Please note that portions of this note were completed with a voice recognition program.  Efforts were made to edit dictation, but occasionally words are mistranscribed.

## 2019-02-15 ENCOUNTER — OFFICE VISIT (OUTPATIENT)
Dept: INTERNAL MEDICINE | Facility: CLINIC | Age: 74
End: 2019-02-15

## 2019-02-15 VITALS
BODY MASS INDEX: 32.34 KG/M2 | WEIGHT: 231 LBS | RESPIRATION RATE: 16 BRPM | SYSTOLIC BLOOD PRESSURE: 136 MMHG | DIASTOLIC BLOOD PRESSURE: 85 MMHG | HEART RATE: 57 BPM | HEIGHT: 71 IN | OXYGEN SATURATION: 96 %

## 2019-02-15 DIAGNOSIS — M54.6 ACUTE MIDLINE THORACIC BACK PAIN: Primary | ICD-10-CM

## 2019-02-15 PROCEDURE — 99214 OFFICE O/P EST MOD 30 MIN: CPT | Performed by: INTERNAL MEDICINE

## 2019-02-15 NOTE — PROGRESS NOTES
"Chief Complaint   Patient presents with   • Abstract      middle back barbara x3 weeks. comes and goes. pain radiates to stomach. Aleve helps for short period of time     Subjective   Tristan Hillman is a 73 y.o. male.     Here today with complaints of mid back pain for 3 weeks.   The pain started one day after he laid on his car creeper and was working on his car.    The pain started on left side of spine, but now is bilateral.  It radiates around left side to front of stomach.    He has been to the chiropractor and it was not helpful.    He also saw the cardiologist and the work up was all normal. He did increase his isordil to 60 mg twice a day.  His BP is improved.   XR of thoracic/llumbar spine in past has shown severe DDD/DJD of spine with bridging osteophytes and scoliosis.   He is taking aleve and it is helpful.  He is using ice and feels it is helpful.   No bowel or bladder incontinence.  No numbness, tingling or burning of lower extremities.         The following portions of the patient's history were reviewed and updated as appropriate: allergies, current medications, past family history, past medical history, past social history, past surgical history and problem list.    Review of Systems   Gastrointestinal: Negative.    Genitourinary: Negative.    Musculoskeletal: Positive for back pain. Negative for gait problem and neck pain.   Neurological: Negative for weakness and numbness.       Objective   /85 (BP Location: Left arm, Patient Position: Sitting, Cuff Size: Adult)   Pulse 57   Resp 16   Ht 180.3 cm (71\")   Wt 105 kg (231 lb)   SpO2 96%   BMI 32.22 kg/m²   Body mass index is 32.22 kg/m².  Physical Exam   Constitutional: He is oriented to person, place, and time. He appears well-developed and well-nourished. No distress.   HENT:   Head: Normocephalic and atraumatic.   Eyes: EOM are normal. Pupils are equal, round, and reactive to light.   Pulmonary/Chest: Effort normal.   Musculoskeletal: "   Patient has pain to palpation over T8, 9 and 10 area, no significant pain in lumbar spine.  There is pain over thoracic paraspinous muscles to the left of spine.  Lower extremity muscle strength is 5/5, normal gait noted   Neurological: He is alert and oriented to person, place, and time.   Psychiatric: He has a normal mood and affect. His behavior is normal. Judgment and thought content normal.   Nursing note and vitals reviewed.      Assessment/Plan   Tristan Hillman is here today and the following problems have been addressed:      Tristan was seen today for abstract.    Diagnoses and all orders for this visit:    Acute midline thoracic back pain  -     MRI Thoracic Spine Without Contrast; Future    MRI of thoracic spine ordered  May continue aleve with food  May continue ice  Will call with results and further plan of care    RTC prn     Please note that portions of this note were completed with a voice recognition program.  Efforts were made to edit dictation, but occasionally words are mistranscribed.

## 2019-02-22 ENCOUNTER — HOSPITAL ENCOUNTER (OUTPATIENT)
Dept: MRI IMAGING | Facility: HOSPITAL | Age: 74
Discharge: HOME | End: 2019-02-22
Admitting: INTERNAL MEDICINE

## 2019-02-22 DIAGNOSIS — M54.6 ACUTE MIDLINE THORACIC BACK PAIN: ICD-10-CM

## 2019-02-22 PROCEDURE — 72146 MRI CHEST SPINE W/O DYE: CPT

## 2019-02-22 NOTE — PROGRESS NOTES
Please tell patient he has significant arthritis and degenerative disc disease.  There is no significant stenosis or narrowing impinging his nerves though.  If he is interested in physical therapy for his back please tell him I will make a referral for him.

## 2019-02-25 DIAGNOSIS — M51.34 THORACIC DEGENERATIVE DISC DISEASE: Primary | ICD-10-CM

## 2019-02-25 DIAGNOSIS — M47.814 SPONDYLOSIS OF THORACIC REGION WITHOUT MYELOPATHY OR RADICULOPATHY: ICD-10-CM

## 2019-03-07 ENCOUNTER — OFFICE VISIT (OUTPATIENT)
Dept: INTERNAL MEDICINE | Facility: CLINIC | Age: 74
End: 2019-03-07

## 2019-03-07 VITALS
DIASTOLIC BLOOD PRESSURE: 82 MMHG | HEIGHT: 71 IN | TEMPERATURE: 97.4 F | OXYGEN SATURATION: 97 % | BODY MASS INDEX: 32.9 KG/M2 | WEIGHT: 235 LBS | SYSTOLIC BLOOD PRESSURE: 138 MMHG | HEART RATE: 70 BPM

## 2019-03-07 DIAGNOSIS — M54.50 CHRONIC LEFT-SIDED LOW BACK PAIN WITHOUT SCIATICA: ICD-10-CM

## 2019-03-07 DIAGNOSIS — G89.29 CHRONIC LEFT-SIDED LOW BACK PAIN WITHOUT SCIATICA: ICD-10-CM

## 2019-03-07 DIAGNOSIS — M54.6 ACUTE MIDLINE THORACIC BACK PAIN: Primary | ICD-10-CM

## 2019-03-07 PROCEDURE — 99213 OFFICE O/P EST LOW 20 MIN: CPT | Performed by: INTERNAL MEDICINE

## 2019-03-07 RX ORDER — AMLODIPINE BESYLATE 10 MG/1
10 TABLET ORAL DAILY
Qty: 90 TABLET | Refills: 3 | Status: SHIPPED | OUTPATIENT
Start: 2019-03-07 | End: 2020-05-06 | Stop reason: SDUPTHER

## 2019-03-07 RX ORDER — ISOSORBIDE MONONITRATE 60 MG/1
60 TABLET, EXTENDED RELEASE ORAL DAILY
Qty: 90 TABLET | Refills: 3 | Status: SHIPPED | OUTPATIENT
Start: 2019-03-07 | End: 2020-05-11 | Stop reason: SDUPTHER

## 2019-03-07 RX ORDER — DULOXETIN HYDROCHLORIDE 60 MG/1
60 CAPSULE, DELAYED RELEASE ORAL DAILY
Qty: 30 CAPSULE | Refills: 6 | Status: SHIPPED | OUTPATIENT
Start: 2019-03-07 | End: 2021-02-08

## 2019-03-07 RX ORDER — DULOXETINE 40 MG/1
40 CAPSULE, DELAYED RELEASE ORAL DAILY
Qty: 30 CAPSULE | Refills: 5 | Status: SHIPPED | OUTPATIENT
Start: 2019-03-07 | End: 2019-03-07

## 2019-03-07 NOTE — PROGRESS NOTES
"Chief Complaint   Patient presents with   • Follow-up     6 weeks for back pain. Pt states this has gotten worse since last visit.      Subjective   Tristan Hillman is a 73 y.o. male.     Here today for follow up of thoracic back pain.   MRI revealed DDD/DJD changes with no myelopathy/stenosis.    Patient states that his thoracic back pain has worsened slightly since our last visit a few weeks ago.  He has not been doing any excessive work on his cars or at home as instructed.  He wonders if seeing a chiropractor would be helpful.  He remains on duloxetine for underlying depression and arthritis pain.  He is agreeable to seeing physical therapy for his long-standing chronic low back pain and for current acute thoracic pain.         The following portions of the patient's history were reviewed and updated as appropriate: allergies, current medications, past family history, past medical history, past social history, past surgical history and problem list.    Review of Systems   Constitutional: Positive for activity change.   Musculoskeletal: Positive for arthralgias, back pain and myalgias.   Psychiatric/Behavioral: Positive for sleep disturbance.       Objective   /82   Pulse 70   Temp 97.4 °F (36.3 °C)   Ht 180.3 cm (71\")   Wt 107 kg (235 lb)   SpO2 97%   BMI 32.78 kg/m²   Body mass index is 32.78 kg/m².  Physical Exam   Constitutional: He is oriented to person, place, and time. He appears well-developed and well-nourished. No distress.   HENT:   Head: Normocephalic and atraumatic.   Eyes: EOM are normal. Pupils are equal, round, and reactive to light.   Pulmonary/Chest: Effort normal.   Neurological: He is alert and oriented to person, place, and time. No cranial nerve deficit.   Psychiatric: He has a normal mood and affect. His behavior is normal. Judgment and thought content normal.   Nursing note and vitals reviewed.      Assessment/Plan   Tristan Hillman is here today and the following problems have been " addressed:      Tristan was seen today for follow-up.    Diagnoses and all orders for this visit:    Acute midline thoracic back pain    Chronic left-sided low back pain without sciatica    Other orders  -     Discontinue: DULoxetine HCl 40 MG capsule delayed-release particles; Take 1 capsule by mouth Daily.  -     amLODIPine (NORVASC) 10 MG tablet; Take 1 tablet by mouth Daily.  -     isosorbide mononitrate (IMDUR) 60 MG 24 hr tablet; Take 1 tablet by mouth Daily.  -     DULoxetine (CYMBALTA) 60 MG capsule; Take 1 capsule by mouth Daily.    Increase duloxetine to 60 mg daily to see if this will be helpful for his back pain  Patient has referral to physical therapy, however will try to make this closer to his home in [place]  Recommend that he work on weight loss as this will help decrease his overall back pain  Also recommend that he work on strengthening his core during physical therapy as this will also help reduce back pain  He may take extra strength Tylenol 2 tablets a.m. and 2 tablets p.m. as this will also help in combination with higher dose duloxetine    Return to clinic in 8 weeks for follow-up    Please note that portions of this note were completed with a voice recognition program.  Efforts were made to edit dictation, but occasionally words are mistranscribed.

## 2019-06-11 RX ORDER — DOXAZOSIN 2 MG/1
2 TABLET ORAL
Qty: 90 TABLET | Refills: 0 | Status: SHIPPED | OUTPATIENT
Start: 2019-06-11 | End: 2019-09-23 | Stop reason: SDUPTHER

## 2019-07-01 RX ORDER — ALPRAZOLAM 0.25 MG/1
TABLET ORAL
Qty: 30 TABLET | Refills: 2 | Status: SHIPPED | OUTPATIENT
Start: 2019-07-01 | End: 2019-11-04 | Stop reason: SDUPTHER

## 2019-09-23 RX ORDER — DOXAZOSIN 2 MG/1
2 TABLET ORAL NIGHTLY
Qty: 90 TABLET | Refills: 0 | Status: SHIPPED | OUTPATIENT
Start: 2019-09-23 | End: 2020-01-06

## 2019-10-31 ENCOUNTER — HOSPITAL ENCOUNTER (OUTPATIENT)
Dept: GENERAL RADIOLOGY | Facility: HOSPITAL | Age: 74
Discharge: HOME | End: 2019-10-31
Admitting: INTERNAL MEDICINE

## 2019-10-31 ENCOUNTER — OFFICE VISIT (OUTPATIENT)
Dept: INTERNAL MEDICINE | Facility: CLINIC | Age: 74
End: 2019-10-31

## 2019-10-31 VITALS
HEART RATE: 59 BPM | SYSTOLIC BLOOD PRESSURE: 144 MMHG | OXYGEN SATURATION: 98 % | DIASTOLIC BLOOD PRESSURE: 72 MMHG | TEMPERATURE: 97.8 F

## 2019-10-31 DIAGNOSIS — M79.671 RIGHT FOOT PAIN: ICD-10-CM

## 2019-10-31 DIAGNOSIS — M72.2 PLANTAR FASCIITIS, LEFT: Primary | ICD-10-CM

## 2019-10-31 DIAGNOSIS — I10 ESSENTIAL HYPERTENSION: ICD-10-CM

## 2019-10-31 PROBLEM — R42 DIZZINESS: Status: RESOLVED | Noted: 2018-11-30 | Resolved: 2019-10-31

## 2019-10-31 PROCEDURE — 99214 OFFICE O/P EST MOD 30 MIN: CPT | Performed by: INTERNAL MEDICINE

## 2019-10-31 PROCEDURE — 73630 X-RAY EXAM OF FOOT: CPT

## 2019-10-31 RX ORDER — CARVEDILOL 3.12 MG/1
3.12 TABLET ORAL 2 TIMES DAILY WITH MEALS
Qty: 60 TABLET | Refills: 1 | Status: SHIPPED | OUTPATIENT
Start: 2019-10-31 | End: 2020-01-06

## 2019-10-31 NOTE — PROGRESS NOTES
Chief Complaint   Patient presents with   • Abstract     Pt states his left heel has been hurting X 1 month. States now his right foot has been hurting as well. Also states his BP has been running high.      Subjective   Tristan Hillman is a 74 y.o. male.     Here today with complaints of left heel and right foot pain for a month.  He has been drinking cherry juice for possible gout, and it has not been helpful.  The pain started in bottom of left heel.  It hurts worse in AM on standing.  A few weeks ago the right foot started to hurt across the top of foot.  He has tried someone elses gout med for a week and it did not help.  He then tried aleve and it does not help.      He is also concerned with elevated BP.   Home BP is running high, up to 174/91.  He denies any CP, palpitations, edema.  He gets SOA at times.  No headaches or changes in vision.  He is compliant with all of his medications.         The following portions of the patient's history were reviewed and updated as appropriate: allergies, current medications, past family history, past medical history, past social history, past surgical history and problem list.    Review of Systems   Constitutional: Positive for activity change.   Cardiovascular: Negative for chest pain, palpitations and leg swelling.   Musculoskeletal: Positive for arthralgias and gait problem.        Bilateral foot pain as noted in HPI   Neurological: Negative for dizziness and headaches.       Objective   /72   Pulse 59   Temp 97.8 °F (36.6 °C)   SpO2 98%   There is no height or weight on file to calculate BMI.  Physical Exam   Constitutional: He is oriented to person, place, and time. He appears well-developed and well-nourished. No distress.   Very pleasant gentleman, he ambulates with a limp due to bilateral foot pain   HENT:   Head: Normocephalic and atraumatic.   Pulmonary/Chest: Effort normal.   Musculoskeletal:   Left foot with pain to palpation at bottom/plantar surface  of left heel, he has pain with dorsiflexion into base of left heel, no pain of Achilles tendon or gastrocnemius muscle.  Right foot, no pain to palpation of forefoot, no pain with range of motion, no erythema or warmth of foot noted.  Patient states he has pain in right foot only during ambulation with pressure on forefoot   Neurological: He is alert and oriented to person, place, and time.   Psychiatric: He has a normal mood and affect. His behavior is normal. Judgment and thought content normal.   Nursing note and vitals reviewed.      Assessment/Plan   Tristan Hillman is here today and the following problems have been addressed:      Tristan was seen today for abstract.    Diagnoses and all orders for this visit:    Plantar fasciitis, left    Essential hypertension    Right foot pain  -     XR Foot 3+ View Right; Future    Other orders  -     carvedilol (COREG) 3.125 MG tablet; Take 1 tablet by mouth 2 (Two) Times a Day With Meals.    XR right foot, rule out stress fracture  Start coreg 3.125 mg BID for BP  Follow heart healthy/low salt diet  Avoid processed foods  Monitor blood pressure as discussed  Exercise as tolerated up to 30 minutes 5 days per week  Take all medications as prescribed  Given exercises for plantar fasciitis of left heel, he was encouraged to do these exercises 10 times daily, 10 times each  Patient states he is too busy to do exercises this often, I explained to him his left foot will not get better without doing exercises often  I also encouraged him to get a heel cup for his left heel    RTC 4 weeks for routine followup, follow-up of hypertension and follow-up of foot pain    Please note that portions of this note were completed with a voice recognition program.  Efforts were made to edit dictation, but occasionally words are mistranscribed.

## 2019-11-04 RX ORDER — MELOXICAM 7.5 MG/1
7.5 TABLET ORAL EVERY MORNING
Qty: 30 TABLET | Refills: 1 | Status: SHIPPED | OUTPATIENT
Start: 2019-11-04 | End: 2020-02-05 | Stop reason: SDUPTHER

## 2019-11-04 RX ORDER — ALPRAZOLAM 0.25 MG/1
TABLET ORAL
Qty: 30 TABLET | Refills: 2 | Status: SHIPPED | OUTPATIENT
Start: 2019-11-04 | End: 2020-02-13 | Stop reason: SDUPTHER

## 2019-11-04 NOTE — PROGRESS NOTES
Please tell patient x-ray of right foot does not show a stress fracture but shows significant arthritis of his great toe at the base.  He also has some osteopenia or weakening of his bones that increase his risk for fractures.  Please tell him I would like him to take vitamin D 2000 units daily.  In addition I would like you to call in Mobic 7.5 mg p.o. every morning with food number 30 tablets with 1 refill.  Please tell him to stop any over-the-counter ibuprofen or Aleve that he may be taking.  This medication is in place of all anti-inflammatories over-the-counter.  Please tell him it is important that he do stretches for his other foot Achilles tendon as directed.

## 2020-01-06 ENCOUNTER — OFFICE VISIT (OUTPATIENT)
Dept: INTERNAL MEDICINE | Facility: CLINIC | Age: 75
End: 2020-01-06

## 2020-01-06 VITALS
HEART RATE: 51 BPM | SYSTOLIC BLOOD PRESSURE: 150 MMHG | WEIGHT: 231 LBS | TEMPERATURE: 97.7 F | HEIGHT: 71 IN | DIASTOLIC BLOOD PRESSURE: 96 MMHG | BODY MASS INDEX: 32.34 KG/M2 | OXYGEN SATURATION: 98 %

## 2020-01-06 DIAGNOSIS — I10 ESSENTIAL HYPERTENSION: Primary | ICD-10-CM

## 2020-01-06 DIAGNOSIS — M72.2 PLANTAR FASCIITIS, LEFT: ICD-10-CM

## 2020-01-06 DIAGNOSIS — E78.2 MIXED HYPERLIPIDEMIA: ICD-10-CM

## 2020-01-06 PROCEDURE — 99213 OFFICE O/P EST LOW 20 MIN: CPT | Performed by: INTERNAL MEDICINE

## 2020-01-06 RX ORDER — CARVEDILOL 6.25 MG/1
6.25 TABLET ORAL 2 TIMES DAILY WITH MEALS
Qty: 60 TABLET | Refills: 3 | Status: SHIPPED | OUTPATIENT
Start: 2020-01-06 | End: 2020-05-11

## 2020-01-06 RX ORDER — DOXAZOSIN 2 MG/1
TABLET ORAL
Qty: 90 TABLET | Refills: 1 | Status: SHIPPED | OUTPATIENT
Start: 2020-01-06 | End: 2020-07-06

## 2020-01-06 NOTE — PROGRESS NOTES
"Chief Complaint   Patient presents with   • Follow-up     for hypertension and foot pain     Subjective   Tristan Hillman is a 74 y.o. male.     Patient is here today for follow-up of hypertension.  He was seen on October 31 and started on Coreg 3.125 mg twice daily.  He was instructed to follow-up 4 weeks later and did not.  His blood pressure remains elevated today. He did not take the Cardura today.  He ran out of Coreg a few days ago.  He says BP has been up and down.  125-150/60-80. HR 48-80.  He feels better with slower HR.  No CP, SOA, palpitations, edema.      He was also seen over 2 months ago for bilateral foot plantar fasciitis and was encouraged to do his exercises multiple times daily.  He has been doing the stretches in feet twice a day.  He has been taking the mobic as needed for DJD and foot pain.  Foot pain is much improved.       The following portions of the patient's history were reviewed and updated as appropriate: allergies, current medications, past family history, past medical history, past social history, past surgical history and problem list.    Review of Systems   Constitutional: Negative for activity change, appetite change and unexpected weight change.   HENT: Positive for hearing loss.    Eyes: Negative for visual disturbance.   Respiratory: Negative for shortness of breath.    Cardiovascular: Negative for chest pain, palpitations and leg swelling.   Gastrointestinal: Negative for abdominal pain.   Neurological: Negative for headaches.       Objective   /96   Pulse 51   Temp 97.7 °F (36.5 °C)   Ht 180.3 cm (71\")   Wt 105 kg (231 lb)   SpO2 98%   BMI 32.22 kg/m²   Body mass index is 32.22 kg/m².  Physical Exam   Constitutional: He is oriented to person, place, and time. He appears well-developed and well-nourished. No distress.   Pleasant gentleman who appears in no obvious distress today, he is slightly agitated today   HENT:   Head: Normocephalic and atraumatic. "   Mouth/Throat: Oropharynx is clear and moist.   Bilateral hearing aids in place   Eyes: Pupils are equal, round, and reactive to light. Conjunctivae and EOM are normal.   Neck: Normal range of motion. Neck supple. No thyromegaly present.   Cardiovascular: Normal rate, regular rhythm, normal heart sounds and intact distal pulses.   No murmur heard.  No carotid bruits   Pulmonary/Chest: Effort normal and breath sounds normal. No respiratory distress. He has no wheezes.   Musculoskeletal: Normal range of motion. He exhibits no edema.   Lymphadenopathy:     He has no cervical adenopathy.   Neurological: He is alert and oriented to person, place, and time. No cranial nerve deficit. Coordination normal.   Psychiatric: His behavior is normal. Judgment and thought content normal.   He appears slightly anxious and irritable today   Nursing note and vitals reviewed.      Assessment/Plan   Tristan Hillman is here today and the following problems have been addressed:      Tristan was seen today for follow-up.    Diagnoses and all orders for this visit:    Essential hypertension    Plantar fasciitis, left    Other orders  -     carvedilol (COREG) 6.25 MG tablet; Take 1 tablet by mouth 2 (Two) Times a Day With Meals.        Follow heart healthy/low salt diet  Avoid processed foods  Monitor blood pressure as discussed and bring readings to next visit  Exercise as tolerated   Take all medications as prescribed  Increase coreg to 6.25 mg BID  Limit mobic for foot pain due to BP issues  Labs due next visit with Medicare wellness    Return in about 4 weeks (around 2/3/2020).  For Medicare wellness visit with labs      Marilyn K. Vermeesch, MD      Please note that portions of this note were completed with a voice recognition program.  Efforts were made to edit dictation, but occasionally words are mistranscribed.

## 2020-01-24 RX ORDER — OMEPRAZOLE 20 MG/1
CAPSULE, DELAYED RELEASE ORAL
Qty: 90 CAPSULE | Refills: 3 | Status: SHIPPED | OUTPATIENT
Start: 2020-01-24 | End: 2021-02-04

## 2020-01-30 ENCOUNTER — OFFICE VISIT (OUTPATIENT)
Dept: INTERNAL MEDICINE | Facility: CLINIC | Age: 75
End: 2020-01-30

## 2020-01-30 VITALS
TEMPERATURE: 99.6 F | DIASTOLIC BLOOD PRESSURE: 92 MMHG | HEART RATE: 71 BPM | SYSTOLIC BLOOD PRESSURE: 144 MMHG | OXYGEN SATURATION: 96 %

## 2020-01-30 DIAGNOSIS — J01.40 ACUTE NON-RECURRENT PANSINUSITIS: Primary | ICD-10-CM

## 2020-01-30 LAB
EXPIRATION DATE: NORMAL
FLUAV AG NPH QL: NEGATIVE
FLUBV AG NPH QL: NEGATIVE
INTERNAL CONTROL: NORMAL
Lab: NORMAL

## 2020-01-30 PROCEDURE — 99213 OFFICE O/P EST LOW 20 MIN: CPT | Performed by: INTERNAL MEDICINE

## 2020-01-30 PROCEDURE — 87804 INFLUENZA ASSAY W/OPTIC: CPT | Performed by: INTERNAL MEDICINE

## 2020-01-30 RX ORDER — AMOXICILLIN AND CLAVULANATE POTASSIUM 875; 125 MG/1; MG/1
1 TABLET, FILM COATED ORAL 2 TIMES DAILY
Qty: 20 TABLET | Refills: 0 | Status: SHIPPED | OUTPATIENT
Start: 2020-01-30 | End: 2020-02-27

## 2020-01-30 NOTE — PROGRESS NOTES
Chief Complaint   Patient presents with   • Abstract     Pt states he has head congestion, ears feel full, sinus pressure, cough, chest congestion, SOA, and wheezing X 3 days.      Subjective   Tristan Hillman is a 74 y.o. male.     Flu test is negative today.  He did have a flu shot this season.   He has hearing aids and recurrent ear infections.  Has a right ear perforation.  ENT recently gave him two different ear drops.  He just finished ciprodex.     URI    This is a new problem. The current episode started in the past 7 days. The problem has been waxing and waning. The maximum temperature recorded prior to his arrival was 100.4 - 100.9 F. Associated symptoms include congestion, coughing, ear pain, headaches, a plugged ear sensation, rhinorrhea, sinus pain, a sore throat and wheezing. Pertinent negatives include no nausea or vomiting. Associated symptoms comments: Head and chest congestion, SOA  Chest wall pain with cough. He has tried increased fluids (cough drops) for the symptoms.        The following portions of the patient's history were reviewed and updated as appropriate: allergies, current medications, past family history, past medical history, past social history, past surgical history and problem list.    Review of Systems   Constitutional: Positive for activity change, appetite change, fatigue and fever. Negative for chills.   HENT: Positive for congestion, ear pain, postnasal drip, rhinorrhea, sinus pain and sore throat. Negative for ear discharge.    Respiratory: Positive for cough, shortness of breath and wheezing.    Gastrointestinal: Negative for nausea and vomiting.   Neurological: Positive for headaches.       Objective   /92   Pulse 71   Temp 99.6 °F (37.6 °C)   SpO2 96%   There is no height or weight on file to calculate BMI.  Physical Exam   Constitutional: He is oriented to person, place, and time. He appears well-developed and well-nourished. No distress.   Pleasant man, appears  stated age, wearing mask, eyes watery   HENT:   Head: Normocephalic and atraumatic.   Right Ear: External ear normal.   Left Ear: External ear normal.   Mouth/Throat: No oropharyngeal exudate.   Hearing aids removed,right TM perforation noted with no discharge or redness, left TM with mild erythema, OP with clear PND, turbinates inflamed with yellow blood tinged RN, frontal and max sinus tenderness worse on right side   Eyes: Pupils are equal, round, and reactive to light. EOM are normal.   Watery eyes   Neck: Normal range of motion. Neck supple. No thyromegaly present.   Cardiovascular: Normal rate and regular rhythm.   No murmur heard.  Occasional ectopic beat noted   Pulmonary/Chest: Effort normal and breath sounds normal. No respiratory distress. He has no wheezes.   Lymphadenopathy:     He has no cervical adenopathy.   Neurological: He is alert and oriented to person, place, and time. No cranial nerve deficit. Coordination normal.   Psychiatric: He has a normal mood and affect. His behavior is normal. Judgment and thought content normal.   Nursing note and vitals reviewed.      Assessment/Plan   Tristan Hillman is here today and the following problems have been addressed:      Tristan was seen today for abstract.    Diagnoses and all orders for this visit:    Acute non-recurrent pansinusitis    Other orders  -     amoxicillin-clavulanate (AUGMENTIN) 875-125 MG per tablet; Take 1 tablet by mouth 2 (Two) Times a Day.    given amoxil 875 mg BID for 10 days  Increase fluids and rest  May take coricidin HBP for cold and flu sxs  Recommend yogurt daily to prevent diarrhea side effects from ABX    RTC prn if sxs worsen or persist    Please note that portions of this note were completed with a voice recognition program.  Efforts were made to edit dictation, but occasionally words are mistranscribed.

## 2020-02-05 ENCOUNTER — OFFICE VISIT (OUTPATIENT)
Dept: INTERNAL MEDICINE | Facility: CLINIC | Age: 75
End: 2020-02-05

## 2020-02-05 VITALS
SYSTOLIC BLOOD PRESSURE: 140 MMHG | OXYGEN SATURATION: 98 % | DIASTOLIC BLOOD PRESSURE: 80 MMHG | HEIGHT: 71 IN | TEMPERATURE: 98.1 F | HEART RATE: 68 BPM | WEIGHT: 233 LBS | BODY MASS INDEX: 32.62 KG/M2

## 2020-02-05 DIAGNOSIS — E78.2 MIXED HYPERLIPIDEMIA: ICD-10-CM

## 2020-02-05 DIAGNOSIS — I10 ESSENTIAL HYPERTENSION: ICD-10-CM

## 2020-02-05 DIAGNOSIS — Z00.00 ENCOUNTER FOR MEDICARE ANNUAL WELLNESS EXAM: Primary | ICD-10-CM

## 2020-02-05 DIAGNOSIS — F41.1 GENERALIZED ANXIETY DISORDER: ICD-10-CM

## 2020-02-05 DIAGNOSIS — N40.1 BENIGN PROSTATIC HYPERPLASIA WITH NOCTURIA: ICD-10-CM

## 2020-02-05 DIAGNOSIS — Z12.5 SCREENING PSA (PROSTATE SPECIFIC ANTIGEN): ICD-10-CM

## 2020-02-05 DIAGNOSIS — R35.1 BENIGN PROSTATIC HYPERPLASIA WITH NOCTURIA: ICD-10-CM

## 2020-02-05 DIAGNOSIS — K21.9 GASTROESOPHAGEAL REFLUX DISEASE WITHOUT ESOPHAGITIS: ICD-10-CM

## 2020-02-05 PROBLEM — J01.40 ACUTE NON-RECURRENT PANSINUSITIS: Status: RESOLVED | Noted: 2020-01-30 | Resolved: 2020-02-05

## 2020-02-05 PROBLEM — M79.671 RIGHT FOOT PAIN: Status: RESOLVED | Noted: 2019-10-31 | Resolved: 2020-02-05

## 2020-02-05 PROBLEM — M54.6 ACUTE MIDLINE THORACIC BACK PAIN: Status: RESOLVED | Noted: 2019-02-15 | Resolved: 2020-02-05

## 2020-02-05 LAB
ALBUMIN SERPL-MCNC: 4 G/DL (ref 3.5–5.2)
ALBUMIN/GLOB SERPL: 1.7 G/DL
ALP SERPL-CCNC: 61 U/L (ref 39–117)
ALT SERPL-CCNC: 20 U/L (ref 1–41)
AST SERPL-CCNC: 19 U/L (ref 1–40)
BASOPHILS # BLD AUTO: 0.08 10*3/MM3 (ref 0–0.2)
BASOPHILS NFR BLD AUTO: 1.1 % (ref 0–1.5)
BILIRUB SERPL-MCNC: 0.4 MG/DL (ref 0.2–1.2)
BUN SERPL-MCNC: 15 MG/DL (ref 8–23)
BUN/CREAT SERPL: 15 (ref 7–25)
CALCIUM SERPL-MCNC: 9 MG/DL (ref 8.6–10.5)
CHLORIDE SERPL-SCNC: 104 MMOL/L (ref 98–107)
CHOLEST SERPL-MCNC: 163 MG/DL (ref 0–200)
CHOLEST/HDLC SERPL: 5.62 {RATIO}
CO2 SERPL-SCNC: 27 MMOL/L (ref 22–29)
CREAT SERPL-MCNC: 1 MG/DL (ref 0.76–1.27)
EOSINOPHIL # BLD AUTO: 0.29 10*3/MM3 (ref 0–0.4)
EOSINOPHIL NFR BLD AUTO: 4 % (ref 0.3–6.2)
ERYTHROCYTE [DISTWIDTH] IN BLOOD BY AUTOMATED COUNT: 12.2 % (ref 12.3–15.4)
GLOBULIN SER CALC-MCNC: 2.3 GM/DL
GLUCOSE SERPL-MCNC: 93 MG/DL (ref 65–99)
HCT VFR BLD AUTO: 43 % (ref 37.5–51)
HDLC SERPL-MCNC: 29 MG/DL (ref 40–60)
HGB BLD-MCNC: 14.3 G/DL (ref 13–17.7)
IMM GRANULOCYTES # BLD AUTO: 0.02 10*3/MM3 (ref 0–0.05)
IMM GRANULOCYTES NFR BLD AUTO: 0.3 % (ref 0–0.5)
LDLC SERPL CALC-MCNC: 112 MG/DL (ref 0–100)
LYMPHOCYTES # BLD AUTO: 1.94 10*3/MM3 (ref 0.7–3.1)
LYMPHOCYTES NFR BLD AUTO: 26.6 % (ref 19.6–45.3)
MCH RBC QN AUTO: 29.9 PG (ref 26.6–33)
MCHC RBC AUTO-ENTMCNC: 33.3 G/DL (ref 31.5–35.7)
MCV RBC AUTO: 90 FL (ref 79–97)
MONOCYTES # BLD AUTO: 0.65 10*3/MM3 (ref 0.1–0.9)
MONOCYTES NFR BLD AUTO: 8.9 % (ref 5–12)
NEUTROPHILS # BLD AUTO: 4.3 10*3/MM3 (ref 1.7–7)
NEUTROPHILS NFR BLD AUTO: 59.1 % (ref 42.7–76)
NRBC BLD AUTO-RTO: 0 /100 WBC (ref 0–0.2)
PLATELET # BLD AUTO: 168 10*3/MM3 (ref 140–450)
POTASSIUM SERPL-SCNC: 4.4 MMOL/L (ref 3.5–5.2)
PROT SERPL-MCNC: 6.3 G/DL (ref 6–8.5)
PSA SERPL-MCNC: 0.94 NG/ML (ref 0–4)
RBC # BLD AUTO: 4.78 10*6/MM3 (ref 4.14–5.8)
SODIUM SERPL-SCNC: 141 MMOL/L (ref 136–145)
TRIGL SERPL-MCNC: 110 MG/DL (ref 0–150)
TSH SERPL DL<=0.005 MIU/L-ACNC: 1.86 UIU/ML (ref 0.27–4.2)
VLDLC SERPL CALC-MCNC: 22 MG/DL
WBC # BLD AUTO: 7.28 10*3/MM3 (ref 3.4–10.8)

## 2020-02-05 PROCEDURE — G0439 PPPS, SUBSEQ VISIT: HCPCS | Performed by: INTERNAL MEDICINE

## 2020-02-05 PROCEDURE — 96160 PT-FOCUSED HLTH RISK ASSMT: CPT | Performed by: INTERNAL MEDICINE

## 2020-02-05 PROCEDURE — 99397 PER PM REEVAL EST PAT 65+ YR: CPT | Performed by: INTERNAL MEDICINE

## 2020-02-05 RX ORDER — LISINOPRIL 20 MG/1
20 TABLET ORAL DAILY
Qty: 90 TABLET | Refills: 3 | Status: SHIPPED | OUTPATIENT
Start: 2020-02-05 | End: 2021-02-08 | Stop reason: SDUPTHER

## 2020-02-05 RX ORDER — MELOXICAM 7.5 MG/1
7.5 TABLET ORAL EVERY MORNING
Qty: 30 TABLET | Refills: 1 | Status: SHIPPED | OUTPATIENT
Start: 2020-02-05 | End: 2020-02-27

## 2020-02-05 NOTE — PROGRESS NOTES
The ABCs of the Annual Wellness Visit  Subsequent Medicare Wellness Visit    Chief Complaint   Patient presents with   • Medicare Wellness-subsequent       Subjective   History of Present Illness:  Tristan Hillman is a 74 y.o. male who presents for a Subsequent Medicare Wellness Visit.  PMH of CAD with 5 stents, HTN, HLD, GERD, DJD, BPH, anxiety and insomnia.  His home BP is running 125-140/70-80.  HR 50-60.  He denies CP, palpitations or edema.  He has CHAUDHARY.  He denies any current GERD sxs.  He feels his anxiety is doing well.  He does not sleep well.  Has episodes of weakness-this may occur in the middle of the night.  He usually feels the need to urinate at 0300 and feels weak at that time.  He has checked his blood sugar and it is around 100 or slightly less.  He also has checked blood pressure and states that it is running normal.  He has discussed this with his cardiologist and he recommend a NTG for this and it did not help sxs.     HEALTH RISK ASSESSMENT    Recent Hospitalizations:  No hospitalization(s) within the last year.    Current Medical Providers:  Patient Care Team:  Vermeesch, Marilyn K, MD as PCP - General  Vermeesch, Marilyn K, MD as PCP - Family Medicine    Smoking Status:  Social History     Tobacco Use   Smoking Status Former Smoker   • Types: Cigarettes   Smokeless Tobacco Never Used       Alcohol Consumption:  Social History     Substance and Sexual Activity   Alcohol Use Yes       Depression Screen:   PHQ-2/PHQ-9 Depression Screening 2/5/2020   Little interest or pleasure in doing things 0   Feeling down, depressed, or hopeless 0   Trouble falling or staying asleep, or sleeping too much -   Feeling tired or having little energy -   Poor appetite or overeating -   Feeling bad about yourself - or that you are a failure or have let yourself or your family down -   Trouble concentrating on things, such as reading the newspaper or watching television -   Moving or speaking so slowly that other people  could have noticed. Or the opposite - being so fidgety or restless that you have been moving around a lot more than usual -   Thoughts that you would be better off dead, or of hurting yourself in some way -   Total Score 0   If you checked off any problems, how difficult have these problems made it for you to do your work, take care of things at home, or get along with other people? -       Fall Risk Screen:  DINO Fall Risk Assessment was completed, and patient is at LOW risk for falls.Assessment completed on:2/5/2020    Health Habits and Functional and Cognitive Screening:  Functional & Cognitive Status 2/5/2020   Do you have difficulty preparing food and eating? No   Do you have difficulty bathing yourself, getting dressed or grooming yourself? No   Do you have difficulty using the toilet? No   Do you have difficulty moving around from place to place? Yes   Do you have trouble with steps or getting out of a bed or a chair? Yes   Current Diet Unhealthy Diet   Dental Exam Not up to date   Eye Exam Up to date   Exercise (times per week) 0 times per week   Current Exercise Activities Include None   Do you need help using the phone?  No   Are you deaf or do you have serious difficulty hearing?  Yes   Do you need help with transportation? No   Do you need help shopping? No   Do you need help preparing meals?  No   Do you need help with housework?  No   Do you need help with laundry? No   Do you need help taking your medications? No   Do you need help managing money? No   Do you ever drive or ride in a car without wearing a seat belt? No   Have you felt unusual stress, anger or loneliness in the last month? Yes   Who do you live with? Spouse   If you need help, do you have trouble finding someone available to you? No   Do you have difficulty concentrating, remembering or making decisions? Yes         Does the patient have evidence of cognitive impairment? No    Asprin use counseling:Taking ASA appropriately as  indicated    Age-appropriate Screening Schedule:  Refer to the list below for future screening recommendations based on patient's age, sex and/or medical conditions. Orders for these recommended tests are listed in the plan section. The patient has been provided with a written plan.    Health Maintenance   Topic Date Due   • ZOSTER VACCINE (2 of 3) 08/20/2017   • LIPID PANEL  03/14/2019   • COLONOSCOPY  08/12/2029   • INFLUENZA VACCINE  Completed   • TDAP/TD VACCINES  Discontinued          The following portions of the patient's history were reviewed and updated as appropriate: allergies, current medications, past family history, past medical history, past social history, past surgical history and problem list.    Outpatient Medications Prior to Visit   Medication Sig Dispense Refill   • ALPRAZolam (XANAX) 0.25 MG tablet take 1 tablet by mouth once daily 30 tablet 2   • amLODIPine (NORVASC) 10 MG tablet Take 1 tablet by mouth Daily. 90 tablet 3   • amoxicillin-clavulanate (AUGMENTIN) 875-125 MG per tablet Take 1 tablet by mouth 2 (Two) Times a Day. 20 tablet 0   • aspirin 81 MG tablet Take 1 tablet by mouth Daily. 30 tablet 0   • carvedilol (COREG) 6.25 MG tablet Take 1 tablet by mouth 2 (Two) Times a Day With Meals. 60 tablet 3   • CIPRODEX 0.3-0.1 % otic suspension      • doxazosin (CARDURA) 2 MG tablet take 1 tablet by mouth at bedtime 90 tablet 1   • DULoxetine (CYMBALTA) 60 MG capsule Take 1 capsule by mouth Daily. 30 capsule 6   • fluticasone (FLONASE) 50 MCG/ACT nasal spray 21 sprays into each nostril daily.     • isosorbide mononitrate (IMDUR) 60 MG 24 hr tablet Take 1 tablet by mouth Daily. 90 tablet 3   • nitroglycerin (NITROSTAT) 0.4 MG SL tablet place 1 tablet under the tongue if needed every 5 minutes fo 25 tablet 5   • omeprazole (priLOSEC) 20 MG capsule take 1 capsule by mouth once daily 90 capsule 3   • triamcinolone (KENALOG) 0.025 % cream Apply  topically 2 (Two) Times a Day. To rash 45 g 0   •  "lisinopril (PRINIVIL,ZESTRIL) 20 MG tablet Take 1 tablet by mouth Daily. 90 tablet 3   • meloxicam (MOBIC) 7.5 MG tablet Take 1 tablet by mouth Every Morning. Take with food. 30 tablet 1     No facility-administered medications prior to visit.        Patient Active Problem List   Diagnosis   • Arthralgia of multiple joints   • Generalized anxiety disorder   • Gastroesophageal reflux disease without esophagitis   • Hyperlipidemia   • Hypertension   • Insomnia   • Screening PSA (prostate specific antigen)   • Bradycardia   • Chronic left-sided low back pain without sciatica   • Idiopathic peripheral neuropathy   • Encounter for Medicare annual wellness exam   • Benign prostatic hyperplasia with nocturia   • Plantar fasciitis, left       Advanced Care Planning:  ACP discussion was held with the patient during this visit. Patient does not have an advance directive, information provided.    Review of Systems   HENT: Positive for hearing loss and tinnitus.    Eyes: Negative.    Respiratory: Positive for shortness of breath.    Cardiovascular: Negative.    Gastrointestinal: Negative.    Endocrine: Negative.    Genitourinary: Positive for frequency.        Urinates 2-3 times a night   Musculoskeletal: Positive for arthralgias, back pain and neck pain.   Skin: Negative.    Allergic/Immunologic: Negative.    Neurological: Positive for dizziness and weakness.   Hematological: Bruises/bleeds easily.   Psychiatric/Behavioral: Positive for sleep disturbance.       Compared to one year ago, the patient feels his physical health is the same.  Compared to one year ago, the patient feels his mental health is the same.    Reviewed chart for potential of high risk medication in the elderly: yes  Reviewed chart for potential of harmful drug interactions in the elderly:yes    Objective         Vitals:    02/05/20 0819   BP: 140/80   Pulse: 68   Temp: 98.1 °F (36.7 °C)   SpO2: 98%   Weight: 106 kg (233 lb)   Height: 180.3 cm (71\") "   PainSc: 0-No pain       Body mass index is 32.5 kg/m².  Discussed the patient's BMI with him. The BMI is above average; BMI management plan is completed.    Physical Exam   Constitutional: He is oriented to person, place, and time. He appears well-developed and well-nourished. No distress.   Very pleasant gentleman who appears his stated age, he is in no obvious distress today   HENT:   Head: Normocephalic and atraumatic.   Right Ear: External ear normal.   Left Ear: External ear normal.   Mouth/Throat: Oropharynx is clear and moist.   Hearing aids in place, these were removed and he has a perforation of right tympanic membrane, left tympanic membrane is slightly dull with dryness noted in canal oropharynx clear, mucous membranes moist   Eyes: Pupils are equal, round, and reactive to light. Conjunctivae and EOM are normal.   Neck: Normal range of motion. Neck supple. No thyromegaly present.   Cardiovascular: Normal rate, regular rhythm, normal heart sounds and intact distal pulses.   No murmur heard.  No carotid bruits   Pulmonary/Chest: Effort normal and breath sounds normal. No respiratory distress. He has no wheezes.   Abdominal: Soft. Bowel sounds are normal. He exhibits no distension. There is no tenderness.   Musculoskeletal: Normal range of motion. He exhibits no edema.   Lymphadenopathy:     He has no cervical adenopathy.   Neurological: He is alert and oriented to person, place, and time. No cranial nerve deficit. Coordination normal.   Skin: No rash noted.   Psychiatric: He has a normal mood and affect. His behavior is normal. Judgment and thought content normal.   Nursing note and vitals reviewed.            Assessment/Plan   Medicare Risks and Personalized Health Plan  CMS Preventative Services Quick Reference  Advance Directive Discussion  Cardiovascular risk  Depression/Dysphoria  Diabetic Lab Screening   Hearing Problem  Inactivity/Sedentary  Obesity/Overweight   Prostate Cancer Screening     The  above risks/problems have been discussed with the patient.  Pertinent information has been shared with the patient in the After Visit Summary.  Follow up plans and orders are seen below in the Assessment/Plan Section.    Diagnoses and all orders for this visit:    1. Encounter for Medicare annual wellness exam (Primary)  -     CBC & Differential  -     Comprehensive Metabolic Panel  -     Lipid Panel With / Chol / HDL Ratio  -     TSH  -     PSA Screen    2. Essential hypertension  -     CBC & Differential  -     Comprehensive Metabolic Panel  -     Lipid Panel With / Chol / HDL Ratio    3. Mixed hyperlipidemia  -     Comprehensive Metabolic Panel  -     Lipid Panel With / Chol / HDL Ratio    4. Gastroesophageal reflux disease without esophagitis  -     CBC & Differential    5. Benign prostatic hyperplasia with nocturia    6. Generalized anxiety disorder  -     TSH    7. Screening PSA (prostate specific antigen)  -     PSA Screen    Other orders  -     meloxicam (MOBIC) 7.5 MG tablet; Take 1 tablet by mouth Every Morning. Take with food.  Dispense: 30 tablet; Refill: 1  -     lisinopril (PRINIVIL,ZESTRIL) 20 MG tablet; Take 1 tablet by mouth Daily.  Dispense: 90 tablet; Refill: 3    Labs as noted  Living will info provided-request copy for chart when completed  Recommend glucerna before bedtime due to complaints of weakness in middle of night and possible low blood sugars  Recommend HC 1% in left ear canal for dryness  Continue duloxetine for underlying anxiety and depression symptoms  Continue Cardura for BPH, he denies any symptoms of hesitancy or urgency, he does have some frequency issues but declines offer to see urology for this  Continue omeprazole for GERD, currently well controlled  Follow heart healthy/low salt diet  Avoid processed foods  Monitor blood pressure as discussed  Exercise as tolerated up to 30 minutes 5 days per week  Take all medications as prescribed        Follow Up:  Return in about 4  months (around 6/5/2020) for Next scheduled follow up.     An After Visit Summary and PPPS were given to the patient.

## 2020-02-06 RX ORDER — ALPRAZOLAM 0.25 MG/1
TABLET ORAL
Qty: 30 TABLET | OUTPATIENT
Start: 2020-02-06

## 2020-02-07 ENCOUNTER — TELEPHONE (OUTPATIENT)
Dept: INTERNAL MEDICINE | Facility: CLINIC | Age: 75
End: 2020-02-07

## 2020-02-07 RX ORDER — ALPRAZOLAM 0.25 MG/1
0.25 TABLET ORAL DAILY
Qty: 30 TABLET | Refills: 2 | Status: CANCELLED | OUTPATIENT
Start: 2020-02-07

## 2020-02-07 NOTE — TELEPHONE ENCOUNTER
Pt called stating he was in last week and his xanax meds didn't get refilled. Pt stated he left a list last week and only 2 of the meds were sent in. Please call pt to advise.

## 2020-02-07 NOTE — TELEPHONE ENCOUNTER
Spoke with Pt's wife, advised her refill isn't due until 2/15. Advised wife I can refill later next week. Wife understood.

## 2020-02-13 DIAGNOSIS — F41.1 GENERALIZED ANXIETY DISORDER: Primary | ICD-10-CM

## 2020-02-13 RX ORDER — ALPRAZOLAM 0.25 MG/1
0.25 TABLET ORAL DAILY
Qty: 30 TABLET | Refills: 2 | Status: SHIPPED | OUTPATIENT
Start: 2020-02-13 | End: 2020-05-28

## 2020-02-21 ENCOUNTER — TELEPHONE (OUTPATIENT)
Dept: INTERNAL MEDICINE | Facility: CLINIC | Age: 75
End: 2020-02-21

## 2020-02-21 NOTE — TELEPHONE ENCOUNTER
Patient was admitted to [facility] in [place] and had 2 stents placed 2/20/20 and was D/C in stable condition on 2/21/20.  He has a hospital follow up scheduled with Dr. Vermeesch 2/27/20 at 2:00.  Thank you

## 2020-02-24 ENCOUNTER — TRANSITIONAL CARE MANAGEMENT TELEPHONE ENCOUNTER (OUTPATIENT)
Dept: INTERNAL MEDICINE | Facility: CLINIC | Age: 75
End: 2020-02-24

## 2020-02-24 NOTE — OUTREACH NOTE
"TCM call completed.  See TCM flowsheet for details.  Does have upcoming hospital follow up appt with Dr. Rodriguez 2/27/2020.  Wife answered phone and states they are actually out to eat at restaurant, so call kept brief.  She did say he was doing well.  Up and about.  Groin incision is clean and dry.  He has obtained all the new prescriptions and is taking without issue. No further chest pain.   Does check his bp at home and she states it is \"pretty good and actually sometimes a little low by the end of day.\"   Denies any needs at present, confirmed his hospital f/u appt and appreciated the call.     "

## 2020-02-27 ENCOUNTER — OFFICE VISIT (OUTPATIENT)
Dept: INTERNAL MEDICINE | Facility: CLINIC | Age: 75
End: 2020-02-27

## 2020-02-27 VITALS
BODY MASS INDEX: 32.06 KG/M2 | HEIGHT: 71 IN | SYSTOLIC BLOOD PRESSURE: 126 MMHG | OXYGEN SATURATION: 97 % | WEIGHT: 229 LBS | HEART RATE: 65 BPM | DIASTOLIC BLOOD PRESSURE: 74 MMHG | TEMPERATURE: 97.2 F

## 2020-02-27 DIAGNOSIS — I10 ESSENTIAL HYPERTENSION: ICD-10-CM

## 2020-02-27 DIAGNOSIS — I25.10 CORONARY ARTERY DISEASE INVOLVING NATIVE CORONARY ARTERY OF NATIVE HEART WITHOUT ANGINA PECTORIS: Primary | ICD-10-CM

## 2020-02-27 PROCEDURE — 99495 TRANSJ CARE MGMT MOD F2F 14D: CPT | Performed by: INTERNAL MEDICINE

## 2020-02-27 RX ORDER — CLOPIDOGREL BISULFATE 75 MG/1
TABLET ORAL DAILY
COMMUNITY
Start: 2020-02-21 | End: 2021-07-23

## 2020-02-27 RX ORDER — ATORVASTATIN CALCIUM 40 MG/1
TABLET, FILM COATED ORAL DAILY
COMMUNITY
Start: 2020-02-21 | End: 2021-02-08 | Stop reason: SDUPTHER

## 2020-02-27 NOTE — PROGRESS NOTES
"Chief Complaint   Patient presents with   • Transitional Care Management     Pt went to [facility] ER on 2/19/2020 due to chest pain. Pt had two stents placed.      Subjective   Tristan Hillman is a 74 y.o. male.     Here today for TCM after hospital admit to [facility] for CP from 2/19-21/2020.   He was having CP for 3 days and then presented to ER for evaluation.    His initial EKG and cardiac enzymes were negative.  He was admitted to cardiac service and underwent left cardiac cath.  He required a stent to RAD artery.  He was placed on plavix, ASA and lipitor .   He did have a little bit of pain in the shower the day after he got home.  He denies any further pain.  He is less SOA than he was in the past.  He feels more SOA in the morning.    He still does not sleep well.  He is not awakening weak like he used to.   No HA, rare dizziness/lightheadedness.    He is eating well.        The following portions of the patient's history were reviewed and updated as appropriate: allergies, current medications, past family history, past medical history, past social history, past surgical history and problem list.    Review of Systems   Constitutional: Negative for activity change, appetite change and unexpected weight change.   Eyes: Negative for visual disturbance.   Respiratory: Negative for shortness of breath.    Cardiovascular: Positive for chest pain and leg swelling. Negative for palpitations.   Gastrointestinal: Negative for abdominal pain.   Neurological: Positive for dizziness and weakness. Negative for headaches.   Psychiatric/Behavioral: Positive for sleep disturbance.       Objective   /74   Pulse 65   Temp 97.2 °F (36.2 °C)   Ht 180.3 cm (71\")   Wt 104 kg (229 lb)   SpO2 97%   BMI 31.94 kg/m²   Body mass index is 31.94 kg/m².  Physical Exam   Constitutional: He is oriented to person, place, and time. He appears well-developed and well-nourished. No distress.   Pleasant man, appears stated " age, no distress today   HENT:   Head: Normocephalic and atraumatic.   Right Ear: External ear normal.   Left Ear: External ear normal.   Mouth/Throat: Oropharynx is clear and moist.   Hearing aids in place   Eyes: Pupils are equal, round, and reactive to light. Conjunctivae and EOM are normal.   Neck: Normal range of motion. Neck supple. No thyromegaly present.   Cardiovascular: Normal rate, regular rhythm and intact distal pulses.   Murmur heard.  No carotid bruits  Systolic murmur grade 1/6 heard over precordium   Pulmonary/Chest: Effort normal and breath sounds normal. No respiratory distress. He has no wheezes.   Abdominal: Soft. Bowel sounds are normal. He exhibits no distension. There is no tenderness.   Musculoskeletal: Normal range of motion. He exhibits edema.   +1 edema noted at ankles   Lymphadenopathy:     He has no cervical adenopathy.   Neurological: He is alert and oriented to person, place, and time. No cranial nerve deficit. Coordination normal.   Psychiatric: He has a normal mood and affect. His behavior is normal. Judgment and thought content normal.   Nursing note and vitals reviewed.      Assessment/Plan   Tristan Hillman is here today and the following problems have been addressed:      Tristan was seen today for transitional care management.    Diagnoses and all orders for this visit:    Coronary artery disease involving native coronary artery of native heart without angina pectoris    Essential hypertension    continue all current meds  Reviewed discharge summary  Encouraged him to eat HH diet and try to exercise/walk regularly  He has follow up with Dr Elder within the next one month  Monitor BP on occasion  He is not sleeping well but only take a half of his xanax, told him OK to take a whole tab or .25 mg qhs  Continue to wear compression stockings during day    RTC in June as scheduled or prn    Please note that portions of this note were completed with a voice recognition program.  Efforts  were made to edit dictation, but occasionally words are mistranscribed.

## 2020-05-06 ENCOUNTER — TELEPHONE (OUTPATIENT)
Dept: INTERNAL MEDICINE | Facility: CLINIC | Age: 75
End: 2020-05-06

## 2020-05-06 RX ORDER — AMLODIPINE BESYLATE 10 MG/1
10 TABLET ORAL DAILY
Qty: 90 TABLET | Refills: 3 | Status: SHIPPED | OUTPATIENT
Start: 2020-05-06 | End: 2021-02-08 | Stop reason: SDUPTHER

## 2020-05-06 NOTE — TELEPHONE ENCOUNTER
PT CALLED STATES HE IS NEEDING A REFILL ON THE FOLLOWING MEDICATION(S):    amLODIPine (NORVASC) 10 MG tablet      CONFIRMED PHARMACY:  [id number] [place] [address] AT [facility] [address] - [phone] [facility] [phone]       CONTACT: [phone]

## 2020-05-11 ENCOUNTER — TELEPHONE (OUTPATIENT)
Dept: INTERNAL MEDICINE | Facility: CLINIC | Age: 75
End: 2020-05-11

## 2020-05-11 RX ORDER — ISOSORBIDE MONONITRATE 60 MG/1
60 TABLET, EXTENDED RELEASE ORAL DAILY
Qty: 90 TABLET | Refills: 3 | Status: SHIPPED | OUTPATIENT
Start: 2020-05-11 | End: 2021-02-08 | Stop reason: SDUPTHER

## 2020-05-11 RX ORDER — CARVEDILOL 6.25 MG/1
TABLET ORAL
Qty: 60 TABLET | Refills: 5 | Status: SHIPPED | OUTPATIENT
Start: 2020-05-11 | End: 2020-12-30

## 2020-05-11 NOTE — TELEPHONE ENCOUNTER
PT CALLED IN REQUESTING A REFILL ON HIS isosorbide mononitrate (IMDUR) 60 MG 24 hr tablet        PT CB NUMBER: [phone] (  JOAQUÍN: ELIZA ENCARNACION  [phone]

## 2020-05-28 DIAGNOSIS — F41.1 GENERALIZED ANXIETY DISORDER: ICD-10-CM

## 2020-05-28 RX ORDER — ALPRAZOLAM 0.25 MG/1
0.25 TABLET ORAL DAILY
Qty: 30 TABLET | Refills: 2 | Status: SHIPPED | OUTPATIENT
Start: 2020-05-28 | End: 2020-09-09

## 2020-06-05 ENCOUNTER — OFFICE VISIT (OUTPATIENT)
Dept: INTERNAL MEDICINE | Facility: CLINIC | Age: 75
End: 2020-06-05

## 2020-06-05 VITALS
HEART RATE: 55 BPM | SYSTOLIC BLOOD PRESSURE: 128 MMHG | OXYGEN SATURATION: 98 % | TEMPERATURE: 96.8 F | DIASTOLIC BLOOD PRESSURE: 80 MMHG

## 2020-06-05 DIAGNOSIS — E78.2 MIXED HYPERLIPIDEMIA: ICD-10-CM

## 2020-06-05 DIAGNOSIS — G60.9 IDIOPATHIC PERIPHERAL NEUROPATHY: ICD-10-CM

## 2020-06-05 DIAGNOSIS — I10 ESSENTIAL HYPERTENSION: Primary | ICD-10-CM

## 2020-06-05 DIAGNOSIS — N40.1 BENIGN PROSTATIC HYPERPLASIA WITH NOCTURIA: ICD-10-CM

## 2020-06-05 DIAGNOSIS — R35.1 BENIGN PROSTATIC HYPERPLASIA WITH NOCTURIA: ICD-10-CM

## 2020-06-05 DIAGNOSIS — K21.9 GASTROESOPHAGEAL REFLUX DISEASE WITHOUT ESOPHAGITIS: ICD-10-CM

## 2020-06-05 DIAGNOSIS — F41.1 GENERALIZED ANXIETY DISORDER: ICD-10-CM

## 2020-06-05 DIAGNOSIS — H61.92 LESION OF SKIN OF LEFT EAR: ICD-10-CM

## 2020-06-05 DIAGNOSIS — I25.10 CORONARY ARTERY DISEASE INVOLVING NATIVE CORONARY ARTERY OF NATIVE HEART WITHOUT ANGINA PECTORIS: ICD-10-CM

## 2020-06-05 PROBLEM — M72.2 PLANTAR FASCIITIS, LEFT: Status: RESOLVED | Noted: 2019-10-31 | Resolved: 2020-06-05

## 2020-06-05 PROCEDURE — 99214 OFFICE O/P EST MOD 30 MIN: CPT | Performed by: INTERNAL MEDICINE

## 2020-06-05 NOTE — PROGRESS NOTES
Chief Complaint   Patient presents with   • Follow-up     for HLD, HTN, and anxiety. Pt would like left ear looked at. Pt states he has dizzy spells every now and then.     Subjective   Tristan Hillman is a 75 y.o. male.     Here today for follow up of HTN, HLD, anxiety, GERD, PN.   HTN/HLDCAD-  His BP is well controlled today.  His home reads run 120-140/60-80.  He denies CP, SOA, palpitations or edema.  He has been complaining of some dizzy spells intermittently though-this occurs when he is standing or bending over or doing something.  No dizziness with sitting.   Lipid panel done in February revealed LDL of 112, patient is taking atorvastatin 40 mg daily.  He had been taking imdur 60 mg BID, he sees DR Mcdermott in [place].  We have tried to decrease dose to once a day now to see if this will help his dizziness  Anxiety- he continues to take xanax every PM.  His anxiety currently is not very bothersome, he has been staying home through most of this pandemic.  He admits that he does not always wear a mask when he goes out  GERD- he denies any GERD sxs, takes omeprazole daily  PN- has some burning and numbness in feet intermittently.    BPH -he is awakening 2 times a night.  No dribbling or hesitancy.  PSA was normal in Feb 2020  Insomnia -  States that he sleeps fair with use of xanax.  He is no longer using melatonin  HCM- flu shot, Pneumovax and colonoscopy all up-to-date.  He has not had a hepatitis A or shingles vaccine  He has a scab on left ear for a few weeks.  No injury.  He is using neosporin, scab comes off and comes back.         The following portions of the patient's history were reviewed and updated as appropriate: allergies, current medications, past family history, past medical history, past social history, past surgical history and problem list.    Review of Systems   Constitutional: Negative for activity change, appetite change and unexpected weight change.   HENT: Positive for hearing loss.    Eyes:  Negative for visual disturbance.   Respiratory: Negative for shortness of breath.    Cardiovascular: Negative for chest pain, palpitations and leg swelling.   Gastrointestinal: Negative for abdominal pain.   Genitourinary:        Urinates 2-3 times at night   Musculoskeletal: Positive for arthralgias.   Skin:        Skin lesion on top of left ear   Neurological: Positive for dizziness and numbness. Negative for headaches.        Intermittent burning and numbness in feet   Psychiatric/Behavioral: Positive for sleep disturbance. Negative for dysphoric mood. The patient is nervous/anxious.        Objective   /80   Pulse 55   Temp 96.8 °F (36 °C)   SpO2 98%   There is no height or weight on file to calculate BMI.  Physical Exam   Constitutional: He is oriented to person, place, and time. He appears well-developed and well-nourished. No distress.   Very pleasant elderly gentleman, he appears his stated age, he is wearing a mask and in no distress today   HENT:   Head: Normocephalic and atraumatic.   Right Ear: External ear normal.   Left Ear: External ear normal.   Very hard of hearing   Eyes: Pupils are equal, round, and reactive to light. Conjunctivae and EOM are normal.   Neck: Normal range of motion. Neck supple. No thyromegaly present.   Cardiovascular: Normal rate, regular rhythm, normal heart sounds and intact distal pulses.   No murmur heard.  No carotid bruits  Occasional ectopic beat noted   Pulmonary/Chest: Effort normal and breath sounds normal. No respiratory distress. He has no wheezes.   Abdominal: Soft. Bowel sounds are normal. He exhibits no distension. There is no tenderness.   Abdomen is round and obese   Musculoskeletal: Normal range of motion. He exhibits no edema.   Lymphadenopathy:     He has no cervical adenopathy.   Neurological: He is alert and oriented to person, place, and time. No cranial nerve deficit. Coordination normal.   Skin:   There is a small scab noted at top of left ear  pinna   Psychiatric: He has a normal mood and affect. His behavior is normal. Judgment and thought content normal.   Nursing note and vitals reviewed.      Assessment/Plan   Tristan Hillman is here today and the following problems have been addressed:      Tristan was seen today for follow-up.    Diagnoses and all orders for this visit:    Essential hypertension    Mixed hyperlipidemia    Coronary artery disease involving native coronary artery of native heart without angina pectoris    Gastroesophageal reflux disease without esophagitis    Idiopathic peripheral neuropathy    Benign prostatic hyperplasia with nocturia    Generalized anxiety disorder    Lesion of skin of left ear  -     Ambulatory Referral to Dermatology      Decrease Imdur XL to 60 mg once daily, if patient been begins to develop chest pain, will resume 60 mg twice daily  Follow heart healthy/low salt diet  Avoid processed foods  Monitor blood pressure as discussed  Exercise as tolerated up to 150 minutes per week  Take all medications as prescribed  Continue omeprazole for GERD symptoms  Continue Cardura for underlying BPH  Anxiety and sleep are fairly well controlled with Xanax  Continue duloxetine for anxiety  Labs are up-to-date  Refer to dermatologist for left ear skin lesion      Return in about 4 months (around 10/5/2020) for Next scheduled follow up.      Marilyn K. Vermeesch, MD      Please note that portions of this note were completed with a voice recognition program.  Efforts were made to edit dictation, but occasionally words are mistranscribed.

## 2020-07-06 RX ORDER — DOXAZOSIN 2 MG/1
TABLET ORAL
Qty: 90 TABLET | Refills: 1 | Status: SHIPPED | OUTPATIENT
Start: 2020-07-06 | End: 2021-02-08 | Stop reason: SDUPTHER

## 2020-08-04 ENCOUNTER — OFFICE VISIT (OUTPATIENT)
Dept: INTERNAL MEDICINE | Facility: CLINIC | Age: 75
End: 2020-08-04

## 2020-08-04 VITALS
HEIGHT: 71 IN | SYSTOLIC BLOOD PRESSURE: 122 MMHG | TEMPERATURE: 97.1 F | WEIGHT: 225 LBS | DIASTOLIC BLOOD PRESSURE: 66 MMHG | HEART RATE: 79 BPM | OXYGEN SATURATION: 98 % | BODY MASS INDEX: 31.5 KG/M2

## 2020-08-04 DIAGNOSIS — L03.113 CELLULITIS OF RIGHT UPPER EXTREMITY: Primary | ICD-10-CM

## 2020-08-04 PROBLEM — H61.92 LESION OF SKIN OF LEFT EAR: Status: RESOLVED | Noted: 2020-06-05 | Resolved: 2020-08-04

## 2020-08-04 PROCEDURE — 99213 OFFICE O/P EST LOW 20 MIN: CPT | Performed by: INTERNAL MEDICINE

## 2020-08-04 RX ORDER — DOXYCYCLINE HYCLATE 100 MG/1
100 CAPSULE ORAL 2 TIMES DAILY WITH MEALS
Qty: 20 CAPSULE | Refills: 0 | Status: SHIPPED | OUTPATIENT
Start: 2020-08-04 | End: 2021-02-08

## 2020-08-04 NOTE — PROGRESS NOTES
"Chief Complaint   Patient presents with   • Abstract     Pt has large red, itchy place on left forearm X 4 days.      Subjective   Tristan Hillman is a 75 y.o. male.     Patient is here today with complaints of a large itchy area on left forearm for past 4 days.  He denies any fevers or chills.  No NV.  Not painful unless he presses on it.  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  He is on Plavix and aspirin and bruises easily.  He denies any recent trauma of left forearm.         The following portions of the patient's history were reviewed and updated as appropriate: allergies, current medications, past family history, past medical history, past social history, past surgical history and problem list.    Review of Systems   Constitutional: Negative for chills and fever.   Musculoskeletal: Negative for arthralgias.   Skin: Positive for color change and rash. Negative for wound.   Hematological: Bruises/bleeds easily.       Objective   /66   Pulse 79   Temp 97.1 °F (36.2 °C)   Ht 180.3 cm (71\")   Wt 102 kg (225 lb)   SpO2 98%   BMI 31.38 kg/m²   Body mass index is 31.38 kg/m².  Physical Exam   Constitutional: He is oriented to person, place, and time. He appears well-developed and well-nourished. No distress.   Very kind and pleasant man, wearing his mask and in no distress today   HENT:   Head: Normocephalic and atraumatic.   Right Ear: External ear normal.   Left Ear: External ear normal.   Hearing aids in place   Eyes: EOM are normal.   Pulmonary/Chest: Effort normal. No respiratory distress.   Musculoskeletal:   Right upper extremity forearm medial/palmar aspect with area of raised erythema measuring approximately 15 cm² in size, area is slightly warm to palpation, dorsum of the arm with brawny skin color changes and few areas of senile " purpura/ecchymosis, there are 2 small scabbed areas but no surrounding erythema or signs of infection   Neurological: He is alert and oriented to person, place, and time.   Psychiatric: He has a normal mood and affect. His behavior is normal. Judgment and thought content normal.   Nursing note and vitals reviewed.      Assessment/Plan   Tristan Hillman is here today and the following problems have been addressed:      Tristan was seen today for abstract.    Diagnoses and all orders for this visit:    Cellulitis of right upper extremity  -     doxycycline (VIBRAMYCIN) 100 MG capsule; Take 1 capsule by mouth 2 (Two) Times a Day With Meals.    Patient given doxycycline 100 mg twice daily for 10 days for underlying cellulitis  Recommend heat to forearm for 20 minutes twice daily  Elevate forearm at end of day or whenever there is any significant edema    Return to clinic if symptoms worsen or persist    Please note that portions of this note were completed with a voice recognition program.  Efforts were made to edit dictation, but occasionally words are mistranscribed.

## 2020-08-16 DIAGNOSIS — L03.113 CELLULITIS OF RIGHT UPPER EXTREMITY: ICD-10-CM

## 2020-08-17 RX ORDER — DOXYCYCLINE HYCLATE 100 MG/1
CAPSULE ORAL
Qty: 20 CAPSULE | Refills: 0 | OUTPATIENT
Start: 2020-08-17

## 2020-09-03 DIAGNOSIS — F41.1 GENERALIZED ANXIETY DISORDER: ICD-10-CM

## 2020-09-09 RX ORDER — ALPRAZOLAM 0.25 MG/1
0.25 TABLET ORAL DAILY
Qty: 30 TABLET | Refills: 2 | Status: SHIPPED | OUTPATIENT
Start: 2020-09-09 | End: 2020-12-14 | Stop reason: SDUPTHER

## 2020-10-06 ENCOUNTER — OFFICE VISIT (OUTPATIENT)
Dept: INTERNAL MEDICINE | Facility: CLINIC | Age: 75
End: 2020-10-06

## 2020-10-06 VITALS
HEART RATE: 55 BPM | DIASTOLIC BLOOD PRESSURE: 68 MMHG | TEMPERATURE: 97 F | SYSTOLIC BLOOD PRESSURE: 132 MMHG | WEIGHT: 222 LBS | OXYGEN SATURATION: 97 % | BODY MASS INDEX: 31.08 KG/M2 | HEIGHT: 71 IN

## 2020-10-06 DIAGNOSIS — I10 ESSENTIAL HYPERTENSION: Primary | ICD-10-CM

## 2020-10-06 DIAGNOSIS — K21.9 GASTROESOPHAGEAL REFLUX DISEASE WITHOUT ESOPHAGITIS: ICD-10-CM

## 2020-10-06 DIAGNOSIS — E78.2 MIXED HYPERLIPIDEMIA: ICD-10-CM

## 2020-10-06 DIAGNOSIS — F41.1 GENERALIZED ANXIETY DISORDER: ICD-10-CM

## 2020-10-06 PROBLEM — L03.113 CELLULITIS OF RIGHT UPPER EXTREMITY: Status: RESOLVED | Noted: 2020-08-04 | Resolved: 2020-10-06

## 2020-10-06 PROCEDURE — G0008 ADMIN INFLUENZA VIRUS VAC: HCPCS | Performed by: INTERNAL MEDICINE

## 2020-10-06 PROCEDURE — 99214 OFFICE O/P EST MOD 30 MIN: CPT | Performed by: INTERNAL MEDICINE

## 2020-10-06 PROCEDURE — 90694 VACC AIIV4 NO PRSRV 0.5ML IM: CPT | Performed by: INTERNAL MEDICINE

## 2020-10-06 NOTE — PROGRESS NOTES
"Chief Complaint   Patient presents with   • Follow-up     for GERD, HLD, HTN, and anxiety.      Subjective   Tristan Hillman is a 75 y.o. male.     Here today for follow up of HTN, HLD, anxiety, GERD, PN.   HTN/HLDCAD-  His BP is well controlled today.  He denies CP, palpitations, some edema.  He has cut back on portion sizes-he does not eat much fruits or veg.  He has not been doing any walking or exercise.   Anxiety- he continues to take xanax every PM.  His anxiety is doing well.  He does not sleep well, never has.  He does stay up late night on the TV  GERD- he denies any GERD sxs, takes omeprazole daily  PN- has some burning and numbness in feet intermittently at night.    BPH -he is awakening 2 times a night.  No dribbling or hesitancy.  PSA was normal in Feb 2020  Insomnia -  States that he sleeps fair with use of xanax.  He is no longer using melatonin  HCM-  Pneumovax and colonoscopy all up-to-date.  He has not had a hepatitis A or shingles vaccine.  He will get flu vaccine today.        The following portions of the patient's history were reviewed and updated as appropriate: allergies, current medications, past family history, past medical history, past social history, past surgical history and problem list.    Review of Systems   Constitutional: Negative for activity change, appetite change and unexpected weight change.   Eyes: Negative for visual disturbance.   Respiratory: Negative for shortness of breath.    Cardiovascular: Positive for leg swelling. Negative for chest pain and palpitations.   Gastrointestinal: Negative for abdominal pain.   Genitourinary: Negative.    Musculoskeletal: Positive for arthralgias.   Neurological: Negative for dizziness and headaches.   Psychiatric/Behavioral: Positive for sleep disturbance. Negative for dysphoric mood. The patient is not nervous/anxious.        Objective   /68   Pulse 55   Temp 97 °F (36.1 °C)   Ht 180.3 cm (71\")   Wt 101 kg (222 lb)   SpO2 97%   " BMI 30.96 kg/m²   Body mass index is 30.96 kg/m².  Physical Exam  Vitals signs and nursing note reviewed.   Constitutional:       General: He is not in acute distress.     Appearance: Normal appearance. He is well-developed. He is obese. He is not ill-appearing.      Comments: Pleasant man, wearing a mask, in no distress today   HENT:      Head: Normocephalic and atraumatic.      Right Ear: External ear normal.      Left Ear: External ear normal.   Eyes:      General:         Right eye: No discharge.         Left eye: No discharge.      Extraocular Movements: Extraocular movements intact.      Conjunctiva/sclera: Conjunctivae normal.      Pupils: Pupils are equal, round, and reactive to light.   Neck:      Musculoskeletal: Normal range of motion and neck supple.      Thyroid: No thyromegaly.   Cardiovascular:      Rate and Rhythm: Normal rate and regular rhythm.      Pulses: Normal pulses.      Heart sounds: Normal heart sounds. No murmur.      Comments: No carotid bruits  Pulmonary:      Effort: Pulmonary effort is normal. No respiratory distress.      Breath sounds: Normal breath sounds. No wheezing.   Abdominal:      General: Bowel sounds are normal. There is no distension.      Palpations: Abdomen is soft.      Tenderness: There is no abdominal tenderness.   Musculoskeletal: Normal range of motion.      Right lower leg: Edema present.      Left lower leg: Edema present.      Comments: +1 edema at ankles   Lymphadenopathy:      Cervical: No cervical adenopathy.   Neurological:      General: No focal deficit present.      Mental Status: He is alert and oriented to person, place, and time. Mental status is at baseline.      Cranial Nerves: No cranial nerve deficit.      Coordination: Coordination normal.   Psychiatric:         Behavior: Behavior normal.         Thought Content: Thought content normal.         Judgment: Judgment normal.      Comments: Flat affect noted today         Assessment/Plan   Tristan Hillman is  here today and the following problems have been addressed:      Tristan was seen today for follow-up.    Diagnoses and all orders for this visit:    Essential hypertension    Mixed hyperlipidemia    Gastroesophageal reflux disease without esophagitis    Generalized anxiety disorder    Follow heart healthy/low salt diet  Avoid processed foods  Monitor blood pressure as discussed  Exercise as tolerated up to 150 minutes per week  Take all medications as prescribed  Continue duloxetine for underlying anxiety and arthritis  Continue low-dose Xanax to help with anxiety and sleep at night  Patient remains on atorvastatin to help with underlying hyperlipidemia, encouraged him to eat a better diet and try to exercise more  Continue omeprazole for underlying GERD, currently well controlled  Flu vaccine given today  Encouraged patient to wear his mask regularly, he is unhappy that he has to wear his mask as he states it worsens his shortness of breath  Labs due on next office visit    Return in about 4 months (around 2/6/2021) for Medicare Wellness.      Marilyn K. Vermeesch, MD      Please note that portions of this note were completed with a voice recognition program.  Efforts were made to edit dictation, but occasionally words are mistranscribed.

## 2020-11-16 ENCOUNTER — TRANSCRIBE ORDERS (OUTPATIENT)
Dept: ADMINISTRATIVE | Facility: HOSPITAL | Age: 75
End: 2020-11-16

## 2020-11-16 DIAGNOSIS — Z01.818 OTHER SPECIFIED PRE-OPERATIVE EXAMINATION: Primary | ICD-10-CM

## 2020-12-14 DIAGNOSIS — F41.1 GENERALIZED ANXIETY DISORDER: ICD-10-CM

## 2020-12-14 RX ORDER — ALPRAZOLAM 0.25 MG/1
0.25 TABLET ORAL DAILY
Qty: 30 TABLET | Refills: 2 | Status: SHIPPED | OUTPATIENT
Start: 2020-12-14 | End: 2021-03-16

## 2020-12-14 NOTE — TELEPHONE ENCOUNTER
Last filled: 9/9/20  Days worth: 30  Refills: 2    Last appointment: 10/6/20  Next appointment: 2/8/21    Labs: FABY

## 2020-12-30 RX ORDER — CARVEDILOL 6.25 MG/1
TABLET ORAL
Qty: 60 TABLET | Refills: 5 | Status: SHIPPED | OUTPATIENT
Start: 2020-12-30 | End: 2021-02-08 | Stop reason: SDUPTHER

## 2021-02-04 RX ORDER — OMEPRAZOLE 20 MG/1
CAPSULE, DELAYED RELEASE ORAL
Qty: 90 CAPSULE | Refills: 3 | Status: SHIPPED | OUTPATIENT
Start: 2021-02-04 | End: 2022-01-20

## 2021-02-08 ENCOUNTER — OFFICE VISIT (OUTPATIENT)
Dept: INTERNAL MEDICINE | Facility: CLINIC | Age: 76
End: 2021-02-08

## 2021-02-08 VITALS
DIASTOLIC BLOOD PRESSURE: 70 MMHG | OXYGEN SATURATION: 97 % | SYSTOLIC BLOOD PRESSURE: 128 MMHG | HEART RATE: 48 BPM | WEIGHT: 224 LBS | TEMPERATURE: 97.1 F | HEIGHT: 71 IN | BODY MASS INDEX: 31.36 KG/M2

## 2021-02-08 DIAGNOSIS — K21.9 GASTROESOPHAGEAL REFLUX DISEASE WITHOUT ESOPHAGITIS: ICD-10-CM

## 2021-02-08 DIAGNOSIS — F41.1 GENERALIZED ANXIETY DISORDER: ICD-10-CM

## 2021-02-08 DIAGNOSIS — E78.2 MIXED HYPERLIPIDEMIA: ICD-10-CM

## 2021-02-08 DIAGNOSIS — I10 ESSENTIAL HYPERTENSION: ICD-10-CM

## 2021-02-08 DIAGNOSIS — Z00.00 ENCOUNTER FOR MEDICARE ANNUAL WELLNESS EXAM: Primary | ICD-10-CM

## 2021-02-08 DIAGNOSIS — Z12.5 SCREENING PSA (PROSTATE SPECIFIC ANTIGEN): ICD-10-CM

## 2021-02-08 DIAGNOSIS — E66.09 CLASS 1 OBESITY DUE TO EXCESS CALORIES WITH SERIOUS COMORBIDITY AND BODY MASS INDEX (BMI) OF 31.0 TO 31.9 IN ADULT: ICD-10-CM

## 2021-02-08 PROCEDURE — 1126F AMNT PAIN NOTED NONE PRSNT: CPT | Performed by: INTERNAL MEDICINE

## 2021-02-08 PROCEDURE — 1170F FXNL STATUS ASSESSED: CPT | Performed by: INTERNAL MEDICINE

## 2021-02-08 PROCEDURE — 1159F MED LIST DOCD IN RCRD: CPT | Performed by: INTERNAL MEDICINE

## 2021-02-08 PROCEDURE — G0439 PPPS, SUBSEQ VISIT: HCPCS | Performed by: INTERNAL MEDICINE

## 2021-02-08 PROCEDURE — 96160 PT-FOCUSED HLTH RISK ASSMT: CPT | Performed by: INTERNAL MEDICINE

## 2021-02-08 PROCEDURE — 99397 PER PM REEVAL EST PAT 65+ YR: CPT | Performed by: INTERNAL MEDICINE

## 2021-02-08 RX ORDER — LISINOPRIL 20 MG/1
20 TABLET ORAL DAILY
Qty: 90 TABLET | Refills: 3 | Status: SHIPPED | OUTPATIENT
Start: 2021-02-08 | End: 2022-03-01

## 2021-02-08 RX ORDER — CARVEDILOL 6.25 MG/1
6.25 TABLET ORAL 2 TIMES DAILY
Qty: 180 TABLET | Refills: 1 | Status: SHIPPED | OUTPATIENT
Start: 2021-02-08 | End: 2021-07-23 | Stop reason: SDUPTHER

## 2021-02-08 RX ORDER — ATORVASTATIN CALCIUM 40 MG/1
40 TABLET, FILM COATED ORAL DAILY
Qty: 90 TABLET | Refills: 1 | Status: SHIPPED | OUTPATIENT
Start: 2021-02-08 | End: 2021-07-23 | Stop reason: SDUPTHER

## 2021-02-08 RX ORDER — TRIAMCINOLONE ACETONIDE 0.25 MG/G
CREAM TOPICAL 2 TIMES DAILY
Qty: 45 G | Refills: 0 | Status: SHIPPED | OUTPATIENT
Start: 2021-02-08 | End: 2022-05-24 | Stop reason: SDUPTHER

## 2021-02-08 RX ORDER — DOXAZOSIN 2 MG/1
2 TABLET ORAL
Qty: 90 TABLET | Refills: 1 | Status: SHIPPED | OUTPATIENT
Start: 2021-02-08 | End: 2021-07-23 | Stop reason: SDUPTHER

## 2021-02-08 RX ORDER — ISOSORBIDE MONONITRATE 60 MG/1
60 TABLET, EXTENDED RELEASE ORAL DAILY
Qty: 90 TABLET | Refills: 3 | Status: SHIPPED | OUTPATIENT
Start: 2021-02-08 | End: 2021-12-21

## 2021-02-08 RX ORDER — AMLODIPINE BESYLATE 10 MG/1
10 TABLET ORAL DAILY
Qty: 90 TABLET | Refills: 3 | Status: SHIPPED | OUTPATIENT
Start: 2021-02-08 | End: 2022-02-23

## 2021-02-08 NOTE — PROGRESS NOTES
The ABCs of the Annual Wellness Visit  Subsequent Medicare Wellness Visit    Chief Complaint   Patient presents with   • Medicare Wellness-subsequent       Subjective   History of Present Illness:  Tristan Hillman is a 75 y.o. male who presents for a Subsequent Medicare Wellness Visit.  PMH of HTN, HLD, CAD, GERD, BPH, anxiety, LBP, PN, insomnia. Colonoscopy 2019.  Has had pneumococcal and flu vaccine.   BP is good today.  About the same at home.  HR is slightly low, it runs about 50-70 at home.  He denies any CP, SOA, palpitations, rare HA.  No GERD with use of omeprazole. No current anxiety.  He does not sleep well, this is unchanged. He awakens 1-2 times to urinate.     HEALTH RISK ASSESSMENT    Recent Hospitalizations:  No hospitalization(s) within the last year.    Current Medical Providers:  Patient Care Team:  Vermeesch, Marilyn K, MD as PCP - General (Internal Medicine & Pediatrics)    Smoking Status:  Social History     Tobacco Use   Smoking Status Former Smoker   • Types: Cigarettes   Smokeless Tobacco Never Used       Alcohol Consumption:  Social History     Substance and Sexual Activity   Alcohol Use Yes       Depression Screen:   PHQ-2/PHQ-9 Depression Screening 2/8/2021   Little interest or pleasure in doing things 0   Feeling down, depressed, or hopeless 0   Trouble falling or staying asleep, or sleeping too much -   Feeling tired or having little energy -   Poor appetite or overeating -   Feeling bad about yourself - or that you are a failure or have let yourself or your family down -   Trouble concentrating on things, such as reading the newspaper or watching television -   Moving or speaking so slowly that other people could have noticed. Or the opposite - being so fidgety or restless that you have been moving around a lot more than usual -   Thoughts that you would be better off dead, or of hurting yourself in some way -   Total Score 0   If you checked off any problems, how difficult have these  problems made it for you to do your work, take care of things at home, or get along with other people? -       Fall Risk Screen:  DINO Fall Risk Assessment was completed, and patient is at LOW risk for falls.Assessment completed on:2/8/2021    Health Habits and Functional and Cognitive Screening:  Functional & Cognitive Status 2/8/2021   Do you have difficulty preparing food and eating? No   Do you have difficulty bathing yourself, getting dressed or grooming yourself? No   Do you have difficulty using the toilet? No   Do you have difficulty moving around from place to place? No   Do you have trouble with steps or getting out of a bed or a chair? No   Current Diet Unhealthy Diet   Dental Exam Not up to date   Eye Exam Up to date   Exercise (times per week) 0 times per week   Current Exercise Activities Include None   Do you need help using the phone?  No   Are you deaf or do you have serious difficulty hearing?  Yes   Do you need help with transportation? No   Do you need help shopping? No   Do you need help preparing meals?  No   Do you need help with housework?  No   Do you need help with laundry? No   Do you need help taking your medications? No   Do you need help managing money? No   Do you ever drive or ride in a car without wearing a seat belt? No   Have you felt unusual stress, anger or loneliness in the last month? No   Who do you live with? Spouse   If you need help, do you have trouble finding someone available to you? No   Do you have difficulty concentrating, remembering or making decisions? No         Does the patient have evidence of cognitive impairment? No    Asprin use counseling:Taking ASA appropriately as indicated    Age-appropriate Screening Schedule:  Refer to the list below for future screening recommendations based on patient's age, sex and/or medical conditions. Orders for these recommended tests are listed in the plan section. The patient has been provided with a written plan.    Health  Maintenance   Topic Date Due   • ZOSTER VACCINE (2 of 3) 08/20/2017   • LIPID PANEL  02/05/2021   • COLONOSCOPY  08/12/2029   • INFLUENZA VACCINE  Completed   • TDAP/TD VACCINES  Discontinued          The following portions of the patient's history were reviewed and updated as appropriate: allergies, current medications, past family history, past medical history, past social history, past surgical history and problem list.    Outpatient Medications Prior to Visit   Medication Sig Dispense Refill   • ALPRAZolam (XANAX) 0.25 MG tablet Take 1 tablet by mouth Daily. 30 tablet 2   • aspirin 81 MG tablet Take 1 tablet by mouth Daily. 30 tablet 0   • clopidogrel (PLAVIX) 75 MG tablet Daily.     • fluticasone (FLONASE) 50 MCG/ACT nasal spray 21 sprays into each nostril daily.     • nitroglycerin (NITROSTAT) 0.4 MG SL tablet place 1 tablet under the tongue if needed every 5 minutes fo 25 tablet 5   • omeprazole (priLOSEC) 20 MG capsule TAKE 1 CAPSULE BY MOUTH ONCE DAILY 90 capsule 3   • amLODIPine (NORVASC) 10 MG tablet Take 1 tablet by mouth Daily. 90 tablet 3   • atorvastatin (LIPITOR) 40 MG tablet Daily.     • carvedilol (COREG) 6.25 MG tablet TAKE 1 TABLET BY MOUTH TWICE DAILY 60 tablet 5   • doxazosin (CARDURA) 2 MG tablet TAKE 1 TABLET BY MOUTH EVERY DAY AT BEDTIME 90 tablet 1   • doxycycline (VIBRAMYCIN) 100 MG capsule Take 1 capsule by mouth 2 (Two) Times a Day With Meals. 20 capsule 0   • DULoxetine (CYMBALTA) 60 MG capsule Take 1 capsule by mouth Daily. 30 capsule 6   • isosorbide mononitrate (IMDUR) 60 MG 24 hr tablet Take 1 tablet by mouth Daily. 90 tablet 3   • lisinopril (PRINIVIL,ZESTRIL) 20 MG tablet Take 1 tablet by mouth Daily. 90 tablet 3   • triamcinolone (KENALOG) 0.025 % cream Apply  topically 2 (Two) Times a Day. To rash 45 g 0     No facility-administered medications prior to visit.        Patient Active Problem List   Diagnosis   • Arthralgia of multiple joints   • Generalized anxiety disorder   •  "Gastroesophageal reflux disease without esophagitis   • Hyperlipidemia   • Hypertension   • Insomnia   • Screening PSA (prostate specific antigen)   • Bradycardia   • Chronic left-sided low back pain without sciatica   • Idiopathic peripheral neuropathy   • Encounter for Medicare annual wellness exam   • Benign prostatic hyperplasia with nocturia   • Coronary artery disease involving native coronary artery without angina pectoris       Advanced Care Planning:  ACP discussion was held with the patient during this visit. Patient does not have an advance directive, information provided.    Review of Systems   Constitutional: Negative.    HENT: Positive for hearing loss.    Eyes: Negative.    Respiratory: Negative.    Cardiovascular: Negative.    Gastrointestinal: Negative.    Endocrine: Negative.    Genitourinary: Positive for frequency.   Musculoskeletal: Positive for arthralgias.   Skin: Negative.    Allergic/Immunologic: Negative.    Neurological: Negative.    Hematological: Bruises/bleeds easily.   Psychiatric/Behavioral: Positive for sleep disturbance.       Compared to one year ago, the patient feels his physical health is the same.  Compared to one year ago, the patient feels his mental health is the same.    Reviewed chart for potential of high risk medication in the elderly: yes  Reviewed chart for potential of harmful drug interactions in the elderly:yes    Objective         Vitals:    02/08/21 0917   BP: 128/70   Pulse: (!) 48   Temp: 97.1 °F (36.2 °C)   SpO2: 97%   Weight: 102 kg (224 lb)   Height: 180.3 cm (71\")   PainSc: 0-No pain       Body mass index is 31.24 kg/m².  Discussed the patient's BMI with him. The BMI is above average; BMI management plan is completed.    Physical Exam  Vitals signs and nursing note reviewed.   Constitutional:       General: He is not in acute distress.     Appearance: Normal appearance. He is well-developed. He is obese. He is not ill-appearing.      Comments: Very kind and " pleasant man, appears his age and in no distress today   HENT:      Head: Normocephalic and atraumatic.      Right Ear: Ear canal and external ear normal.      Left Ear: Tympanic membrane, ear canal and external ear normal.      Ears:      Comments: Hearing aids removed, Right TM perforation is noted  Eyes:      General:         Right eye: No discharge.         Left eye: No discharge.      Extraocular Movements: Extraocular movements intact.      Conjunctiva/sclera: Conjunctivae normal.      Pupils: Pupils are equal, round, and reactive to light.   Neck:      Musculoskeletal: Normal range of motion and neck supple.      Thyroid: No thyromegaly.      Comments: No thyromegaly or mass  Cardiovascular:      Rate and Rhythm: Regular rhythm. Bradycardia present.      Pulses: Normal pulses.      Heart sounds: Normal heart sounds. No murmur.      Comments: No carotid bruits  Heart rate approximately 60 to auscultation  Pulmonary:      Effort: Pulmonary effort is normal. No respiratory distress.      Breath sounds: Normal breath sounds. No wheezing.   Abdominal:      General: Bowel sounds are normal. There is no distension.      Palpations: Abdomen is soft.      Tenderness: There is no abdominal tenderness.   Musculoskeletal: Normal range of motion.      Right lower leg: Edema present.      Left lower leg: Edema present.      Comments: Compression stockings in place, +1 edema at ankles bilaterally   Lymphadenopathy:      Cervical: No cervical adenopathy.   Skin:     General: Skin is warm.      Findings: No rash.   Neurological:      General: No focal deficit present.      Mental Status: He is alert and oriented to person, place, and time. Mental status is at baseline.      Cranial Nerves: No cranial nerve deficit.      Motor: No weakness.      Coordination: Coordination normal.      Gait: Gait normal.   Psychiatric:         Mood and Affect: Mood normal.         Behavior: Behavior normal.         Thought Content: Thought  content normal.         Judgment: Judgment normal.               Assessment/Plan   Medicare Risks and Personalized Health Plan  CMS Preventative Services Quick Reference  Advance Directive Discussion  Cardiovascular risk  Diabetic Lab Screening   Hearing Problem  Inactivity/Sedentary  Obesity/Overweight   Prostate Cancer Screening     The above risks/problems have been discussed with the patient.  Pertinent information has been shared with the patient in the After Visit Summary.  Follow up plans and orders are seen below in the Assessment/Plan Section.    Diagnoses and all orders for this visit:    1. Encounter for Medicare annual wellness exam (Primary)  -     CBC & Differential  -     Comprehensive Metabolic Panel  -     Lipid Panel With / Chol / HDL Ratio  -     PSA Screen  Given information on living will, request copy when completed  Encourage patient to sign up for Covid vaccine    2. Essential hypertension  -     CBC & Differential  -     Comprehensive Metabolic Panel  Follow heart healthy/low salt diet  Avoid processed foods  Monitor blood pressure as discussed  Exercise as tolerated up to 30 minutes 5 days per week  Take all medications as prescribed    3. Mixed hyperlipidemia  -     Comprehensive Metabolic Panel  -     Lipid Panel With / Chol / HDL Ratio  Follow low-fat/low-cholesterol diet  Continue atorvastatin    4. Gastroesophageal reflux disease without esophagitis  Continue omeprazole, GERD is well controlled    5. Screening PSA (prostate specific antigen)  -     PSA Screen    6. Generalized anxiety disorder  Continue Xanax as needed for underlying anxiety disorder    7. Class 1 obesity due to excess calories with serious comorbidity and body mass index (BMI) of 31.0 to 31.9 in adult  Patient's Body mass index is 31.24 kg/m². BMI is above normal parameters. Recommendations include: exercise counseling and nutrition counseling.    Other orders  -     amLODIPine (NORVASC) 10 MG tablet; Take 1 tablet by  mouth Daily.  Dispense: 90 tablet; Refill: 3  -     atorvastatin (Lipitor) 40 MG tablet; Take 1 tablet by mouth Daily.  Dispense: 90 tablet; Refill: 1  -     carvedilol (COREG) 6.25 MG tablet; Take 1 tablet by mouth 2 (Two) Times a Day.  Dispense: 180 tablet; Refill: 1  -     doxazosin (CARDURA) 2 MG tablet; Take 1 tablet by mouth every night at bedtime.  Dispense: 90 tablet; Refill: 1  -     isosorbide mononitrate (IMDUR) 60 MG 24 hr tablet; Take 1 tablet by mouth Daily.  Dispense: 90 tablet; Refill: 3  -     lisinopril (PRINIVIL,ZESTRIL) 20 MG tablet; Take 1 tablet by mouth Daily.  Dispense: 90 tablet; Refill: 3  -     triamcinolone (KENALOG) 0.025 % cream; Apply  topically to the appropriate area as directed 2 (Two) Times a Day. To rash  Dispense: 45 g; Refill: 0      Follow Up:  Return in about 6 months (around 8/8/2021) for Next scheduled follow up.     An After Visit Summary and PPPS were given to the patient.

## 2021-02-09 LAB
ALBUMIN SERPL-MCNC: 4 G/DL (ref 3.5–5.2)
ALBUMIN/GLOB SERPL: 1.7 G/DL
ALP SERPL-CCNC: 68 U/L (ref 39–117)
ALT SERPL-CCNC: 24 U/L (ref 1–41)
AST SERPL-CCNC: 22 U/L (ref 1–40)
BASOPHILS # BLD AUTO: 0.07 10*3/MM3 (ref 0–0.2)
BASOPHILS NFR BLD AUTO: 1 % (ref 0–1.5)
BILIRUB SERPL-MCNC: 0.5 MG/DL (ref 0–1.2)
BUN SERPL-MCNC: 15 MG/DL (ref 8–23)
BUN/CREAT SERPL: 15.5 (ref 7–25)
CALCIUM SERPL-MCNC: 9.2 MG/DL (ref 8.6–10.5)
CHLORIDE SERPL-SCNC: 105 MMOL/L (ref 98–107)
CHOLEST SERPL-MCNC: 144 MG/DL (ref 0–200)
CHOLEST/HDLC SERPL: 3.43 {RATIO}
CO2 SERPL-SCNC: 29.5 MMOL/L (ref 22–29)
CREAT SERPL-MCNC: 0.97 MG/DL (ref 0.76–1.27)
EOSINOPHIL # BLD AUTO: 0.17 10*3/MM3 (ref 0–0.4)
EOSINOPHIL NFR BLD AUTO: 2.5 % (ref 0.3–6.2)
ERYTHROCYTE [DISTWIDTH] IN BLOOD BY AUTOMATED COUNT: 12.5 % (ref 12.3–15.4)
GLOBULIN SER CALC-MCNC: 2.4 GM/DL
GLUCOSE SERPL-MCNC: 98 MG/DL (ref 65–99)
HCT VFR BLD AUTO: 45.8 % (ref 37.5–51)
HDLC SERPL-MCNC: 42 MG/DL (ref 40–60)
HGB BLD-MCNC: 15.4 G/DL (ref 13–17.7)
IMM GRANULOCYTES # BLD AUTO: 0.02 10*3/MM3 (ref 0–0.05)
IMM GRANULOCYTES NFR BLD AUTO: 0.3 % (ref 0–0.5)
LDLC SERPL CALC-MCNC: 80 MG/DL (ref 0–100)
LYMPHOCYTES # BLD AUTO: 1.61 10*3/MM3 (ref 0.7–3.1)
LYMPHOCYTES NFR BLD AUTO: 23.9 % (ref 19.6–45.3)
MCH RBC QN AUTO: 31 PG (ref 26.6–33)
MCHC RBC AUTO-ENTMCNC: 33.6 G/DL (ref 31.5–35.7)
MCV RBC AUTO: 92.2 FL (ref 79–97)
MONOCYTES # BLD AUTO: 0.66 10*3/MM3 (ref 0.1–0.9)
MONOCYTES NFR BLD AUTO: 9.8 % (ref 5–12)
NEUTROPHILS # BLD AUTO: 4.22 10*3/MM3 (ref 1.7–7)
NEUTROPHILS NFR BLD AUTO: 62.5 % (ref 42.7–76)
NRBC BLD AUTO-RTO: 0 /100 WBC (ref 0–0.2)
PLATELET # BLD AUTO: 156 10*3/MM3 (ref 140–450)
POTASSIUM SERPL-SCNC: 4.5 MMOL/L (ref 3.5–5.2)
PROT SERPL-MCNC: 6.4 G/DL (ref 6–8.5)
PSA SERPL-MCNC: 1.26 NG/ML (ref 0–4)
RBC # BLD AUTO: 4.97 10*6/MM3 (ref 4.14–5.8)
SODIUM SERPL-SCNC: 140 MMOL/L (ref 136–145)
TRIGL SERPL-MCNC: 123 MG/DL (ref 0–150)
VLDLC SERPL CALC-MCNC: 22 MG/DL (ref 5–40)
WBC # BLD AUTO: 6.75 10*3/MM3 (ref 3.4–10.8)

## 2021-03-12 ENCOUNTER — OFFICE VISIT (OUTPATIENT)
Dept: INTERNAL MEDICINE | Facility: CLINIC | Age: 76
End: 2021-03-12

## 2021-03-12 VITALS
HEIGHT: 71 IN | OXYGEN SATURATION: 98 % | BODY MASS INDEX: 31.75 KG/M2 | SYSTOLIC BLOOD PRESSURE: 130 MMHG | DIASTOLIC BLOOD PRESSURE: 84 MMHG | HEART RATE: 60 BPM | TEMPERATURE: 97.5 F | WEIGHT: 226.8 LBS

## 2021-03-12 DIAGNOSIS — H72.91 PERFORATION OF RIGHT TYMPANIC MEMBRANE: ICD-10-CM

## 2021-03-12 DIAGNOSIS — R42 VERTIGO: ICD-10-CM

## 2021-03-12 DIAGNOSIS — H65.22 LEFT CHRONIC SEROUS OTITIS MEDIA: Primary | ICD-10-CM

## 2021-03-12 PROCEDURE — 99213 OFFICE O/P EST LOW 20 MIN: CPT | Performed by: NURSE PRACTITIONER

## 2021-03-12 RX ORDER — MECLIZINE HYDROCHLORIDE 25 MG/1
25 TABLET ORAL 3 TIMES DAILY PRN
Qty: 30 TABLET | Refills: 0 | Status: SHIPPED | OUTPATIENT
Start: 2021-03-12 | End: 2021-12-21

## 2021-03-12 RX ORDER — AZELASTINE HCL 205.5 UG/1
SPRAY NASAL
COMMUNITY
End: 2021-12-21

## 2021-03-12 NOTE — PROGRESS NOTES
"  Office Visit      Patient Name: Tristan Hillman  : 1945   MRN: [id number]   Care Team: Patient Care Team:  Vermeesch, Marilyn K, MD as PCP - General (Internal Medicine & Pediatrics)    Chief Complaint  Headache (States he has been having issues on and off for the past several months. Complains mostly of frontal head pain.) and Dizziness (Has been having issues for several months. He states that he has spinning of his environment when he turns his head from side to side.)    Subjective     Subjective      Tristan Hillman presents to [facility] for frontal headaches and dizziness. Symptoms started 3 months ago and started worsening in the last 2 months. Headaches come on in the evening and describes them as a dull pain just enough to aggravate him. They are bilateral and not associated with nausea, vomiting, light sensitivity, or sound.  He has tried tylenol and that does help. Admits to chronic neck pain and dizziness as well. Feels lightheaded but when he moves his head quickly the room feels like its spinning. If he focuses on a spot on the wall the dizziness subsides. Follows with cardiology as he does have coronary artery disease, just recently had an EKG that was normal.    Denies chest pain, weakness, new leg swelling, shortness of breath, confusion, sore throat, otalgia, and fever.  Admits to nasal congestion and rhinorrhea.  No history of vertigo.     Objective     Objective   Vital Signs:   /84   Pulse 60   Temp 97.5 °F (36.4 °C)   Ht 180.3 cm (71\")   Wt 103 kg (226 lb 12.8 oz)   SpO2 98%   BMI 31.63 kg/m²     Physical Exam  Vitals and nursing note reviewed.   Constitutional:       General: He is not in acute distress.     Appearance: Normal appearance.   HENT:      Right Ear: Decreased hearing noted. Tympanic membrane is perforated.      Left Ear: A middle ear effusion is present.      Ears:      Comments: Hearing aid in right ear     Nose: Congestion present. "   Eyes:      Extraocular Movements: Extraocular movements intact.      Pupils: Pupils are equal, round, and reactive to light.   Neck:      Vascular: No carotid bruit.   Cardiovascular:      Rate and Rhythm: Normal rate and regular rhythm.      Pulses: Normal pulses.      Heart sounds: Normal heart sounds. No murmur.   Pulmonary:      Effort: Pulmonary effort is normal.      Breath sounds: Normal breath sounds. No wheezing.   Abdominal:      General: Bowel sounds are normal. There is no distension.      Palpations: Abdomen is soft.      Tenderness: There is no abdominal tenderness.   Musculoskeletal:      Cervical back: Neck supple. No tenderness.   Skin:     General: Skin is warm and dry.   Neurological:      General: No focal deficit present.      Mental Status: He is alert.      Motor: No weakness.      Gait: Gait normal.      Deep Tendon Reflexes: Reflexes normal.      Comments: + john hallpike manuever   Psychiatric:         Mood and Affect: Mood normal.         Behavior: Behavior normal.          Assessment / Plan         Assessment  Problem List Items Addressed This Visit     None      Visit Diagnoses     Left chronic serous otitis media    -  Primary    Vertigo        Perforation of right tympanic membrane            Plan  - Symptoms consistent with BPPV, meclizine PRN for dizziness. Flonase prescribed, correct technique discussed in office today. We discussed epley maneuvers and information given to him in his AVS, he does not wish to participate in PT at this time.   - I encouraged him to move slowly from sitting to standing and stay hydrated with clear decaffeinated fluids.   - RTC if symptoms worsen or fail to improve.     Follow Up   Return if symptoms worsen or fail to improve.  Patient was given instructions and counseling regarding his condition or for health maintenance advice. Please see specific information pulled into the AVS if appropriate.     LOUISE Mooney  [facility]  [facility] [place]

## 2021-03-16 DIAGNOSIS — F41.1 GENERALIZED ANXIETY DISORDER: ICD-10-CM

## 2021-03-16 RX ORDER — ALPRAZOLAM 0.25 MG/1
0.25 TABLET ORAL DAILY
Qty: 30 TABLET | Refills: 2 | Status: SHIPPED | OUTPATIENT
Start: 2021-03-16 | End: 2021-06-28

## 2021-03-16 NOTE — TELEPHONE ENCOUNTER
Last filled: 12/14/2020  Days worth: 30  Refills: 2    Last appointment: 3/12/21  Next appointment: no f/u on file, next appointment not due till August.     Labs: UTD

## 2021-06-28 DIAGNOSIS — F41.1 GENERALIZED ANXIETY DISORDER: ICD-10-CM

## 2021-06-28 RX ORDER — ALPRAZOLAM 0.25 MG/1
0.25 TABLET ORAL DAILY
Qty: 30 TABLET | Refills: 0 | Status: SHIPPED | OUTPATIENT
Start: 2021-06-28 | End: 2021-07-23 | Stop reason: SDUPTHER

## 2021-06-28 NOTE — TELEPHONE ENCOUNTER
Last filled: 3/16/21  Days worth: 30  Refills: 2    Last appointment: 3/12/21  Next appointment: no f/u on file, not due til Aug    Labs: FABY

## 2021-07-06 ENCOUNTER — TELEPHONE (OUTPATIENT)
Dept: PHARMACY | Facility: HOSPITAL | Age: 76
End: 2021-07-06

## 2021-07-06 NOTE — TELEPHONE ENCOUNTER
Medication Adherence Call    Patient was called today to discuss medication adherence with atorvastatin, as the patient was identified as having care opportunities.     The patient did not answer. I will try again at a later date.    Lelo Castle PharmD  [facility] Pharmacist  07/06/21      Medication Adherence Call    Patient was called again today to discuss medication adherence with atorvastatin, as the patient was identified as having care opportunities.     The patient did not answer for the second time. I will try once more at a later date.    Lelo Castle PharmD  [facility] Pharmacist  07/07/21    Medication Adherence Call    Patient was called for a third time today to discuss medication adherence, as the patient was identified as having care opportunities.     The patient did not answer. There is nothing more I can do at this time.    Lelo Castle PharmD  [facility] Pharmacist  07/08/21

## 2021-07-23 ENCOUNTER — OFFICE VISIT (OUTPATIENT)
Dept: INTERNAL MEDICINE | Facility: CLINIC | Age: 76
End: 2021-07-23

## 2021-07-23 VITALS
DIASTOLIC BLOOD PRESSURE: 70 MMHG | HEIGHT: 71 IN | WEIGHT: 233 LBS | SYSTOLIC BLOOD PRESSURE: 136 MMHG | BODY MASS INDEX: 32.62 KG/M2 | OXYGEN SATURATION: 97 % | HEART RATE: 70 BPM | TEMPERATURE: 97.1 F

## 2021-07-23 DIAGNOSIS — M77.8 LEFT ELBOW TENDINITIS: ICD-10-CM

## 2021-07-23 DIAGNOSIS — M75.22 BICEPS TENDINITIS OF LEFT SHOULDER: Primary | ICD-10-CM

## 2021-07-23 DIAGNOSIS — F41.1 GENERALIZED ANXIETY DISORDER: ICD-10-CM

## 2021-07-23 PROCEDURE — 99213 OFFICE O/P EST LOW 20 MIN: CPT | Performed by: INTERNAL MEDICINE

## 2021-07-23 RX ORDER — CARVEDILOL 6.25 MG/1
6.25 TABLET ORAL 2 TIMES DAILY
Qty: 180 TABLET | Refills: 1 | Status: SHIPPED | OUTPATIENT
Start: 2021-07-23 | End: 2022-12-12

## 2021-07-23 RX ORDER — LIDOCAINE 50 MG/G
1 PATCH TOPICAL EVERY 24 HOURS
Qty: 30 PATCH | Refills: 1 | Status: SHIPPED | OUTPATIENT
Start: 2021-07-23 | End: 2021-12-21

## 2021-07-23 RX ORDER — PREDNISONE 20 MG/1
20 TABLET ORAL DAILY
Qty: 7 TABLET | Refills: 0 | Status: SHIPPED | OUTPATIENT
Start: 2021-07-23 | End: 2021-08-30

## 2021-07-23 RX ORDER — ATORVASTATIN CALCIUM 40 MG/1
40 TABLET, FILM COATED ORAL DAILY
Qty: 90 TABLET | Refills: 1 | Status: SHIPPED | OUTPATIENT
Start: 2021-07-23 | End: 2021-12-21

## 2021-07-23 RX ORDER — DOXAZOSIN 2 MG/1
2 TABLET ORAL
Qty: 90 TABLET | Refills: 1 | Status: SHIPPED | OUTPATIENT
Start: 2021-07-23 | End: 2021-12-21

## 2021-07-23 RX ORDER — ALPRAZOLAM 0.25 MG/1
0.25 TABLET ORAL DAILY
Qty: 30 TABLET | Refills: 2 | Status: SHIPPED | OUTPATIENT
Start: 2021-07-23 | End: 2021-11-23 | Stop reason: SDUPTHER

## 2021-07-23 NOTE — PROGRESS NOTES
"Chief Complaint   Patient presents with   • Abstract     Pt states he's having left shoulder pain that radiates into his arm. Also has some left hand pain off and on.     Subjective   Tristan Hillman is a 76 y.o. male.     Patient is here today with complaints of left shoulder and arm pain with radiation to hand.  He has had left shoulder pain for many yrs.   He has been working more in his yard recently weed whacking and spraying for weeds, this has worsened pain over past week.  He also works as an  and is left-handed, therefore uses wrenches and is constantly twisting and turning with his left hand.  He has taken some tylenol 500 mg once a day and tried salon pas patch and it does not stick to his skin.    He used a patch from his sister in law, she  a few months ago, put that on his shoulder and that helped a lot.  He says salon pas patches do not stick to him but the lidocaine patch does.   He is also complaining that he has weakness in his legs.  He is using his creeper board to get back and forth under cars as a .  He has to get down on hand and knees and grab a cold of car handle to pull himself up to standing position due to weakness in legs.  He does not do any other physical exercise to help with strengthening of legs.       The following portions of the patient's history were reviewed and updated as appropriate: allergies, current medications, past family history, past medical history, past social history, past surgical history and problem list.    Review of Systems   Musculoskeletal: Positive for arthralgias.        Left shoulder and elbow pain with radiation to hand on occasion   Neurological: Positive for weakness. Negative for numbness.       Objective   /70   Pulse 70   Temp 97.1 °F (36.2 °C)   Ht 180.3 cm (71\")   Wt 106 kg (233 lb)   SpO2 97%   BMI 32.50 kg/m²   Body mass index is 32.5 kg/m².  Physical Exam  Vitals and nursing note reviewed.   Constitutional:       " General: He is not in acute distress.     Appearance: Normal appearance. He is not ill-appearing.      Comments: Pleasant man, appears his age and in no distress   HENT:      Head: Normocephalic and atraumatic.      Right Ear: External ear normal.      Left Ear: External ear normal.      Ears:      Comments: Very hard of hearing even with hearing aids in place  Pulmonary:      Effort: Pulmonary effort is normal. No respiratory distress.   Musculoskeletal:         General: Tenderness present.      Comments: Tenderness over anterior biceps tendon to palpation, tender over medial and lateral epicondyles, pain with pronation and suppination of left forearm   Neurological:      General: No focal deficit present.      Mental Status: He is alert and oriented to person, place, and time. Mental status is at baseline.      Cranial Nerves: No cranial nerve deficit.      Motor: No weakness.      Gait: Gait normal.   Psychiatric:         Mood and Affect: Mood normal.         Behavior: Behavior normal.         Thought Content: Thought content normal.         Judgment: Judgment normal.         Assessment/Plan   Tristan Hillman is here today and the following problems have been addressed:      Diagnoses and all orders for this visit:    1. Biceps tendinitis of left shoulder (Primary)    2. Left elbow tendinitis    Other orders  -     atorvastatin (Lipitor) 40 MG tablet; Take 1 tablet by mouth Daily.  Dispense: 90 tablet; Refill: 1  -     carvedilol (COREG) 6.25 MG tablet; Take 1 tablet by mouth 2 (Two) Times a Day.  Dispense: 180 tablet; Refill: 1  -     doxazosin (CARDURA) 2 MG tablet; Take 1 tablet by mouth every night at bedtime.  Dispense: 90 tablet; Refill: 1  -     predniSONE (DELTASONE) 20 MG tablet; Take 1 tablet by mouth Daily.  Dispense: 7 tablet; Refill: 0  -     lidocaine (LIDODERM) 5 %; Place 1 patch on the skin as directed by provider Daily. Remove & Discard patch within 12 hours or as directed by MD  Dispense: 30 patch;  Refill: 1    Recommend Tylenol extra strength 2 tablets a.m. and p.m.  Recommend elbow sleeve on in a.m. and off in p.m.  Recommend heat to shoulder and elbow for 20 minutes twice daily  Given prednisone 20 mg to take once daily for 7 days  Provided with lidocaine patch to apply to shoulder once daily, remove after 12 hours    Return to clinic in 1 month for routine follow-up visit of all medical problems    Please note that portions of this note were completed with a voice recognition program.  Efforts were made to edit dictation, but occasionally words are mistranscribed.

## 2021-08-30 ENCOUNTER — OFFICE VISIT (OUTPATIENT)
Dept: INTERNAL MEDICINE | Facility: CLINIC | Age: 76
End: 2021-08-30

## 2021-08-30 VITALS
SYSTOLIC BLOOD PRESSURE: 134 MMHG | DIASTOLIC BLOOD PRESSURE: 80 MMHG | BODY MASS INDEX: 32.48 KG/M2 | HEIGHT: 71 IN | WEIGHT: 232 LBS | TEMPERATURE: 97 F | HEART RATE: 66 BPM | OXYGEN SATURATION: 97 %

## 2021-08-30 DIAGNOSIS — G60.9 IDIOPATHIC PERIPHERAL NEUROPATHY: ICD-10-CM

## 2021-08-30 DIAGNOSIS — M25.50 ARTHRALGIA OF MULTIPLE JOINTS: ICD-10-CM

## 2021-08-30 DIAGNOSIS — N40.1 BENIGN PROSTATIC HYPERPLASIA WITH NOCTURIA: ICD-10-CM

## 2021-08-30 DIAGNOSIS — K21.9 GASTROESOPHAGEAL REFLUX DISEASE WITHOUT ESOPHAGITIS: ICD-10-CM

## 2021-08-30 DIAGNOSIS — R35.1 BENIGN PROSTATIC HYPERPLASIA WITH NOCTURIA: ICD-10-CM

## 2021-08-30 DIAGNOSIS — F41.1 GENERALIZED ANXIETY DISORDER: ICD-10-CM

## 2021-08-30 DIAGNOSIS — E78.2 MIXED HYPERLIPIDEMIA: ICD-10-CM

## 2021-08-30 DIAGNOSIS — I10 ESSENTIAL HYPERTENSION: Primary | ICD-10-CM

## 2021-08-30 DIAGNOSIS — G47.00 INSOMNIA, UNSPECIFIED TYPE: ICD-10-CM

## 2021-08-30 PROCEDURE — 99214 OFFICE O/P EST MOD 30 MIN: CPT | Performed by: INTERNAL MEDICINE

## 2021-08-30 NOTE — PROGRESS NOTES
Chief Complaint   Patient presents with   • Follow-up     for GERD, anxiety, HLD, and HTN.      Subjective   Tristan Hillman is a 76 y.o. male.     Here today for follow up of HTN, HLD, anxiety, GERD, PN.   HTN/HLDCAD-  His BP is well controlled today.  He denies CP, palpitations, some edema. He has not been doing any walking or exercise.  He awakens with chest tightness in middle of the night.  His BP is normal, but he has to take xanax and then falls back to sleep.    Anxiety- he continues to take xanax every PM.  His anxiety is doing well.  He does not sleep well, never has.   GERD- he denies any GERD sxs, takes omeprazole daily  PN- has some burning and numbness in feet intermittently at night.    BPH -he is awakening 2-3 times a night.  No dribbling or hesitancy, but he has a weak stream.  PSA was normal in Feb 2021  Insomnia -  States that he sleeps fair with use of xanax.  He is no longer using melatonin  HCM-  Pneumovax and colonoscopy all up-to-date.  He has not had a hepatitis A or shingles vaccine. Declines Covid vaccine.  Patient was here 4 weeks ago with left elbow and shoulder pain. He was diagnosed with left shoulder and biceps tendinitis. He was given prednisone taper and told to use Tylenol. Also encouraged to use heat regularly.       The following portions of the patient's history were reviewed and updated as appropriate: allergies, current medications, past family history, past medical history, past social history, past surgical history and problem list.    Review of Systems   Constitutional: Negative for activity change, appetite change and unexpected weight change.   Eyes: Negative for visual disturbance.   Respiratory: Positive for chest tightness. Negative for shortness of breath.    Cardiovascular: Negative for chest pain, palpitations and leg swelling.   Gastrointestinal: Negative for abdominal pain.   Genitourinary: Negative for hematuria.   Musculoskeletal: Positive for arthralgias. Negative  "for back pain.   Neurological: Negative for headaches.   Psychiatric/Behavioral: Negative for dysphoric mood and sleep disturbance.       Objective   /80   Pulse 66   Temp 97 °F (36.1 °C)   Ht 180.3 cm (71\")   Wt 105 kg (232 lb)   SpO2 97%   BMI 32.36 kg/m²   Body mass index is 32.36 kg/m².  Physical Exam  Vitals and nursing note reviewed.   Constitutional:       General: He is not in acute distress.     Appearance: Normal appearance. He is well-developed. He is not ill-appearing.      Comments: Kind and pleasant man, appears stated age and in NAD today   HENT:      Head: Normocephalic and atraumatic.      Right Ear: External ear normal.      Left Ear: External ear normal.      Ears:      Comments: Hearing aids in place, hard of hearing  Eyes:      General:         Right eye: No discharge.         Left eye: No discharge.      Extraocular Movements: Extraocular movements intact.      Conjunctiva/sclera: Conjunctivae normal.      Pupils: Pupils are equal, round, and reactive to light.   Neck:      Thyroid: No thyromegaly.      Vascular: No carotid bruit.      Comments: No thyromegaly or mass  Cardiovascular:      Rate and Rhythm: Normal rate and regular rhythm.      Pulses: Normal pulses.      Heart sounds: Normal heart sounds. No murmur heard.     Pulmonary:      Effort: Pulmonary effort is normal. No respiratory distress.      Breath sounds: Normal breath sounds. No wheezing.   Abdominal:      General: Bowel sounds are normal. There is no distension.      Palpations: Abdomen is soft.      Tenderness: There is no abdominal tenderness.   Musculoskeletal:         General: Tenderness present.      Cervical back: Normal range of motion and neck supple.      Right lower leg: No edema.      Left lower leg: No edema.      Comments: Bilateral shoulder crepitus   Lymphadenopathy:      Cervical: No cervical adenopathy.   Skin:     General: Skin is warm.      Findings: No rash.   Neurological:      General: No focal " deficit present.      Mental Status: He is alert and oriented to person, place, and time. Mental status is at baseline.      Cranial Nerves: No cranial nerve deficit.      Motor: No weakness.      Coordination: Coordination normal.      Gait: Gait normal.   Psychiatric:         Mood and Affect: Mood normal.         Behavior: Behavior normal.         Thought Content: Thought content normal.         Judgment: Judgment normal.         Assessment/Plan   Tristan Hillman is here today and the following problems have been addressed:      Diagnoses and all orders for this visit:    1. Essential hypertension (Primary)    2. Mixed hyperlipidemia    3. Gastroesophageal reflux disease without esophagitis    4. Benign prostatic hyperplasia with nocturia    5. Idiopathic peripheral neuropathy    6. Insomnia, unspecified type    7. Generalized anxiety disorder    8. Arthralgia of multiple joints        Follow heart healthy/low salt/low cholesterol diet  Avoid processed foods  Monitor blood pressure on occasion  Exercise as tolerated   Take all medications as prescribed  He will consider referral to ortho, Dr Sotelo for severe left shoulder DJD sxs in future  Continue Cardura for BPH symptoms  GERD is well controlled with omeprazole  Recommend a trial of B complex vitamin for peripheral neuropathy  Continue Xanax 0.25 mg daily as needed for anxiety  Continue atorvastatin for hyperlipidemia  Continue Imdur and aspirin for underlying CAD, patient has been complaining of chest tightness at night and states he has a follow-up appointment with his cardiologist within 1 month-encouraged him to seek care at emergency room if needed for ongoing symptoms    Return in about 6 months (around 2/28/2022) for Medicare Wellness.      Marilyn K. Vermeesch, MD      Please note that portions of this note were completed with a voice recognition program.  Efforts were made to edit dictation, but occasionally words are mistranscribed.

## 2021-11-23 DIAGNOSIS — F41.1 GENERALIZED ANXIETY DISORDER: ICD-10-CM

## 2021-11-23 RX ORDER — ALPRAZOLAM 0.25 MG/1
0.25 TABLET ORAL DAILY
Qty: 30 TABLET | Refills: 0 | Status: SHIPPED | OUTPATIENT
Start: 2021-11-23 | End: 2021-12-21 | Stop reason: SDUPTHER

## 2021-11-23 NOTE — TELEPHONE ENCOUNTER
Caller: Rafy Tristan    Relationship: Self    Best call back number: [phone]    Requested Prescriptions:   Requested Prescriptions     Pending Prescriptions Disp Refills   • ALPRAZolam (XANAX) 0.25 MG tablet 30 tablet 2     Sig: Take 1 tablet by mouth Daily.        Pharmacy where request should be sent: [id number] - [place] [address] AT [address] - [phone] [facility] [phone] [place]     Additional details provided by patient: PATIENT HAS ONE TABLET LEFT.    Does the patient have less than a 3 day supply:  [x] Yes  [] No    Renny Mosher Rep   11/23/21 09:33 EST

## 2021-12-21 ENCOUNTER — OFFICE VISIT (OUTPATIENT)
Dept: INTERNAL MEDICINE | Facility: CLINIC | Age: 76
End: 2021-12-21

## 2021-12-21 VITALS
HEART RATE: 73 BPM | SYSTOLIC BLOOD PRESSURE: 124 MMHG | DIASTOLIC BLOOD PRESSURE: 68 MMHG | WEIGHT: 235 LBS | HEIGHT: 71 IN | TEMPERATURE: 97.3 F | OXYGEN SATURATION: 98 % | BODY MASS INDEX: 32.9 KG/M2

## 2021-12-21 DIAGNOSIS — G44.52 NEW DAILY PERSISTENT HEADACHE: Primary | ICD-10-CM

## 2021-12-21 DIAGNOSIS — R42 DIZZINESS: ICD-10-CM

## 2021-12-21 DIAGNOSIS — I10 PRIMARY HYPERTENSION: ICD-10-CM

## 2021-12-21 DIAGNOSIS — F41.1 GENERALIZED ANXIETY DISORDER: ICD-10-CM

## 2021-12-21 PROCEDURE — G0008 ADMIN INFLUENZA VIRUS VAC: HCPCS | Performed by: INTERNAL MEDICINE

## 2021-12-21 PROCEDURE — 99214 OFFICE O/P EST MOD 30 MIN: CPT | Performed by: INTERNAL MEDICINE

## 2021-12-21 PROCEDURE — 90662 IIV NO PRSV INCREASED AG IM: CPT | Performed by: INTERNAL MEDICINE

## 2021-12-21 RX ORDER — ALPRAZOLAM 0.25 MG/1
0.25 TABLET ORAL DAILY
Qty: 30 TABLET | Refills: 0 | Status: SHIPPED | OUTPATIENT
Start: 2021-12-21 | End: 2022-03-22

## 2021-12-21 RX ORDER — ROSUVASTATIN CALCIUM 20 MG/1
20 TABLET, COATED ORAL DAILY
COMMUNITY
End: 2022-10-13 | Stop reason: SDUPTHER

## 2021-12-21 NOTE — PROGRESS NOTES
"Chief Complaint   Patient presents with   • Abstract     Pt states he's been having dizzy spells and headaches off and on X couple months     Subjective   Tristan Hillman is a 76 y.o. male.     Here today with complaints of headaches and dizziness on and off for past few months.  Patient with known history of hearing loss, currently wears hearing aids, this could be contributing somewhat to his dizziness.  Pt states he has a HA daily for past several months, worse over past month.  His HA is usually frontal.  It usually lasts a few hours, sometimes longer.  His HA does not awaken him from sleep.  No NV with HA.  He is not bothered by light, but does not like loud noises.  His dad had terrible HAs when he was young.  His sisters also has HAs.  He has dizziness with HAs, but also when he is not having HAs.  The dizziness resolves with HA, but can return without HA.  Tylenol relieves HA.  He often takes a half xanax to help with sleep, or in AM with tylenol for HA.  He has seen eye doctor and had new glasses but it has not helped.          The following portions of the patient's history were reviewed and updated as appropriate: allergies, current medications, past family history, past medical history, past social history, past surgical history and problem list.    Review of Systems   Constitutional: Negative for chills and fever.   HENT: Positive for hearing loss and sinus pressure. Negative for ear pain and sore throat.    Eyes: Negative for photophobia and visual disturbance.   Respiratory: Negative for cough.    Neurological: Positive for dizziness and headaches.       Objective   /68   Pulse 73   Temp 97.3 °F (36.3 °C)   Ht 180.3 cm (71\")   Wt 107 kg (235 lb)   SpO2 98%   BMI 32.78 kg/m²   Body mass index is 32.78 kg/m².  Physical Exam  Vitals and nursing note reviewed.   Constitutional:       General: He is not in acute distress.     Appearance: Normal appearance. He is well-developed. He is not " ill-appearing.      Comments: Kind and pleasant , appears stated age and in NAD today   HENT:      Head: Normocephalic and atraumatic.      Right Ear: Ear canal and external ear normal.      Left Ear: Ear canal and external ear normal.      Ears:      Comments: Hearing aids removed for exam.  Right TM with perforation and yellow bulging material noted from perforation.  Left TM with dullness and scant amount of wax in canal     Nose: No rhinorrhea.      Mouth/Throat:      Pharynx: No posterior oropharyngeal erythema.   Eyes:      General:         Right eye: No discharge.         Left eye: No discharge.      Extraocular Movements: Extraocular movements intact.      Conjunctiva/sclera: Conjunctivae normal.      Pupils: Pupils are equal, round, and reactive to light.   Neck:      Thyroid: No thyromegaly.      Vascular: No carotid bruit.      Comments: No thyromegaly or mass  Cardiovascular:      Rate and Rhythm: Normal rate and regular rhythm.      Pulses: Normal pulses.      Heart sounds: Normal heart sounds. No murmur heard.      Pulmonary:      Effort: Pulmonary effort is normal. No respiratory distress.      Breath sounds: Normal breath sounds. No wheezing.   Musculoskeletal:      Cervical back: Normal range of motion and neck supple.      Comments: Muscle strength 5 -/5 in upper extremities due to arthritis pain in shoulders, 5 -/5 in left lower extremity due to left hip pain and 5/5 in right lower extremity   Lymphadenopathy:      Cervical: No cervical adenopathy.   Skin:     General: Skin is warm.      Findings: No rash.   Neurological:      General: No focal deficit present.      Mental Status: He is alert and oriented to person, place, and time. Mental status is at baseline.      Cranial Nerves: No cranial nerve deficit.      Sensory: No sensory deficit.      Motor: No weakness.      Coordination: Coordination normal.      Gait: Gait normal.      Deep Tendon Reflexes: Reflexes normal.   Psychiatric:          Mood and Affect: Mood normal.         Behavior: Behavior normal.         Thought Content: Thought content normal.         Judgment: Judgment normal.         Assessment/Plan   Tristan Hillman is here today and the following problems have been addressed:      Diagnoses and all orders for this visit:    1. New daily persistent headache (Primary)  -     Sedimentation Rate  -     CBC & Differential  -     MRI Brain With & Without Contrast; Future  -     Basic Metabolic Panel    2. Generalized anxiety disorder  -     ALPRAZolam (XANAX) 0.25 MG tablet; Take 1 tablet by mouth Daily.  Dispense: 30 tablet; Refill: 0    3. Primary hypertension    4. 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  Efforts were made to edit dictation, but occasionally words are mistranscribed.

## 2021-12-22 LAB
BASOPHILS # BLD AUTO: 0.1 10*3/MM3 (ref 0–0.2)
BASOPHILS NFR BLD AUTO: 1.2 % (ref 0–1.5)
BUN SERPL-MCNC: 16 MG/DL (ref 8–23)
BUN/CREAT SERPL: 18.8 (ref 7–25)
CALCIUM SERPL-MCNC: 9.4 MG/DL (ref 8.6–10.5)
CHLORIDE SERPL-SCNC: 106 MMOL/L (ref 98–107)
CO2 SERPL-SCNC: 28.2 MMOL/L (ref 22–29)
CREAT SERPL-MCNC: 0.85 MG/DL (ref 0.76–1.27)
EOSINOPHIL # BLD AUTO: 0.26 10*3/MM3 (ref 0–0.4)
EOSINOPHIL NFR BLD AUTO: 3.2 % (ref 0.3–6.2)
ERYTHROCYTE [DISTWIDTH] IN BLOOD BY AUTOMATED COUNT: 11.9 % (ref 12.3–15.4)
ERYTHROCYTE [SEDIMENTATION RATE] IN BLOOD BY WESTERGREN METHOD: 6 MM/HR (ref 0–20)
GLUCOSE SERPL-MCNC: 90 MG/DL (ref 65–99)
HCT VFR BLD AUTO: 43.8 % (ref 37.5–51)
HGB BLD-MCNC: 15.1 G/DL (ref 13–17.7)
IMM GRANULOCYTES # BLD AUTO: 0.03 10*3/MM3 (ref 0–0.05)
IMM GRANULOCYTES NFR BLD AUTO: 0.4 % (ref 0–0.5)
LYMPHOCYTES # BLD AUTO: 2.28 10*3/MM3 (ref 0.7–3.1)
LYMPHOCYTES NFR BLD AUTO: 27.9 % (ref 19.6–45.3)
MCH RBC QN AUTO: 31.4 PG (ref 26.6–33)
MCHC RBC AUTO-ENTMCNC: 34.5 G/DL (ref 31.5–35.7)
MCV RBC AUTO: 91.1 FL (ref 79–97)
MONOCYTES # BLD AUTO: 0.87 10*3/MM3 (ref 0.1–0.9)
MONOCYTES NFR BLD AUTO: 10.7 % (ref 5–12)
NEUTROPHILS # BLD AUTO: 4.62 10*3/MM3 (ref 1.7–7)
NEUTROPHILS NFR BLD AUTO: 56.6 % (ref 42.7–76)
NRBC BLD AUTO-RTO: 0 /100 WBC (ref 0–0.2)
PLATELET # BLD AUTO: 156 10*3/MM3 (ref 140–450)
POTASSIUM SERPL-SCNC: 4.1 MMOL/L (ref 3.5–5.2)
RBC # BLD AUTO: 4.81 10*6/MM3 (ref 4.14–5.8)
SODIUM SERPL-SCNC: 142 MMOL/L (ref 136–145)
WBC # BLD AUTO: 8.16 10*3/MM3 (ref 3.4–10.8)

## 2021-12-22 NOTE — PROGRESS NOTES
Please tell patient that all labs checked yesterday are in normal range including his inflammatory marker to check for chronic disease.  We will await MRI results and I will contact him after review.

## 2022-01-18 ENCOUNTER — HOSPITAL ENCOUNTER (OUTPATIENT)
Dept: MRI IMAGING | Facility: HOSPITAL | Age: 77
Discharge: HOME | End: 2022-01-18
Admitting: INTERNAL MEDICINE

## 2022-01-18 DIAGNOSIS — R42 DIZZINESS: ICD-10-CM

## 2022-01-18 DIAGNOSIS — G44.52 NEW DAILY PERSISTENT HEADACHE: ICD-10-CM

## 2022-01-18 PROCEDURE — 70551 MRI BRAIN STEM W/O DYE: CPT

## 2022-01-18 NOTE — PROGRESS NOTES
Please tell patient MRI of brain is normal other than chronic calcium changes noted in blood vessels due to aging.  He has a small amount of fluid noted in sinus behind ear but this does not account for headaches.  His labs were also normal.  I would like him to try Zyrtec 10 mg every night before bed to see if this is helpful for his headaches, please tell him to take this about 1 hour before sleep.

## 2022-01-20 RX ORDER — OMEPRAZOLE 20 MG/1
CAPSULE, DELAYED RELEASE ORAL
Qty: 90 CAPSULE | Refills: 3 | Status: SHIPPED | OUTPATIENT
Start: 2022-01-20 | End: 2023-01-26 | Stop reason: SDUPTHER

## 2022-02-23 DIAGNOSIS — I10 ESSENTIAL HYPERTENSION: Primary | ICD-10-CM

## 2022-02-23 RX ORDER — AMLODIPINE BESYLATE 10 MG/1
10 TABLET ORAL DAILY
Qty: 90 TABLET | Refills: 1 | Status: SHIPPED | OUTPATIENT
Start: 2022-02-23 | End: 2022-03-01

## 2022-02-23 NOTE — TELEPHONE ENCOUNTER
Rx Refill Note  Requested Prescriptions     Pending Prescriptions Disp Refills   • amLODIPine (NORVASC) 10 MG tablet [Pharmacy Med Name: AMLODIPINE BESYLATE 10MG TABLETS] 90 tablet 3     Sig: TAKE 1 TABLET BY MOUTH DAILY      Last office visit with prescribing clinician: 12/21/2021      Next office visit with prescribing clinician: 3/1/2022            DEEPIKA BOWDEN MA  02/23/22, 14:42 EST

## 2022-03-01 ENCOUNTER — OFFICE VISIT (OUTPATIENT)
Dept: INTERNAL MEDICINE | Facility: CLINIC | Age: 77
End: 2022-03-01

## 2022-03-01 VITALS
TEMPERATURE: 97.1 F | WEIGHT: 233 LBS | BODY MASS INDEX: 32.62 KG/M2 | SYSTOLIC BLOOD PRESSURE: 128 MMHG | DIASTOLIC BLOOD PRESSURE: 76 MMHG | HEIGHT: 71 IN | OXYGEN SATURATION: 97 % | HEART RATE: 54 BPM

## 2022-03-01 DIAGNOSIS — K21.9 GASTROESOPHAGEAL REFLUX DISEASE WITHOUT ESOPHAGITIS: ICD-10-CM

## 2022-03-01 DIAGNOSIS — Z11.59 NEED FOR HEPATITIS C SCREENING TEST: ICD-10-CM

## 2022-03-01 DIAGNOSIS — G44.229 CHRONIC TENSION-TYPE HEADACHE, NOT INTRACTABLE: ICD-10-CM

## 2022-03-01 DIAGNOSIS — E78.2 MIXED HYPERLIPIDEMIA: ICD-10-CM

## 2022-03-01 DIAGNOSIS — F41.1 GENERALIZED ANXIETY DISORDER: ICD-10-CM

## 2022-03-01 DIAGNOSIS — G89.29 CHRONIC LEFT-SIDED LOW BACK PAIN WITHOUT SCIATICA: ICD-10-CM

## 2022-03-01 DIAGNOSIS — I10 PRIMARY HYPERTENSION: Primary | ICD-10-CM

## 2022-03-01 DIAGNOSIS — M54.50 CHRONIC LEFT-SIDED LOW BACK PAIN WITHOUT SCIATICA: ICD-10-CM

## 2022-03-01 DIAGNOSIS — Z00.00 ENCOUNTER FOR MEDICARE ANNUAL WELLNESS EXAM: ICD-10-CM

## 2022-03-01 PROBLEM — M77.8 LEFT ELBOW TENDINITIS: Status: RESOLVED | Noted: 2021-07-23 | Resolved: 2022-03-01

## 2022-03-01 PROBLEM — G44.52 NEW DAILY PERSISTENT HEADACHE: Status: RESOLVED | Noted: 2021-12-21 | Resolved: 2022-03-01

## 2022-03-01 PROCEDURE — 1170F FXNL STATUS ASSESSED: CPT | Performed by: INTERNAL MEDICINE

## 2022-03-01 PROCEDURE — 96160 PT-FOCUSED HLTH RISK ASSMT: CPT | Performed by: INTERNAL MEDICINE

## 2022-03-01 PROCEDURE — 1125F AMNT PAIN NOTED PAIN PRSNT: CPT | Performed by: INTERNAL MEDICINE

## 2022-03-01 PROCEDURE — G0439 PPPS, SUBSEQ VISIT: HCPCS | Performed by: INTERNAL MEDICINE

## 2022-03-01 PROCEDURE — 1159F MED LIST DOCD IN RCRD: CPT | Performed by: INTERNAL MEDICINE

## 2022-03-01 PROCEDURE — 99397 PER PM REEVAL EST PAT 65+ YR: CPT | Performed by: INTERNAL MEDICINE

## 2022-03-01 RX ORDER — HYDROCHLOROTHIAZIDE 12.5 MG/1
12.5 TABLET ORAL DAILY
Qty: 30 TABLET | Refills: 1 | Status: SHIPPED | OUTPATIENT
Start: 2022-03-01 | End: 2022-04-01 | Stop reason: SDUPTHER

## 2022-03-01 RX ORDER — LISINOPRIL 20 MG/1
20 TABLET ORAL 2 TIMES DAILY
Qty: 180 TABLET | Refills: 1 | Status: SHIPPED | OUTPATIENT
Start: 2022-03-01 | End: 2022-11-07 | Stop reason: SDUPTHER

## 2022-03-01 NOTE — PROGRESS NOTES
The ABCs of the Annual Wellness Visit  Subsequent Medicare Wellness Visit    Chief Complaint   Patient presents with   • Medicare Wellness-subsequent     Pt states he's had left hip pain that radiates into leg X 3 days. Also states he's having headaches everydays.       Subjective    History of Present Illness:  Tristan Hillman is a 76 y.o. male who presents for a Subsequent Medicare Wellness Visit.  PMH of HTN, HLD, CAD, GERD, BASSEM, DJD, PN, insomnia.  Due to chronic headache and dizziness he was recently seen by ENT and was told that he felt it could be caused by his medications.  Recent MRI of brain did not show anything abnormal.   GERD is doing well.  BASSEM is doing well with xanax but he does not sleep well.  He falls asleep but awakens in a few hours to urinate.  He urinates about 2-4 times a night.  He takes his crestor every night.  He has chronic left hip and low back pain.     The following portions of the patient's history were reviewed and   updated as appropriate: allergies, current medications, past family history, past medical history, past social history, past surgical history and problem list.    Compared to one year ago, the patient feels his physical   health is worse.    Compared to one year ago, the patient feels his mental   health is the same.    Recent Hospitalizations:  He was not admitted to the hospital during the last year.       Current Medical Providers:  Patient Care Team:  Vermeesch, Marilyn K, MD as PCP - General (Internal Medicine & Pediatrics)    Outpatient Medications Prior to Visit   Medication Sig Dispense Refill   • ALPRAZolam (XANAX) 0.25 MG tablet Take 1 tablet by mouth Daily. 30 tablet 0   • Ascorbic Acid (VITAMIN C PO) Take  by mouth.     • aspirin 81 MG tablet Take 1 tablet by mouth Daily. 30 tablet 0   • carvedilol (COREG) 6.25 MG tablet Take 1 tablet by mouth 2 (Two) Times a Day. 180 tablet 1   • Multiple Vitamins-Minerals (ZINC PO) Take  by mouth.     • nitroglycerin  (NITROSTAT) 0.4 MG SL tablet place 1 tablet under the tongue if needed every 5 minutes fo 25 tablet 5   • omeprazole (priLOSEC) 20 MG capsule TAKE 1 CAPSULE BY MOUTH EVERY DAY 90 capsule 3   • rosuvastatin (CRESTOR) 20 MG tablet Take 20 mg by mouth Daily.     • triamcinolone (KENALOG) 0.025 % cream Apply  topically to the appropriate area as directed 2 (Two) Times a Day. To rash 45 g 0   • VITAMIN D PO Take  by mouth.     • amLODIPine (NORVASC) 10 MG tablet TAKE 1 TABLET BY MOUTH DAILY 90 tablet 1   • lisinopril (PRINIVIL,ZESTRIL) 20 MG tablet Take 1 tablet by mouth Daily. 90 tablet 3     No facility-administered medications prior to visit.       No opioid medication identified on active medication list. I have reviewed chart for other potential  high risk medication/s and harmful drug interactions in the elderly.          Aspirin is on active medication list. Aspirin use is indicated based on review of current medical condition/s. Pros and cons of this therapy have been discussed today. Benefits of this medication outweigh potential harm.  Patient has been encouraged to continue taking this medication.  .      Patient Active Problem List   Diagnosis   • Arthralgia of multiple joints   • Generalized anxiety disorder   • Gastroesophageal reflux disease without esophagitis   • Hyperlipidemia   • Hypertension   • Insomnia   • Screening PSA (prostate specific antigen)   • Bradycardia   • Chronic left-sided low back pain without sciatica   • Idiopathic peripheral neuropathy   • Encounter for Medicare annual wellness exam   • Dizziness   • Benign prostatic hyperplasia with nocturia   • Coronary artery disease involving native coronary artery without angina pectoris   • Chronic tension-type headache, not intractable   • Need for hepatitis C screening test     Advance Care Planning  Advance Directive is not on file.  ACP discussion was held with the patient during this visit. Patient does not have an advance directive,  "information provided.    Review of Systems   Constitutional: Positive for fatigue.   HENT: Positive for hearing loss and tinnitus.    Eyes: Negative.    Respiratory: Negative.    Cardiovascular: Negative.    Gastrointestinal: Negative.    Endocrine: Negative.    Genitourinary: Positive for urgency.        Urinates 2-5 times a night   Musculoskeletal: Positive for arthralgias and back pain.   Skin: Negative.    Allergic/Immunologic: Positive for environmental allergies.   Neurological: Positive for dizziness.   Hematological: Negative.    Psychiatric/Behavioral: Positive for sleep disturbance. Negative for dysphoric mood. The patient is nervous/anxious.         Objective    Vitals:    03/01/22 1328   BP: 128/76   Pulse: 54   Temp: 97.1 °F (36.2 °C)   SpO2: 97%   Weight: 106 kg (233 lb)   Height: 180.3 cm (71\")   PainSc:   3     BMI Readings from Last 1 Encounters:   03/01/22 32.50 kg/m²   BMI is above normal parameters. Recommendations include: exercise counseling and nutrition counseling    Does the patient have evidence of cognitive impairment? No    Physical Exam  Vitals and nursing note reviewed.   Constitutional:       General: He is not in acute distress.     Appearance: Normal appearance. He is well-developed. He is obese. He is not ill-appearing.      Comments: Kind and pleasant man, appears stated age and in NAD today   HENT:      Head: Normocephalic and atraumatic.      Right Ear: Tympanic membrane, ear canal and external ear normal.      Left Ear: Tympanic membrane, ear canal and external ear normal.      Ears:      Comments: Hearing aids removed for exam, hard of hearing     Nose: No congestion.      Mouth/Throat:      Pharynx: No posterior oropharyngeal erythema.   Eyes:      General:         Right eye: No discharge.         Left eye: No discharge.      Extraocular Movements: Extraocular movements intact.      Conjunctiva/sclera: Conjunctivae normal.      Pupils: Pupils are equal, round, and reactive to " light.   Neck:      Thyroid: No thyromegaly.      Vascular: No carotid bruit.      Comments: No thyromegaly or mass  Cardiovascular:      Rate and Rhythm: Normal rate and regular rhythm.      Pulses: Normal pulses.      Heart sounds: Normal heart sounds. No murmur heard.      Pulmonary:      Effort: Pulmonary effort is normal. No respiratory distress.      Breath sounds: Normal breath sounds. No wheezing.   Abdominal:      General: Bowel sounds are normal. There is no distension.      Palpations: Abdomen is soft.      Tenderness: There is no abdominal tenderness.   Musculoskeletal:         General: Normal range of motion.      Cervical back: Normal range of motion and neck supple.      Right lower leg: Edema present.      Left lower leg: Edema present.      Comments: Back pain noted with changes in position  Compression stockings in place, +1 edema at ankles   Lymphadenopathy:      Cervical: No cervical adenopathy.   Skin:     General: Skin is warm.      Findings: No rash.   Neurological:      General: No focal deficit present.      Mental Status: He is alert and oriented to person, place, and time. Mental status is at baseline.      Cranial Nerves: No cranial nerve deficit.      Motor: No weakness.      Coordination: Coordination normal.      Gait: Gait normal.   Psychiatric:         Mood and Affect: Mood normal.         Behavior: Behavior normal.         Thought Content: Thought content normal.         Judgment: Judgment normal.                 HEALTH RISK ASSESSMENT    Smoking Status:  Social History     Tobacco Use   Smoking Status Never Smoker   Smokeless Tobacco Never Used     Alcohol Consumption:  Social History     Substance and Sexual Activity   Alcohol Use Yes     Fall Risk Screen:    STEADI Fall Risk Assessment was completed, and patient is at HIGH risk for falls. Assessment completed on:3/1/2022    Depression Screening:  PHQ-2/PHQ-9 Depression Screening 3/1/2022   Little interest or pleasure in doing  things 0   Feeling down, depressed, or hopeless 1   Trouble falling or staying asleep, or sleeping too much -   Feeling tired or having little energy -   Poor appetite or overeating -   Feeling bad about yourself - or that you are a failure or have let yourself or your family down -   Trouble concentrating on things, such as reading the newspaper or watching television -   Moving or speaking so slowly that other people could have noticed. Or the opposite - being so fidgety or restless that you have been moving around a lot more than usual -   Thoughts that you would be better off dead, or of hurting yourself in some way -   Total Score 1   If you checked off any problems, how difficult have these problems made it for you to do your work, take care of things at home, or get along with other people? -       Health Habits and Functional and Cognitive Screening:  Functional & Cognitive Status 3/1/2022   Do you have difficulty preparing food and eating? No   Do you have difficulty bathing yourself, getting dressed or grooming yourself? No   Do you have difficulty using the toilet? No   Do you have difficulty moving around from place to place? Yes   Do you have trouble with steps or getting out of a bed or a chair? Yes   Current Diet Well Balanced Diet   Dental Exam Not up to date   Eye Exam Up to date   Exercise (times per week) 0 times per week   Current Exercises Include No Regular Exercise   Current Exercise Activities Include -   Do you need help using the phone?  No   Are you deaf or do you have serious difficulty hearing?  Yes   Do you need help with transportation? No   Do you need help shopping? No   Do you need help preparing meals?  No   Do you need help with housework?  No   Do you need help with laundry? No   Do you need help taking your medications? No   Do you need help managing money? No   Do you ever drive or ride in a car without wearing a seat belt? No   Have you felt unusual stress, anger or loneliness  in the last month? Yes   Who do you live with? Spouse   If you need help, do you have trouble finding someone available to you? No   Have you been bothered in the last four weeks by sexual problems? No   Do you have difficulty concentrating, remembering or making decisions? Yes       Age-appropriate Screening Schedule:  Refer to the list below for future screening recommendations based on patient's age, sex and/or medical conditions. Orders for these recommended tests are listed in the plan section. The patient has been provided with a written plan.    Health Maintenance   Topic Date Due   • ZOSTER VACCINE (2 of 3) 08/20/2017   • LIPID PANEL  02/08/2022   • INFLUENZA VACCINE  Completed   • TDAP/TD VACCINES  Discontinued              Assessment/Plan   CMS Preventative Services Quick Reference  Risk Factors Identified During Encounter  Cardiovascular Disease  Hearing Problem  Immunizations Discussed/Encouraged (specific Immunizations; Shingrix  Obesity/Overweight   The above risks/problems have been discussed with the patient.  Follow up actions/plans if indicated are seen below in the Assessment/Plan Section.  Pertinent information has been shared with the patient in the After Visit Summary.    Diagnoses and all orders for this visit:    1. Primary hypertension (Primary)  -     CBC & Differential  -     Comprehensive Metabolic Panel    2. Mixed hyperlipidemia  -     Comprehensive Metabolic Panel  -     Lipid Panel With / Chol / HDL Ratio    3. Gastroesophageal reflux disease without esophagitis  -     CBC & Differential    4. Generalized anxiety disorder    5. Chronic left-sided low back pain without sciatica  -     XR hip w or wo pelvis 2-3 view left; Future    6. Chronic tension-type headache, not intractable    7. Need for hepatitis C screening test  -     Hepatitis C Antibody    8. Encounter for Medicare annual wellness exam  -     CBC & Differential  -     Comprehensive Metabolic Panel  -     Hepatitis C  Antibody  -     Lipid Panel With / Chol / HDL Ratio    Other orders  -     lisinopril (PRINIVIL,ZESTRIL) 20 MG tablet; Take 1 tablet by mouth 2 (Two) Times a Day.  Dispense: 180 tablet; Refill: 1  -     hydroCHLOROthiazide (HYDRODIURIL) 12.5 MG tablet; Take 1 tablet by mouth Daily.  Dispense: 30 tablet; Refill: 1    Discontinue amlodipine to determine if this is because of headache and dizziness  Increase lisinopril to 20 mg twice daily  Continue carvedilol 6.25 mg twice daily  Add hydrochlorothiazide 12.5 mg every morning  Follow heart healthy/low salt/low cholesterol diet  Avoid processed foods  Monitor blood pressure as discussed-bring readings to next office visit  If headaches are not improved with above blood pressure medication change, we will discontinue lisinopril on next office visit and change medication to losartan or Avapro to determine if headache and dizziness improve.  Plan is to change 1 medication at a time to determine if medication is because of headache and dizziness  Exercise as tolerated   Take all medications as prescribed  Continue omeprazole, GERD is well controlled  Continue Crestor for hyperlipidemia  Anxiety and sleep are doing fairly well with use of Xanax nightly  Consider shingles vaccine at local pharmacy  X-ray low back and left pelvis due to chronic low back pain  Continue Tylenol 2 tablets twice daily and topical pain patch to area as needed    Follow Up:   Return in about 4 weeks (around 3/29/2022) for Next scheduled follow up.     An After Visit Summary and PPPS were made available to the patient.

## 2022-03-04 ENCOUNTER — HOSPITAL ENCOUNTER (OUTPATIENT)
Dept: GENERAL RADIOLOGY | Facility: HOSPITAL | Age: 77
Discharge: HOME | End: 2022-03-04
Admitting: INTERNAL MEDICINE

## 2022-03-04 DIAGNOSIS — M54.50 CHRONIC LEFT-SIDED LOW BACK PAIN WITHOUT SCIATICA: ICD-10-CM

## 2022-03-04 DIAGNOSIS — G89.29 CHRONIC LEFT-SIDED LOW BACK PAIN WITHOUT SCIATICA: ICD-10-CM

## 2022-03-04 PROCEDURE — 73502 X-RAY EXAM HIP UNI 2-3 VIEWS: CPT

## 2022-03-04 NOTE — PROGRESS NOTES
Please tell patient that x-ray of his pelvis and hips reveal osteoarthritis changes.  Please ask him if he would like to try a once a day medication to help with arthritis.  He will need to take this with food every morning.  If so please send me prescription for Mobic 15 mg daily #30 with 5 refills.

## 2022-03-04 NOTE — TELEPHONE ENCOUNTER
Attempted contacting Pt, no answer.    HUB TO SHARE: X-ray of his pelvis and hips reveal osteoarthritis changes.  Please ask him if he would like to try a once a day medication to help with arthritis.  He will need to take this with food every morning.

## 2022-03-04 NOTE — TELEPHONE ENCOUNTER
----- Message from Marilyn K Vermeesch, MD sent at 3/4/2022  1:07 PM EST -----  Please tell patient that x-ray of his pelvis and hips reveal osteoarthritis changes.  Please ask him if he would like to try a once a day medication to help with arthritis.  He will need to take this with food every morning.  If so please send me pre  scription for Mobic 15 mg daily #30 with 5 refills.

## 2022-03-05 LAB
ALBUMIN SERPL-MCNC: 4.5 G/DL (ref 3.5–5.2)
ALBUMIN/GLOB SERPL: 1.8 G/DL
ALP SERPL-CCNC: 81 U/L (ref 39–117)
ALT SERPL-CCNC: 22 U/L (ref 1–41)
AST SERPL-CCNC: 21 U/L (ref 1–40)
BASOPHILS # BLD AUTO: 0.09 10*3/MM3 (ref 0–0.2)
BASOPHILS NFR BLD AUTO: 1.1 % (ref 0–1.5)
BILIRUB SERPL-MCNC: 0.4 MG/DL (ref 0–1.2)
BUN SERPL-MCNC: 14 MG/DL (ref 8–23)
BUN/CREAT SERPL: 12.5 (ref 7–25)
CALCIUM SERPL-MCNC: 9.8 MG/DL (ref 8.6–10.5)
CHLORIDE SERPL-SCNC: 103 MMOL/L (ref 98–107)
CHOLEST SERPL-MCNC: 150 MG/DL (ref 0–200)
CHOLEST/HDLC SERPL: 3.75 {RATIO}
CO2 SERPL-SCNC: 26.8 MMOL/L (ref 22–29)
CREAT SERPL-MCNC: 1.12 MG/DL (ref 0.76–1.27)
EGFR GENE MUT ANL BLD/T: 68.9 ML/MIN/1.73
EOSINOPHIL # BLD AUTO: 0.41 10*3/MM3 (ref 0–0.4)
EOSINOPHIL NFR BLD AUTO: 4.8 % (ref 0.3–6.2)
ERYTHROCYTE [DISTWIDTH] IN BLOOD BY AUTOMATED COUNT: 12.6 % (ref 12.3–15.4)
GLOBULIN SER CALC-MCNC: 2.5 GM/DL
GLUCOSE SERPL-MCNC: 99 MG/DL (ref 65–99)
HCT VFR BLD AUTO: 45.6 % (ref 37.5–51)
HCV AB S/CO SERPL IA: 0.1 S/CO RATIO (ref 0–0.9)
HDLC SERPL-MCNC: 40 MG/DL (ref 40–60)
HGB BLD-MCNC: 14.9 G/DL (ref 13–17.7)
IMM GRANULOCYTES # BLD AUTO: 0.03 10*3/MM3 (ref 0–0.05)
IMM GRANULOCYTES NFR BLD AUTO: 0.4 % (ref 0–0.5)
LDLC SERPL CALC-MCNC: 82 MG/DL (ref 0–100)
LYMPHOCYTES # BLD AUTO: 1.94 10*3/MM3 (ref 0.7–3.1)
LYMPHOCYTES NFR BLD AUTO: 22.7 % (ref 19.6–45.3)
MCH RBC QN AUTO: 30.8 PG (ref 26.6–33)
MCHC RBC AUTO-ENTMCNC: 32.7 G/DL (ref 31.5–35.7)
MCV RBC AUTO: 94.4 FL (ref 79–97)
MONOCYTES # BLD AUTO: 0.9 10*3/MM3 (ref 0.1–0.9)
MONOCYTES NFR BLD AUTO: 10.5 % (ref 5–12)
NEUTROPHILS # BLD AUTO: 5.18 10*3/MM3 (ref 1.7–7)
NEUTROPHILS NFR BLD AUTO: 60.5 % (ref 42.7–76)
NRBC BLD AUTO-RTO: 0 /100 WBC (ref 0–0.2)
PLATELET # BLD AUTO: 190 10*3/MM3 (ref 140–450)
POTASSIUM SERPL-SCNC: 4.2 MMOL/L (ref 3.5–5.2)
PROT SERPL-MCNC: 7 G/DL (ref 6–8.5)
RBC # BLD AUTO: 4.83 10*6/MM3 (ref 4.14–5.8)
SODIUM SERPL-SCNC: 141 MMOL/L (ref 136–145)
TRIGL SERPL-MCNC: 159 MG/DL (ref 0–150)
VLDLC SERPL CALC-MCNC: 28 MG/DL (ref 5–40)
WBC # BLD AUTO: 8.55 10*3/MM3 (ref 3.4–10.8)

## 2022-03-07 RX ORDER — MELOXICAM 15 MG/1
15 TABLET ORAL DAILY
Qty: 30 TABLET | Refills: 5 | Status: SHIPPED | OUTPATIENT
Start: 2022-03-07 | End: 2022-05-24

## 2022-03-22 DIAGNOSIS — F41.1 GENERALIZED ANXIETY DISORDER: ICD-10-CM

## 2022-03-22 RX ORDER — ALPRAZOLAM 0.25 MG/1
TABLET ORAL
Qty: 30 TABLET | Refills: 2 | Status: SHIPPED | OUTPATIENT
Start: 2022-03-22 | End: 2022-06-19 | Stop reason: SDUPTHER

## 2022-03-22 NOTE — TELEPHONE ENCOUNTER
Rx Refill Note  Requested Prescriptions     Pending Prescriptions Disp Refills   • ALPRAZolam (XANAX) 0.25 MG tablet [Pharmacy Med Name: ALPRAZOLAM 0.25MG TABLETS] 30 tablet      Sig: TAKE 1 TABLET BY MOUTH EVERY DAY      Last office visit with prescribing clinician: 3/1/2022      Next office visit with prescribing clinician: 4/1/2022            DEEPIKA BOWDEN MA  03/22/22, 12:35 EDT     CSA: 12/21/2021  UDS: N/A

## 2022-04-01 ENCOUNTER — OFFICE VISIT (OUTPATIENT)
Dept: INTERNAL MEDICINE | Facility: CLINIC | Age: 77
End: 2022-04-01

## 2022-04-01 VITALS
HEART RATE: 54 BPM | BODY MASS INDEX: 32.34 KG/M2 | HEIGHT: 71 IN | TEMPERATURE: 97.3 F | DIASTOLIC BLOOD PRESSURE: 80 MMHG | WEIGHT: 231 LBS | OXYGEN SATURATION: 98 % | SYSTOLIC BLOOD PRESSURE: 120 MMHG

## 2022-04-01 DIAGNOSIS — G44.229 CHRONIC TENSION-TYPE HEADACHE, NOT INTRACTABLE: ICD-10-CM

## 2022-04-01 DIAGNOSIS — H93.8X2 SENSATION OF FULLNESS IN LEFT EAR: ICD-10-CM

## 2022-04-01 DIAGNOSIS — I10 PRIMARY HYPERTENSION: Primary | ICD-10-CM

## 2022-04-01 PROBLEM — Z11.59 NEED FOR HEPATITIS C SCREENING TEST: Status: RESOLVED | Noted: 2022-03-01 | Resolved: 2022-04-01

## 2022-04-01 PROBLEM — R42 DIZZINESS: Status: RESOLVED | Noted: 2018-11-30 | Resolved: 2022-04-01

## 2022-04-01 PROCEDURE — 99214 OFFICE O/P EST MOD 30 MIN: CPT | Performed by: INTERNAL MEDICINE

## 2022-04-01 RX ORDER — HYDROCHLOROTHIAZIDE 12.5 MG/1
12.5 TABLET ORAL DAILY
Qty: 90 TABLET | Refills: 1 | Status: SHIPPED | OUTPATIENT
Start: 2022-04-01 | End: 2022-11-07 | Stop reason: SDUPTHER

## 2022-04-01 NOTE — PROGRESS NOTES
Chief Complaint   Patient presents with   • Follow-up     For HTN. States the mobic doesn't seem to be helping.      Subjective   Tristan Hillman is a 76 y.o. male.     Patient here today for follow-up of hypertension and headaches.  He is here today primarily for follow-up of headaches.  We discontinued amlodipine to determine if this was because of his chronic headaches and increased his lisinopril to 20 mg daily, added low-dose hydrochlorothiazide 12.5 mg daily and continued his carvedilol at 6.25 mg twice daily.  He states that he is having much less HA. Now he is only having allergy HA about once a week.  He also feels his BP is doing better with this change.  Home reads are running 120-140/70-80.   He is also on Mobic 15 mg daily for underlying DJD that is affecting primarily his left hip and low back but states this is not very helpful.  He is also using blue EMU cream and feels that is helpful.   He does take tylenol and feels it is helpful.   He has chronic hearing loss and is using bilateral hearing aids.  He is continuing to complain of ongoing fullness in left ear.  He has seen 2 different ENT doctors and states that they have not helped him with the fullness sensation in the left ear and he would like to see a different ENT doctor.  He does not have pain in left ear only fullness.  It feels as though there is something lodged behind his hearing aid and he would like to have this checked again.        The following portions of the patient's history were reviewed and updated as appropriate: allergies, current medications, past family history, past medical history, past social history, past surgical history and problem list.    Review of Systems   Constitutional: Negative for activity change, appetite change and unexpected weight change.   Eyes: Negative for visual disturbance.   Respiratory: Negative for shortness of breath.    Cardiovascular: Negative for chest pain, palpitations and leg swelling.  "  Gastrointestinal: Negative for abdominal pain.   Genitourinary: Negative for hematuria.   Musculoskeletal: Positive for arthralgias and back pain.   Allergic/Immunologic: Positive for environmental allergies.   Neurological: Negative for headaches.        Intermittent allergy headaches   Psychiatric/Behavioral: Negative for dysphoric mood and sleep disturbance.       Objective   /80   Pulse 54   Temp 97.3 °F (36.3 °C)   Ht 180.3 cm (71\")   Wt 105 kg (231 lb)   SpO2 98%   BMI 32.22 kg/m²   Body mass index is 32.22 kg/m².  Physical Exam  Vitals and nursing note reviewed.   Constitutional:       Appearance: Normal appearance.      Comments: Kind and pleasant man in no distress today   HENT:      Head: Normocephalic and atraumatic.      Right Ear: External ear normal.      Left Ear: External ear normal.   Eyes:      General:         Right eye: No discharge.         Left eye: No discharge.      Extraocular Movements: Extraocular movements intact.   Cardiovascular:      Rate and Rhythm: Normal rate and regular rhythm.      Heart sounds: Murmur heard.      Comments: Systolic murmur grade 2/6 over precordium  Pulmonary:      Effort: Pulmonary effort is normal. No respiratory distress.   Musculoskeletal:      Right lower leg: No edema.      Left lower leg: No edema.   Lymphadenopathy:      Cervical: No cervical adenopathy.   Neurological:      General: No focal deficit present.      Mental Status: He is alert and oriented to person, place, and time.   Psychiatric:         Mood and Affect: Mood normal.         Behavior: Behavior normal.         Thought Content: Thought content normal.         Judgment: Judgment normal.         Assessment/Plan   Tristan Hillman is here today and the following problems have been addressed:      Diagnoses and all orders for this visit:    1. Primary hypertension (Primary)    2. Chronic tension-type headache, not intractable    3. Sensation of fullness in left ear  -     Ambulatory " Referral to ENT (Otolaryngology)    Other orders  -     hydroCHLOROthiazide (HYDRODIURIL) 12.5 MG tablet; Take 1 tablet by mouth Daily.  Dispense: 90 tablet; Refill: 1        Follow heart healthy/low salt diet  Avoid processed foods  Monitor blood pressure as discussed  Exercise as tolerated   Take all medications as prescribed  BP well controlled on current medications  Headache is essentially resolved since discontinuing amlodipine, he feels well with current dose of lisinopril and hydrochlorothiazide  Encouraged him to follow-up with orthopedics for his left low back and hip pain, however he declined at this time and will continue Tylenol and blue emu cream as needed  Refer to ENT for complaints of recurrent left ear fullness    Return in about 4 months (around 8/1/2022) for Next scheduled follow up.      Marilyn K. Vermeesch, MD      Please note that portions of this note were completed with a voice recognition program.  Efforts were made to edit dictation, but occasionally words are mistranscribed.

## 2022-05-24 ENCOUNTER — HOSPITAL ENCOUNTER (OUTPATIENT)
Dept: GENERAL RADIOLOGY | Facility: HOSPITAL | Age: 77
Discharge: HOME | End: 2022-05-24
Admitting: INTERNAL MEDICINE

## 2022-05-24 ENCOUNTER — OFFICE VISIT (OUTPATIENT)
Dept: INTERNAL MEDICINE | Facility: CLINIC | Age: 77
End: 2022-05-24

## 2022-05-24 VITALS
TEMPERATURE: 97.1 F | WEIGHT: 227 LBS | HEIGHT: 71 IN | DIASTOLIC BLOOD PRESSURE: 70 MMHG | HEART RATE: 53 BPM | SYSTOLIC BLOOD PRESSURE: 126 MMHG | BODY MASS INDEX: 31.78 KG/M2 | OXYGEN SATURATION: 97 %

## 2022-05-24 DIAGNOSIS — M25.511 ACUTE PAIN OF RIGHT SHOULDER: Primary | ICD-10-CM

## 2022-05-24 DIAGNOSIS — M25.511 ACUTE PAIN OF RIGHT SHOULDER: ICD-10-CM

## 2022-05-24 PROCEDURE — 73030 X-RAY EXAM OF SHOULDER: CPT

## 2022-05-24 PROCEDURE — 99214 OFFICE O/P EST MOD 30 MIN: CPT | Performed by: INTERNAL MEDICINE

## 2022-05-24 RX ORDER — TRAMADOL HYDROCHLORIDE 50 MG/1
50 TABLET ORAL EVERY 8 HOURS PRN
Qty: 90 TABLET | Refills: 1 | Status: SHIPPED | OUTPATIENT
Start: 2022-05-24 | End: 2022-12-12

## 2022-05-24 RX ORDER — TRIAMCINOLONE ACETONIDE 0.25 MG/G
CREAM TOPICAL 2 TIMES DAILY
Qty: 45 G | Refills: 0 | Status: SHIPPED | OUTPATIENT
Start: 2022-05-24 | End: 2022-12-12

## 2022-05-24 NOTE — PROGRESS NOTES
Please tell patient or his daughter that x-ray reveals evidence of degenerative or osteoarthritis.  In addition there is also changes consistent with rotator cuff injury.  Please tell him to be certain to bring copy of this x-ray with him to orthopedic surgeon appointment.

## 2022-05-24 NOTE — PROGRESS NOTES
"Chief Complaint   Patient presents with   • Abstract     Per Pt: Tuesday evening my right shoulder made a popping noise and had extreme pain with it.  I went to a massage therapist hoping she could help but she suggested I have it checked by my doctor.  Still have pain and struggle with using that arm.  Also would like to discuss some arthritis medicine.    Would like tick bite on upper right side looked at.     Subjective   Tristan Hillman is a 77 y.o. male.     Here today with complaints of right shoulder pain for past one week.   Pt states he felt a pop and since then is having severe pain.  He went to massage therapist but this did not help and he was told to come here for evaluation.   He is having difficulty using arm due to pain.   His daughter came up behind him and scared him and he jumped, she and he heard a pop and he has been hurting since.   He has limited ROM in arm since that occurred.    He has been taking some aleve and it helps some.        He also has a tick bit on right torso.       The following portions of the patient's history were reviewed and updated as appropriate: allergies, current medications, past family history, past medical history, past social history, past surgical history and problem list.    Review of Systems   Constitutional: Positive for activity change. Negative for chills and fever.   Musculoskeletal: Positive for arthralgias.   Skin:        Tick bite on torso       Objective   /70   Pulse 53   Temp 97.1 °F (36.2 °C)   Ht 180.3 cm (71\")   Wt 103 kg (227 lb)   SpO2 97%   BMI 31.66 kg/m²   Body mass index is 31.66 kg/m².  Physical Exam  Vitals and nursing note reviewed.   Constitutional:       General: He is not in acute distress.     Appearance: Normal appearance. He is not ill-appearing.      Comments: Pleasant man, appears his age, holding right arm close to chest   HENT:      Right Ear: External ear normal.      Left Ear: External ear normal.      Ears:      " Comments: Hearing aids in place, very hard of hearing  Eyes:      General:         Right eye: No discharge.         Left eye: No discharge.      Extraocular Movements: Extraocular movements intact.   Pulmonary:      Effort: Pulmonary effort is normal. No respiratory distress.   Musculoskeletal:         General: Tenderness present.      Comments: Patient with mild tenderness over anterior biceps tendon, he has significant pain with active range of motion in any plane of movement, he has pain with passive range of motion with anterior range of movement, he has positive drop test, pain with range of motion above 90 degrees abduction and severe pain with lowering arm to side i.e. positive drop test   Skin:     Comments: Right upper lateral torso with approximate 2 cm linear area of erythema and central tick bite with no overlying warmth and no evidence of infection   Neurological:      General: No focal deficit present.      Mental Status: He is alert and oriented to person, place, and time.   Psychiatric:         Mood and Affect: Mood normal.         Behavior: Behavior normal.         Thought Content: Thought content normal.         Judgment: Judgment normal.         Assessment & Plan   Tristan Hillman is here today and the following problems have been addressed:      Diagnoses and all orders for this visit:    1. Acute pain of right shoulder (Primary)  -     XR Shoulder 2+ View Right; Future  -     Ambulatory Referral to Orthopedic Surgery  -     traMADol (ULTRAM) 50 MG tablet; Take 1 tablet by mouth Every 8 (Eight) Hours As Needed for Moderate Pain .  Dispense: 90 tablet; Refill: 1    Other orders  -     triamcinolone (KENALOG) 0.025 % cream; Apply  topically to the appropriate area as directed 2 (Two) Times a Day. To rash  Dispense: 45 g; Refill: 0    X-ray right shoulder, patient encouraged to obtain copy to take to orthopedic appointment  Referred to orthopedics for evaluation of severe right shoulder pain, this is  acute on chronic pain  Suspect patient has osteoarthritis changes but also rotator cuff injury  Patient has been on Mobic for quite some time and states this is not helpful so we will discontinue that medication  He states Aleve 2 tablets at night is more helpful for pain and he may continue this medication with food  Also will provide tramadol to take every 8 hours as needed for moderate pain  Patient has signed controlled substance contract  Tick bite on right posterior lateral torso does not appear infected, encouraged daughter to monitor and contact me if this changes    Return to clinic as needed or as previously scheduled    Please note that portions of this note were completed with a voice recognition program.  Efforts were made to edit dictation, but occasionally words are mistranscribed.

## 2022-05-27 ENCOUNTER — OFFICE VISIT (OUTPATIENT)
Dept: ORTHOPEDIC SURGERY | Facility: CLINIC | Age: 77
End: 2022-05-27

## 2022-05-27 ENCOUNTER — PATIENT ROUNDING (BHMG ONLY) (OUTPATIENT)
Dept: ORTHOPEDIC SURGERY | Facility: CLINIC | Age: 77
End: 2022-05-27

## 2022-05-27 VITALS
SYSTOLIC BLOOD PRESSURE: 128 MMHG | WEIGHT: 227.07 LBS | HEIGHT: 71 IN | DIASTOLIC BLOOD PRESSURE: 82 MMHG | BODY MASS INDEX: 31.79 KG/M2

## 2022-05-27 DIAGNOSIS — M75.51 BURSITIS OF RIGHT SHOULDER: ICD-10-CM

## 2022-05-27 DIAGNOSIS — M75.41 IMPINGEMENT SYNDROME OF RIGHT SHOULDER: ICD-10-CM

## 2022-05-27 DIAGNOSIS — M75.01 ADHESIVE CAPSULITIS OF RIGHT SHOULDER: Primary | ICD-10-CM

## 2022-05-27 DIAGNOSIS — S46.001A ROTATOR CUFF INJURY, RIGHT, INITIAL ENCOUNTER: ICD-10-CM

## 2022-05-27 PROCEDURE — 99204 OFFICE O/P NEW MOD 45 MIN: CPT | Performed by: ORTHOPAEDIC SURGERY

## 2022-05-27 PROCEDURE — 20610 DRAIN/INJ JOINT/BURSA W/O US: CPT | Performed by: ORTHOPAEDIC SURGERY

## 2022-05-27 RX ORDER — MELOXICAM 7.5 MG/1
TABLET ORAL
Qty: 30 TABLET | Refills: 1 | Status: SHIPPED | OUTPATIENT
Start: 2022-05-27 | End: 2022-07-27

## 2022-05-27 RX ORDER — METHYLPREDNISOLONE 4 MG/1
TABLET ORAL
Qty: 1 EACH | Refills: 0 | Status: SHIPPED | OUTPATIENT
Start: 2022-05-27 | End: 2022-08-01

## 2022-05-27 RX ORDER — TRIAMCINOLONE ACETONIDE 40 MG/ML
40 INJECTION, SUSPENSION INTRA-ARTICULAR; INTRAMUSCULAR
Status: COMPLETED | OUTPATIENT
Start: 2022-05-27 | End: 2022-05-27

## 2022-05-27 RX ORDER — LIDOCAINE HYDROCHLORIDE 10 MG/ML
5 INJECTION, SOLUTION EPIDURAL; INFILTRATION; INTRACAUDAL; PERINEURAL
Status: COMPLETED | OUTPATIENT
Start: 2022-05-27 | End: 2022-05-27

## 2022-05-27 RX ADMIN — LIDOCAINE HYDROCHLORIDE 5 ML: 10 INJECTION, SOLUTION EPIDURAL; INFILTRATION; INTRACAUDAL; PERINEURAL at 11:07

## 2022-05-27 RX ADMIN — TRIAMCINOLONE ACETONIDE 40 MG: 40 INJECTION, SUSPENSION INTRA-ARTICULAR; INTRAMUSCULAR at 11:07

## 2022-05-27 NOTE — PROGRESS NOTES
May 27, 2022    Hello, may I speak with Tristan Hillman?    My name is Azaila      I am  with [facility]  [address]  [place] [place] [address].    Before we get started may I verify your date of birth? 1945    I am calling to officially welcome you to our practice and ask about your recent visit. Is this a good time to talk? No. Mr. Hillman was not at home.  Ms. Hillman will pass the message along.      Thank you, and have a great day.

## 2022-05-27 NOTE — PROGRESS NOTES
Procedure   Large Joint Arthrocentesis: R subacromial bursa  Date/Time: 5/27/2022 11:07 AM  Consent given by: patient  Site marked: site marked  Timeout: Immediately prior to procedure a time out was called to verify the correct patient, procedure, equipment, support staff and site/side marked as required   Supporting Documentation  Indications: pain   Procedure Details  Location: shoulder - R subacromial bursa  Preparation: Patient was prepped and draped in the usual sterile fashion  Needle size: 22 G  Approach: posterior  Medications administered: 5 mL lidocaine PF 1% 1 %; 40 mg triamcinolone acetonide 40 MG/ML  Patient tolerance: patient tolerated the procedure well with no immediate complications

## 2022-05-27 NOTE — PROGRESS NOTES
[facility] Orthopaedic Surgery Office Visit - Leon Sotelo MD    Office Visit       Patient Name: Tristan Hillman    Chief Complaint:   Chief Complaint   Patient presents with   • Right Shoulder - Pain       Referring Physician: Vermeesch, Marilyn K, MD-I appreciate the referral  History of Present Illness:   Tristan Hillman is a 77 y.o. male who presents with right body part: shoulder Reason: pain.  Onset:Onset: atraumatic and gradual in nature. The issue has been ongoing for 1 week(s). Pain is a 8/10 on the pain scale. Pain is described as Pain Characterization: stabbing. Associated symptoms include Symptoms: pain. The pain is worse with walking, standing, sitting, sleeping, leisure, lying on affected side, rising from seated position and any movement of the joint; resting improve the pain. Previous treatments have included: I have reviewed the patient's history of present illness as noted/entered above.    I have reviewed the patient's past medical history, surgical history, social history, family history, medications, and allergies as noted in the electronic medical record and as noted/entered.  I have reviewed the patient's review of systems as noted/enter and updated as noted in the patient's HPI..     Right shoulder pain      Subjective   Subjective      Review of Systems   Constitutional: Negative.  Negative for chills, fatigue and fever.   HENT: Positive for hearing loss. Negative for congestion and dental problem.    Eyes: Negative.  Negative for blurred vision.   Respiratory: Negative.  Negative for shortness of breath.    Cardiovascular: Negative.  Negative for leg swelling.   Gastrointestinal: Negative.  Negative for abdominal pain.   Endocrine: Negative.  Negative for polyuria.   Genitourinary: Negative.  Negative for difficulty urinating.   Musculoskeletal: Positive for arthralgias, back pain, neck pain and neck stiffness.   Skin: Negative.     Allergic/Immunologic: Negative.    Neurological: Positive for dizziness.   Hematological: Negative.  Negative for adenopathy.   Psychiatric/Behavioral: Negative for behavioral problems. The patient is nervous/anxious.         Past Medical History:   Past Medical History:   Diagnosis Date   • Ankle sprain    • Anxiety    • Arthritis of back    • Arthritis of neck    • Bursitis of hip    • Coronary artery disease    • GERD (gastroesophageal reflux disease)    • Hip arthrosis    • History of insomnia    • Hyperlipidemia    • Hypertension    • Low back pain    • Low back strain    • Neck strain    • Periarthritis of shoulder    • Rotator cuff syndrome    • Tennis elbow        Past Surgical History:   Past Surgical History:   Procedure Laterality Date   • CORONARY STENT PLACEMENT         Family History:   Family History   Problem Relation Age of Onset   • Cancer Sister    • Diabetes Sister        Social History:   Social History     Socioeconomic History   • Marital status:    Tobacco Use   • Smoking status: Never Smoker   • Smokeless tobacco: Never Used   • Tobacco comment: Chewed on cigars   Vaping Use   • Vaping Use: Never used   Substance and Sexual Activity   • Alcohol use: Not Currently   • Drug use: No   • Sexual activity: Defer       Medications:   Current Outpatient Medications:   •  ALPRAZolam (XANAX) 0.25 MG tablet, TAKE 1 TABLET BY MOUTH EVERY DAY, Disp: 30 tablet, Rfl: 2  •  Ascorbic Acid (VITAMIN C PO), Take  by mouth., Disp: , Rfl:   •  aspirin 81 MG tablet, Take 1 tablet by mouth Daily., Disp: 30 tablet, Rfl: 0  •  carvedilol (COREG) 6.25 MG tablet, Take 1 tablet by mouth 2 (Two) Times a Day., Disp: 180 tablet, Rfl: 1  •  hydroCHLOROthiazide (HYDRODIURIL) 12.5 MG tablet, Take 1 tablet by mouth Daily., Disp: 90 tablet, Rfl: 1  •  lisinopril (PRINIVIL,ZESTRIL) 20 MG tablet, Take 1 tablet by mouth 2 (Two) Times a Day., Disp: 180 tablet, Rfl: 1  •  Multiple Vitamins-Minerals (ZINC PO), Take  by  "mouth., Disp: , Rfl:   •  nitroglycerin (NITROSTAT) 0.4 MG SL tablet, place 1 tablet under the tongue if needed every 5 minutes fo, Disp: 25 tablet, Rfl: 5  •  omeprazole (priLOSEC) 20 MG capsule, TAKE 1 CAPSULE BY MOUTH EVERY DAY, Disp: 90 capsule, Rfl: 3  •  rosuvastatin (CRESTOR) 20 MG tablet, Take 20 mg by mouth Daily., Disp: , Rfl:   •  traMADol (ULTRAM) 50 MG tablet, Take 1 tablet by mouth Every 8 (Eight) Hours As Needed for Moderate Pain ., Disp: 90 tablet, Rfl: 1  •  triamcinolone (KENALOG) 0.025 % cream, Apply  topically to the appropriate area as directed 2 (Two) Times a Day. To rash, Disp: 45 g, Rfl: 0  •  VITAMIN D PO, Take  by mouth., Disp: , Rfl:   •  meloxicam (MOBIC) 7.5 MG tablet, 1 Oral Daily with food., Disp: 30 tablet, Rfl: 1  •  methylPREDNISolone (MEDROL) 4 MG dose pack, Use as directed by package instructions, Disp: 1 each, Rfl: 0    Allergies: No Known Allergies    The following portions of the patient's history were reviewed and updated as appropriate: allergies, current medications, past family history, past medical history, past social history, past surgical history and problem list.        Objective    Objective      Vital Signs:   Vitals:    05/27/22 1039   BP: 128/82   Weight: 103 kg (227 lb 1.2 oz)   Height: 180.3 cm (70.98\")       Ortho Exam:  General: no acute distress, comfortable  Vitals reviewed in chart    Musculoskeletal Exam    SIDE: Right  Shoulder Exam:    Tenderness: rotator cuff and glenohumeral joint with contracture    Range of motion measurements (degrees)  Forward flexion/Abduction/External rotation at side/ER at 90/IR at 90/IR position  Active: Limited due to pain and contracture 110/110/30/50/40 buttock  Passive: Pain limited and contracture noted    Painful arc of motion: yes  No evidence of septic joint  Pain with forward flexion and abduction greater: 90 degrees  Impingement testing Neer's test - positive/painful  Impingement testing Hawkin's test - " positive/painful    Rotator Cuff Testing:  Tenderness to palpation at rotator cuff - YES  Rotator cuff testing Tracy's test - positive  Rotator cuff testing External rotation - no  Rotator cuff testing Lag signs - no  Rotator cuff testing Belly press - no  Pain with abduction great than 90 degrees - yes    Scapular dyskinesis - present, abnormal scapular motion    Long head of the biceps testing:  Hernandez's test for biceps & Speed's test - pain  Bicipital groove tenderness to palpation/tenderness to palpation of biceps tendon - pain        Results Review:   Imaging Results (Last 24 Hours)     ** No results found for the last 24 hours. **        XR Shoulder 2+ View Right    Result Date: 5/24/2022  There is mild narrowing of the subacromial space which may reflect underlying rotator cuff pathology. The soft tissues appear unremarkable. No acute fracture or malalignment. Moderate osteophytic change of the acromioclavicular joint is noted. There is mild osteophytic change of the glenohumeral joint. Unremarkable appearance of the partially imaged right hemithorax.  This report was finalized on 5/24/2022 3:47 PM by Gera De La Cruz MD.      I personally reviewed the radiographs above and interpreted.      Procedures     RIGHT SHOULDER SUBACROMIAL SPACE INJECTION: Risks and benefits of a shoulder subacromial space injection were discussed and the patient desired to proceed. Verbal consent was obtained. The patient understood the risk of infection, potential skin changes, bump in blood glucose especially with diabetes, nerve injury, possibility of increased pain in the short term, and possible incomplete pain relief.  Using sterile technique, the shoulder subacromial space was injected from a posterior approach with 1mL of 40 mg triamcinolone acetonide 40 MG/ML and 4cc of lidocaine with aspiration prior to injection. The patient tolerated the procedure without difficulty.  CPT CODE 89582 for major joint  aspiration/injection          Assessment / Plan      Assessment/Plan:   Problem List Items Addressed This Visit        Musculoskeletal and Injuries    Adhesive capsulitis of right shoulder - Primary    Relevant Medications    methylPREDNISolone (MEDROL) 4 MG dose pack    meloxicam (MOBIC) 7.5 MG tablet    Other Relevant Orders    MRI Shoulder Right Without Contrast    Ambulatory Referral to Physical Therapy Evaluate and treat    Impingement syndrome of right shoulder    Relevant Medications    methylPREDNISolone (MEDROL) 4 MG dose pack    meloxicam (MOBIC) 7.5 MG tablet    Other Relevant Orders    MRI Shoulder Right Without Contrast    Ambulatory Referral to Physical Therapy Evaluate and treat    Bursitis of right shoulder    Relevant Medications    methylPREDNISolone (MEDROL) 4 MG dose pack    meloxicam (MOBIC) 7.5 MG tablet    Other Relevant Orders    MRI Shoulder Right Without Contrast    Ambulatory Referral to Physical Therapy Evaluate and treat       Other    Rotator cuff injury, right, initial encounter    Relevant Medications    methylPREDNISolone (MEDROL) 4 MG dose pack    meloxicam (MOBIC) 7.5 MG tablet    Other Relevant Orders    MRI Shoulder Right Without Contrast    Ambulatory Referral to Physical Therapy Evaluate and treat          RIGHT SHOULDER  MRI of the shoulder is recommended.  Indication: suspected rotator cuff tear  The MRI is critical to evaluate for rotator cuff tearing and will help with possible surgical planning.    Complex scenario with adhesive capsulitis and potentially underlying rotator cuff pathology.    We discussed we typically withhold injection in the setting of possible rotator cuff pathology but I do think he would prefer a conservative course.  I do think it is reasonable to proceed with an injection today get the MRI and see him back in follow-up as well.    Physical therapy prescription provided and program discussed    Medrol dosepak - risks and benefit of this medication  was discussed including risks with Diabetes and bump in blood glucose.  A 6-day steroid Medrol dosepak was recommended followed by an NSAID after the dosepak is finished.      NSAIDS - risks and benefits of these medications were discussed.  These medications are certainly not without risks, but they can provide relief of pain caused due to inflammation.    Follow Up: After right shoulder MRI        Leon Sotelo MD, FAAOS  Orthopedic Surgeon  Fellowship Trained Shoulder and Elbow Surgeon  [facility]  Orthopedics and Sports Medicine  [address] [place] [address], [address]  [place] [address]    05/27/22  13:25 EDT

## 2022-06-01 ENCOUNTER — TELEPHONE (OUTPATIENT)
Dept: ORTHOPEDIC SURGERY | Facility: CLINIC | Age: 77
End: 2022-06-01

## 2022-06-01 NOTE — TELEPHONE ENCOUNTER
Caller: Alayna Onofre    Relationship to patient: Emergency Contact    [phone]    Patient is needing: CHECKING STATUS OF MRI REFERRAL, PATIENT WANTS TO GO TO [place] FOR MRI

## 2022-06-08 ENCOUNTER — TELEPHONE (OUTPATIENT)
Dept: ORTHOPEDIC SURGERY | Facility: CLINIC | Age: 77
End: 2022-06-08

## 2022-06-19 DIAGNOSIS — F41.1 GENERALIZED ANXIETY DISORDER: ICD-10-CM

## 2022-06-20 RX ORDER — ALPRAZOLAM 0.25 MG/1
0.25 TABLET ORAL DAILY
Qty: 30 TABLET | Refills: 2 | Status: SHIPPED | OUTPATIENT
Start: 2022-06-20 | End: 2022-09-25 | Stop reason: SDUPTHER

## 2022-06-20 NOTE — TELEPHONE ENCOUNTER
Last filled: 3/22/22  Days worth: 30  Refills: 2    Last appointment: 5/24/22  Next appointment: 8/1/22    Labs: FABY  CSA: 5/24/22

## 2022-06-23 ENCOUNTER — TRANSCRIBE ORDERS (OUTPATIENT)
Dept: ADMINISTRATIVE | Facility: HOSPITAL | Age: 77
End: 2022-06-23

## 2022-06-23 DIAGNOSIS — M75.41 IMPINGEMENT SYNDROME OF RIGHT SHOULDER: ICD-10-CM

## 2022-06-23 DIAGNOSIS — M75.01 ADHESIVE BURSITIS OF RIGHT SHOULDER: Primary | ICD-10-CM

## 2022-06-23 DIAGNOSIS — M75.51 BURSITIS OF RIGHT SHOULDER: ICD-10-CM

## 2022-06-23 DIAGNOSIS — S46.001A ROTATOR CUFF INJURY, RIGHT, INITIAL ENCOUNTER: ICD-10-CM

## 2022-07-27 ENCOUNTER — HOSPITAL ENCOUNTER (OUTPATIENT)
Dept: MRI IMAGING | Facility: HOSPITAL | Age: 77
Discharge: HOME | End: 2022-07-27
Admitting: ORTHOPAEDIC SURGERY

## 2022-07-27 DIAGNOSIS — S46.001A ROTATOR CUFF INJURY, RIGHT, INITIAL ENCOUNTER: ICD-10-CM

## 2022-07-27 DIAGNOSIS — M75.41 IMPINGEMENT SYNDROME OF RIGHT SHOULDER: ICD-10-CM

## 2022-07-27 DIAGNOSIS — M75.51 BURSITIS OF RIGHT SHOULDER: ICD-10-CM

## 2022-07-27 DIAGNOSIS — M75.01 ADHESIVE BURSITIS OF RIGHT SHOULDER: ICD-10-CM

## 2022-07-27 DIAGNOSIS — M75.01 ADHESIVE CAPSULITIS OF RIGHT SHOULDER: ICD-10-CM

## 2022-07-27 PROCEDURE — 73221 MRI JOINT UPR EXTREM W/O DYE: CPT

## 2022-07-27 RX ORDER — MELOXICAM 7.5 MG/1
TABLET ORAL
Qty: 30 TABLET | Refills: 1 | Status: SHIPPED | OUTPATIENT
Start: 2022-07-27 | End: 2022-10-22 | Stop reason: SDUPTHER

## 2022-08-01 ENCOUNTER — OFFICE VISIT (OUTPATIENT)
Dept: INTERNAL MEDICINE | Facility: CLINIC | Age: 77
End: 2022-08-01

## 2022-08-01 VITALS
BODY MASS INDEX: 32.2 KG/M2 | DIASTOLIC BLOOD PRESSURE: 80 MMHG | TEMPERATURE: 98.1 F | HEIGHT: 71 IN | WEIGHT: 230 LBS | HEART RATE: 81 BPM | OXYGEN SATURATION: 97 % | SYSTOLIC BLOOD PRESSURE: 136 MMHG

## 2022-08-01 DIAGNOSIS — Z51.81 MEDICATION MONITORING ENCOUNTER: ICD-10-CM

## 2022-08-01 DIAGNOSIS — G60.9 IDIOPATHIC PERIPHERAL NEUROPATHY: ICD-10-CM

## 2022-08-01 DIAGNOSIS — G47.00 INSOMNIA, UNSPECIFIED TYPE: ICD-10-CM

## 2022-08-01 DIAGNOSIS — N40.1 BENIGN PROSTATIC HYPERPLASIA WITH NOCTURIA: ICD-10-CM

## 2022-08-01 DIAGNOSIS — R35.1 BENIGN PROSTATIC HYPERPLASIA WITH NOCTURIA: ICD-10-CM

## 2022-08-01 DIAGNOSIS — I10 PRIMARY HYPERTENSION: ICD-10-CM

## 2022-08-01 DIAGNOSIS — F41.1 GENERALIZED ANXIETY DISORDER: ICD-10-CM

## 2022-08-01 DIAGNOSIS — E78.2 MIXED HYPERLIPIDEMIA: ICD-10-CM

## 2022-08-01 PROBLEM — H93.8X2 SENSATION OF FULLNESS IN LEFT EAR: Status: RESOLVED | Noted: 2022-04-01 | Resolved: 2022-08-01

## 2022-08-01 PROBLEM — M25.511 ACUTE PAIN OF RIGHT SHOULDER: Status: RESOLVED | Noted: 2022-05-24 | Resolved: 2022-08-01

## 2022-08-01 PROCEDURE — 99214 OFFICE O/P EST MOD 30 MIN: CPT | Performed by: INTERNAL MEDICINE

## 2022-08-01 RX ORDER — ESCITALOPRAM OXALATE 5 MG/1
5 TABLET ORAL DAILY
Qty: 30 TABLET | Refills: 5 | Status: SHIPPED | OUTPATIENT
Start: 2022-08-01 | End: 2022-09-25 | Stop reason: SDUPTHER

## 2022-08-01 NOTE — PROGRESS NOTES
"Chief Complaint   Patient presents with   • Follow-up     For GERD, anxiety, HLD, HTN, and insomnia. Pt's felt \"swimmy headed\" X since he had COVID back in June.     Subjective   Tristan Hillman is a 77 y.o. male.     Here today for follow up of HTN, HLD, anxiety, GERD, PN.   HTN/HLDCAD-  His BP is well controlled today.  He denies CP, palpitations, some edema. He has not been doing any walking or exercise.  He gets winded with exertion.  Complains of intermittent dizziness over the past few months since he had COVID in June.  He gets an occasional HA.    Anxiety- he continues to take xanax every PM. He continues to have anxiety.  His difficulty hearing causes him to feel excluded.  He gets easily agitated.   GERD- he denies any GERD sxs, takes omeprazole daily  PN- has some burning and numbness in feet intermittently at night.    BPH -he is awakening 2-5 times a night.  No dribbling or hesitancy, but he has a weak stream.    Insomnia -  States that he sleeps fair with use of xanax.   Right shoulder pain-he has right shoulder adhesive capsulitis.  He was given steroid injection and had recent MRI and will follow up with Dr Sotelo.  PT has been ordered.   HCM-  Pneumovax and colonoscopy all up-to-date.  He has not had a hepatitis A or shingles vaccine. Declines Covid vaccine.       The following portions of the patient's history were reviewed and updated as appropriate: allergies, current medications, past family history, past medical history, past social history, past surgical history and problem list.    Review of Systems   Constitutional: Negative for activity change, appetite change and unexpected weight change.   HENT: Positive for hearing loss.    Eyes: Negative for visual disturbance.   Respiratory: Negative for shortness of breath.    Cardiovascular: Negative for chest pain, palpitations and leg swelling.   Gastrointestinal: Negative for abdominal pain.   Genitourinary: Negative for hematuria.   Musculoskeletal: " "Negative for back pain.   Neurological: Positive for dizziness, numbness and headaches.        Burning and numbness in feet   Psychiatric/Behavioral: Positive for agitation. Negative for dysphoric mood and sleep disturbance. The patient is nervous/anxious.        Objective   /80   Pulse 81   Temp 98.1 °F (36.7 °C)   Ht 180.3 cm (70.98\")   Wt 104 kg (230 lb)   SpO2 97%   BMI 32.10 kg/m²   Body mass index is 32.1 kg/m².  Physical Exam  Vitals and nursing note reviewed.   Constitutional:       General: He is not in acute distress.     Appearance: Normal appearance. He is well-developed. He is obese. He is not ill-appearing.      Comments: Kind and pleasant man, appears stated age and in NAD today   HENT:      Head: Normocephalic and atraumatic.      Right Ear: External ear normal.      Left Ear: External ear normal.      Ears:      Comments: Hearing aids in place, significant hearing loss even with hearing aids  Eyes:      General:         Right eye: No discharge.         Left eye: No discharge.      Extraocular Movements: Extraocular movements intact.      Conjunctiva/sclera: Conjunctivae normal.   Neck:      Thyroid: No thyromegaly.      Vascular: No carotid bruit.      Comments: No thyromegaly or mass  Cardiovascular:      Rate and Rhythm: Normal rate and regular rhythm.      Pulses: Normal pulses.      Heart sounds: Normal heart sounds. No murmur heard.  Pulmonary:      Effort: Pulmonary effort is normal. No respiratory distress.      Breath sounds: Normal breath sounds. No wheezing.   Abdominal:      General: Bowel sounds are normal. There is no distension.      Palpations: Abdomen is soft.      Tenderness: There is no abdominal tenderness.   Musculoskeletal:      Cervical back: Normal range of motion and neck supple.      Right lower leg: No edema.      Left lower leg: No edema.      Comments: Right shoulder with improved range of motion but still unable to fully raise overhead, significant crepitus " of right shoulder   Lymphadenopathy:      Cervical: No cervical adenopathy.   Skin:     General: Skin is warm.      Findings: No rash.   Neurological:      General: No focal deficit present.      Mental Status: He is alert and oriented to person, place, and time. Mental status is at baseline.      Cranial Nerves: No cranial nerve deficit.      Motor: No weakness.      Coordination: Coordination normal.      Gait: Gait normal.   Psychiatric:         Mood and Affect: Mood normal.         Behavior: Behavior normal.         Thought Content: Thought content normal.         Judgment: Judgment normal.         Assessment & Plan   Tristan Hillman is here today and the following problems have been addressed:      Diagnoses and all orders for this visit:    1. Primary hypertension    2. Mixed hyperlipidemia    3. Benign prostatic hyperplasia with nocturia    4. Generalized anxiety disorder    5. Idiopathic peripheral neuropathy    6. Insomnia, unspecified type    7. Medication monitoring encounter  -     Compliance Drug Analysis, Ur - Urine, Clean Catch    Other orders  -     escitalopram (Lexapro) 5 MG tablet; Take 1 tablet by mouth Daily.  Dispense: 30 tablet; Refill: 5        Follow heart healthy/low salt/low-cholesterol diet  Avoid processed foods  Monitor blood pressure on occasion  Exercise as tolerated up to 150 minutes per week  Take all medications as prescribed  BP is well controlled today on current medications  Start Lexapro for underlying anxiety and agitation issues, daughter states she believes his hearing loss is worsening his mood  Continue Xanax at night to help with anxiety and sleep, no signs of abuse  Continue Mobic and tramadol for right shoulder arthritis, he had recent MRI per orthopedic surgeon.  He did not undergo physical therapy per recommendation of surgeon and will have follow-up with surgeon soon  Continue Crestor for hyperlipidemia  His neuropathy symptoms are minimal per his daughter, he rarely  complains of N/T/B  Continue omeprazole as needed for GERD symptoms  Urine drug screen obtained today due to use of Xanax    Return in about 6 weeks (around 9/12/2022) for Next scheduled follow up.  For schedule up of anxiety due to initiation of Lexapro      Marilyn K. Vermeesch, MD      Please note that portions of this note were completed with a voice recognition program.  Efforts were made to edit dictation, but occasionally words are mistranscribed.

## 2022-08-04 LAB — DRUGS UR: NORMAL

## 2022-08-09 ENCOUNTER — OFFICE VISIT (OUTPATIENT)
Dept: ORTHOPEDIC SURGERY | Facility: CLINIC | Age: 77
End: 2022-08-09

## 2022-08-09 VITALS
WEIGHT: 229.28 LBS | HEIGHT: 71 IN | DIASTOLIC BLOOD PRESSURE: 90 MMHG | SYSTOLIC BLOOD PRESSURE: 158 MMHG | BODY MASS INDEX: 32.1 KG/M2

## 2022-08-09 DIAGNOSIS — M75.51 BURSITIS OF RIGHT SHOULDER: ICD-10-CM

## 2022-08-09 DIAGNOSIS — M75.41 IMPINGEMENT SYNDROME OF RIGHT SHOULDER: ICD-10-CM

## 2022-08-09 DIAGNOSIS — S46.001A ROTATOR CUFF INJURY, RIGHT, INITIAL ENCOUNTER: ICD-10-CM

## 2022-08-09 DIAGNOSIS — M75.01 ADHESIVE CAPSULITIS OF RIGHT SHOULDER: ICD-10-CM

## 2022-08-09 DIAGNOSIS — M75.121 NONTRAUMATIC COMPLETE TEAR OF RIGHT ROTATOR CUFF: Primary | ICD-10-CM

## 2022-08-09 PROCEDURE — 99213 OFFICE O/P EST LOW 20 MIN: CPT | Performed by: ORTHOPAEDIC SURGERY

## 2022-08-09 NOTE — PROGRESS NOTES
[facility] Orthopaedic Surgery Office Follow Up       Office Follow Up Visit       Patient Name: Tristan Hillman    Chief Complaint:   Chief Complaint   Patient presents with   • Follow-up     MRI Right Shoulder performed 07.27.2022       Referring Physician: No ref. provider found    History of Present Illness:   It has been 2  month(s) since Tristan Hillman's last visit. Tristan Hillman returns to clinic today for F/U: follow-up of rightBody Part: shoulderReason: pain. The issue has been ongoing for 3 month(s). Tristan Hillman rates HIS/HER: hispain at 6/10 on the pain scale. Previous/current treatments: NSAIDS. Current symptoms:Symptoms: same as prior visit. The pain is worse with rising from seated position and any movement of the joint; resting and pain medication and/or NSAID improves the pain. Overall, he/she: heis doing better.  I have reviewed the patient's history of present illness as noted/entered above.    I have reviewed the patient's past medical history, surgical history, social history, family history, medications, and allergies as noted in the electronic medical record and as noted/entered.  I have reviewed the patient's review of systems as noted/enter and updated as noted in the patient's HPI.    Right shoulder    Prior visit 5/27/2022 --discussed the complexity of frozen shoulder and anticipated rotator cuff pathology.  He did desire steroid injection at that time given the amount of pain.    8/9/2022:  Right shoulder  Overall improvements are noted.  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  He did have some hints of superior migration of the plain radiographs, these findings were confirmed on MRI    Jerod    He does describe some tingling is radiating pain down the arm that would be more neurogenic in neck of origin.  He will discuss with his primary care team  if this does persist over time.  With regards to the shoulder that portion is better despite known chronic massive rotator cuff tears.  We had a good discussion.  Supportive daughter present today.    Subjective   Subjective      Review of Systems   Constitutional: Negative.  Negative for chills, fatigue and fever.   HENT: Negative.  Negative for congestion and dental problem.    Eyes: Negative.  Negative for blurred vision.   Respiratory: Negative.  Negative for shortness of breath.    Cardiovascular: Negative.  Negative for leg swelling.   Gastrointestinal: Negative.  Negative for abdominal pain.   Endocrine: Negative.  Negative for polyuria.   Genitourinary: Negative.  Negative for difficulty urinating.   Musculoskeletal: Positive for arthralgias.   Skin: Negative.    Allergic/Immunologic: Negative.    Neurological: Negative.    Hematological: Negative.  Negative for adenopathy.   Psychiatric/Behavioral: Negative.  Negative for behavioral problems.        Past Medical History:   Past Medical History:   Diagnosis Date   • Ankle sprain    • Anxiety    • Arthritis of back    • Arthritis of neck    • Bursitis of hip    • Coronary artery disease    • GERD (gastroesophageal reflux disease)    • Hip arthrosis    • History of insomnia    • Hyperlipidemia    • Hypertension    • Low back pain    • Low back strain    • Neck strain    • Periarthritis of shoulder    • Rotator cuff syndrome    • Tennis elbow        Past Surgical History:   Past Surgical History:   Procedure Laterality Date   • CORONARY STENT PLACEMENT         Family History:   Family History   Problem Relation Age of Onset   • Cancer Sister    • Diabetes Sister        Social History:   Social History     Socioeconomic History   • Marital status:    Tobacco Use   • Smoking status: Never Smoker   • Smokeless tobacco: Never Used   • Tobacco comment: Chewed on cigars   Vaping Use   • Vaping Use: Never used   Substance and Sexual Activity   • Alcohol use:  Not Currently   • Drug use: No   • Sexual activity: Defer       Medications:   Current Outpatient Medications:   •  ALPRAZolam (XANAX) 0.25 MG tablet, Take 1 tablet by mouth Daily., Disp: 30 tablet, Rfl: 2  •  Ascorbic Acid (VITAMIN C PO), Take  by mouth., Disp: , Rfl:   •  aspirin 81 MG tablet, Take 1 tablet by mouth Daily., Disp: 30 tablet, Rfl: 0  •  carvedilol (COREG) 6.25 MG tablet, Take 1 tablet by mouth 2 (Two) Times a Day., Disp: 180 tablet, Rfl: 1  •  escitalopram (Lexapro) 5 MG tablet, Take 1 tablet by mouth Daily., Disp: 30 tablet, Rfl: 5  •  hydroCHLOROthiazide (HYDRODIURIL) 12.5 MG tablet, Take 1 tablet by mouth Daily., Disp: 90 tablet, Rfl: 1  •  lisinopril (PRINIVIL,ZESTRIL) 20 MG tablet, Take 1 tablet by mouth 2 (Two) Times a Day., Disp: 180 tablet, Rfl: 1  •  meloxicam (MOBIC) 7.5 MG tablet, TAKE 1 TABLET BY MOUTH DAILY WITH FOOD, Disp: 30 tablet, Rfl: 1  •  Multiple Vitamins-Minerals (ZINC PO), Take  by mouth., Disp: , Rfl:   •  nitroglycerin (NITROSTAT) 0.4 MG SL tablet, place 1 tablet under the tongue if needed every 5 minutes fo, Disp: 25 tablet, Rfl: 5  •  omeprazole (priLOSEC) 20 MG capsule, TAKE 1 CAPSULE BY MOUTH EVERY DAY, Disp: 90 capsule, Rfl: 3  •  rosuvastatin (CRESTOR) 20 MG tablet, Take 20 mg by mouth Daily., Disp: , Rfl:   •  traMADol (ULTRAM) 50 MG tablet, Take 1 tablet by mouth Every 8 (Eight) Hours As Needed for Moderate Pain ., Disp: 90 tablet, Rfl: 1  •  triamcinolone (KENALOG) 0.025 % cream, Apply  topically to the appropriate area as directed 2 (Two) Times a Day. To rash, Disp: 45 g, Rfl: 0  •  VITAMIN D PO, Take  by mouth., Disp: , Rfl:     Allergies: No Known Allergies    The following portions of the patient's history were reviewed and updated as appropriate: allergies, current medications, past family history, past medical history, past social history, past surgical history and problem list.        Objective    Objective      Vital Signs:   Vitals:    08/09/22 1117   BP:  "158/90   Weight: 104 kg (229 lb 4.5 oz)   Height: 180.3 cm (70.98\")       Ortho Exam:  Right shoulder demonstrates improved active and passive range of motion today 120/120/45/70/60 last contracture noted today does have anticipated weakness with supraspinatus and infraspinatus given chronic massive tearing.    Results Review:  Imaging Results (Last 24 Hours)     ** No results found for the last 24 hours. **          XR Shoulder 2+ View Right    Result Date: 5/24/2022  There is mild narrowing of the subacromial space which may reflect underlying rotator cuff pathology. The soft tissues appear unremarkable. No acute fracture or malalignment. Moderate osteophytic change of the acromioclavicular joint is noted. There is mild osteophytic change of the glenohumeral joint. Unremarkable appearance of the partially imaged right hemithorax.  This report was finalized on 5/24/2022 3:47 PM by Gera De La Cruz MD.      MRI Shoulder Right Without Contrast    Result Date: 7/28/2022  1. Severe rotator cuff disease with complete tear of the IST having a more recent appearance and SST having a more chronic appearance. 2. Severe AC joint arthropathy with impingement. 3. Severe superior labral degeneration compatible with chronic degenerative tear.  This report was signed and finalized on 7/28/2022 1:10 PM by Jf Murray MD.    I reviewed and interpreted the MRI above.  He has chronic massive tearing of the supra and infraspinatus I agree the supraspinatus has been torn far longer with fatty infiltration and muscle atrophy.  Infraspinatus still however has fatty infiltration noted.  AC joint arthritic changes superior labral tearing, chronic degenerative labral tearing, early appearance of rotator cuff tear arthropathy type appearance    Procedures            Assessment / Plan      Assessment/Plan:   Problem List Items Addressed This Visit        Musculoskeletal and Injuries    Adhesive capsulitis of right shoulder    Relevant " Medications    meloxicam (MOBIC) 7.5 MG tablet    Impingement syndrome of right shoulder    Relevant Medications    meloxicam (MOBIC) 7.5 MG tablet    Bursitis of right shoulder    Relevant Medications    meloxicam (MOBIC) 7.5 MG tablet       Other    Rotator cuff injury, right, initial encounter    Relevant Medications    meloxicam (MOBIC) 7.5 MG tablet      Other Visit Diagnoses     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  Right shoulder counseled on chronic massive tearing of the supra and infraspinatus.  Did  treatment options which would include nonoperative, scope debridement, shoulder replacement.  If he did need shoulder surgery in the future most likely be more aligned with reverse shoulder arthroplasty.  I think he will continue to do well with conservative course.  Reading protocol for deltoid strengthening provided.  Patient and daughter seem pleased with a conservative course and I agree.  They will keep me updated pending progress possible injection of the future    Follow Up: They will keep me updated pending progress over the next 3 to 6 months      Leon Sotelo MD, FAAOS  Orthopedic Surgeon  Fellowship Trained Shoulder and Elbow Surgeon  [facility]  Orthopedics and Sports Medicine  [address] [place] [address], [address]  [place] [address]    08/09/22  11:40 EDT

## 2022-09-25 DIAGNOSIS — F41.1 GENERALIZED ANXIETY DISORDER: ICD-10-CM

## 2022-09-26 RX ORDER — ESCITALOPRAM OXALATE 20 MG/1
20 TABLET ORAL DAILY
Qty: 90 TABLET | Refills: 1 | Status: SHIPPED | OUTPATIENT
Start: 2022-09-26 | End: 2022-09-26 | Stop reason: SDUPTHER

## 2022-09-26 RX ORDER — ESCITALOPRAM OXALATE 10 MG/1
10 TABLET ORAL DAILY
Qty: 90 TABLET | Refills: 1 | Status: SHIPPED | OUTPATIENT
Start: 2022-09-26 | End: 2022-12-26 | Stop reason: SDUPTHER

## 2022-09-26 RX ORDER — ALPRAZOLAM 0.25 MG/1
0.25 TABLET ORAL DAILY
Qty: 30 TABLET | Refills: 2 | Status: SHIPPED | OUTPATIENT
Start: 2022-09-26 | End: 2022-12-26 | Stop reason: SDUPTHER

## 2022-09-26 NOTE — TELEPHONE ENCOUNTER
Daughter sent message in regards to Lexapro: Dad is doing so much better but he got mixed up and has been taking one of a morning and one of a night.  Could he get a prescription for that or just take a 10mg daily?     Script for 10 mg pended for approval.            Last filled:6/20/22  Days worth: 30  Refills: 2    Last appointment: 8/1/22  Next appointment: no f/u on file    Labs: UTD  UDS: 8/1/22  CSA: 5/24/22

## 2022-10-13 ENCOUNTER — OFFICE VISIT (OUTPATIENT)
Dept: INTERNAL MEDICINE | Facility: CLINIC | Age: 77
End: 2022-10-13

## 2022-10-13 VITALS
DIASTOLIC BLOOD PRESSURE: 74 MMHG | HEIGHT: 71 IN | HEART RATE: 64 BPM | SYSTOLIC BLOOD PRESSURE: 142 MMHG | BODY MASS INDEX: 32.06 KG/M2 | WEIGHT: 229 LBS | TEMPERATURE: 97.8 F | OXYGEN SATURATION: 97 %

## 2022-10-13 DIAGNOSIS — R55 NEAR SYNCOPE: ICD-10-CM

## 2022-10-13 DIAGNOSIS — I25.10 CORONARY ARTERY DISEASE INVOLVING NATIVE CORONARY ARTERY OF NATIVE HEART WITHOUT ANGINA PECTORIS: Primary | ICD-10-CM

## 2022-10-13 DIAGNOSIS — G60.9 IDIOPATHIC PERIPHERAL NEUROPATHY: ICD-10-CM

## 2022-10-13 PROBLEM — M75.51 BURSITIS OF RIGHT SHOULDER: Status: RESOLVED | Noted: 2022-05-27 | Resolved: 2022-10-13

## 2022-10-13 PROBLEM — M75.41 IMPINGEMENT SYNDROME OF RIGHT SHOULDER: Status: RESOLVED | Noted: 2022-05-27 | Resolved: 2022-10-13

## 2022-10-13 PROCEDURE — 99213 OFFICE O/P EST LOW 20 MIN: CPT | Performed by: INTERNAL MEDICINE

## 2022-10-13 PROCEDURE — 90662 IIV NO PRSV INCREASED AG IM: CPT | Performed by: INTERNAL MEDICINE

## 2022-10-13 PROCEDURE — G0008 ADMIN INFLUENZA VIRUS VAC: HCPCS | Performed by: INTERNAL MEDICINE

## 2022-10-13 RX ORDER — ROSUVASTATIN CALCIUM 20 MG/1
20 TABLET, COATED ORAL DAILY
Qty: 90 TABLET | Refills: 1 | Status: SHIPPED | OUTPATIENT
Start: 2022-10-13

## 2022-10-13 NOTE — PROGRESS NOTES
Chief Complaint   Patient presents with   • Abstract     Daughter states Pt's had 3 different episodes where he feels off balance, one where his BP dropped. Requesting a referral to a local Cardiologist.      Subjective   Tristan Hillman is a 77 y.o. male.     History of Present Illness  Patient is here today with complaints of feeling off balance.  Daughter states he has had 3 episodes of feeling off balance and at one time his blood pressure was low.  He sees a cardiologist at [facility] but would like to see a cardiologist here in town  He has known peripheral neuropathy and severe hearing impairment which are possible causes of his feeling off balance.  Blood pressure is slightly elevated today and was elevated in August at orthopedic doctor appointment.  A few weeks ago he was petting a dog while seated, and he started to fall back, his eyes rolled back, he tried to stand up, but he could not stand up-could not get his legs underneath him.  He was not completely alert, but a few minutes later his son allowed him to get up-he was able to walk and went to car.  Son allowed him to drive home and followed him, stated that he drove okay.   When he got home, he made lunch.  He appeared confused when wife found him, walking holding the counter, hunched over, looking confused.  He went to bed and when he awoke he did not remember events of the day, but wife said he looked back to his normal self.   His wife was cutting his hair last week, pt felt suddenly nauseous.  He became pale and BP was less than 100/50.  He was given some water and BP increased to 120/60s and he felt better.   Then he had he had another episode of feeling off balance when he went to the restroom one night last week.  He went back to bed but did not check blood pressure.  He just had his hearing aids repaired and can get new pair in February.   He has chronic peripheral neuropathy of feet.  Currently he says feet are not bothering him right now.  " Burning comes and goes and is always worse at night when it does occur.  He and his wife are now living with their daughter.  Daughter has noticed worsening of his memory over past 1 to 2 years.  Since they moved into her home she has notice change in memory more, however that may be that she is around him more often.       The following portions of the patient's history were reviewed and updated as appropriate: allergies, current medications, past family history, past medical history, past social history, past surgical history and problem list.    Review of Systems   HENT: Positive for hearing loss.    Cardiovascular: Negative for chest pain and palpitations.   Gastrointestinal: Positive for nausea.   Musculoskeletal: Positive for gait problem.   Neurological: Positive for weakness and numbness.        Burning and numbness in feet  Episode of near syncope   Psychiatric/Behavioral: Positive for confusion.       Objective   /74   Pulse 64   Temp 97.8 °F (36.6 °C)   Ht 180.3 cm (70.98\")   Wt 104 kg (229 lb)   SpO2 97%   BMI 31.96 kg/m²   Body mass index is 31.96 kg/m².  Physical Exam  Vitals and nursing note reviewed.   Constitutional:       General: He is not in acute distress.     Appearance: Normal appearance. He is well-developed. He is not ill-appearing.      Comments: Kind and pleasant , appears stated age and in NAD today   HENT:      Head: Normocephalic and atraumatic.      Right Ear: External ear normal.      Left Ear: External ear normal.      Ears:      Comments: Hearing aids in place  Eyes:      General:         Right eye: No discharge.         Left eye: No discharge.      Extraocular Movements: Extraocular movements intact.      Conjunctiva/sclera: Conjunctivae normal.   Neck:      Thyroid: No thyromegaly.   Cardiovascular:      Rate and Rhythm: Normal rate and regular rhythm.      Heart sounds: Normal heart sounds. No murmur heard.  Pulmonary:      Effort: Pulmonary effort is normal. No " respiratory distress.      Breath sounds: Normal breath sounds. No wheezing.   Abdominal:      Palpations: Abdomen is soft.      Tenderness: There is no abdominal tenderness.   Musculoskeletal:         General: Normal range of motion.      Cervical back: Normal range of motion and neck supple.      Right lower leg: Edema present.      Left lower leg: Edema present.      Comments: +1 edema in lower extremities bilaterally   Neurological:      Mental Status: He is alert and oriented to person, place, and time. Mental status is at baseline.      Cranial Nerves: No cranial nerve deficit.      Motor: No weakness.      Gait: Gait normal.   Psychiatric:         Mood and Affect: Mood normal.         Assessment & Plan   Tristan Hillman is here today and the following problems have been addressed:      Diagnoses and all orders for this visit:    1. Coronary artery disease involving native coronary artery of native heart without angina pectoris (Primary)  -     Ambulatory Referral to Cardiology    2. Idiopathic peripheral neuropathy  -     Ambulatory Referral to Neurology  -     TSH  -     Vitamin B12  -     Vitamin B1, Whole Blood  -     Zinc    3. Near syncope    Other orders  -     rosuvastatin (CRESTOR) 20 MG tablet; Take 1 tablet by mouth Daily.  Dispense: 90 tablet; Refill: 1  -     Fluzone High-Dose 65+yrs ([id number])    Labs as noted due to peripheral neuropathy  Daughter requests referral to different cardiologist locally and for evaluation of above symptoms, near syncope  Refer to neurologist for peripheral neuropathy and due to recent episode of near syncope  Flu vaccine provided today    Return to clinic in 2 months for follow-up of all medical problems     Please note that portions of this note were completed with a voice recognition program.  Efforts were made to edit dictation, but occasionally words are mistranscribed.

## 2022-10-15 LAB
TSH SERPL DL<=0.005 MIU/L-ACNC: 1.33 UIU/ML (ref 0.45–4.5)
VIT B1 BLD-SCNC: 166.8 NMOL/L (ref 66.5–200)
VIT B12 SERPL-MCNC: 475 PG/ML (ref 232–1245)
ZINC SERPL-MCNC: 65 UG/DL (ref 44–115)

## 2022-10-17 DIAGNOSIS — R55 NEAR SYNCOPE: Primary | ICD-10-CM

## 2022-10-17 NOTE — PROGRESS NOTES
Please let daughter know that thyroid function test and all vitamin levels checked are normal range.  I would like to get an ultrasound of his carotid arteries due to symptoms that he has been having.  I have ordered this and they will be contacted with appointment time.  I would like this to be done if possible prior to him seeing cardiology and neurology.  I am concerned that he needs to see cardiologist and neurologist for his near syncopal events.  We will await further work-up per cardiologist and neurologist.  Thank you

## 2022-10-22 DIAGNOSIS — S46.001A ROTATOR CUFF INJURY, RIGHT, INITIAL ENCOUNTER: ICD-10-CM

## 2022-10-22 DIAGNOSIS — M75.41 IMPINGEMENT SYNDROME OF RIGHT SHOULDER: ICD-10-CM

## 2022-10-22 DIAGNOSIS — M75.01 ADHESIVE CAPSULITIS OF RIGHT SHOULDER: ICD-10-CM

## 2022-10-22 DIAGNOSIS — M75.51 BURSITIS OF RIGHT SHOULDER: ICD-10-CM

## 2022-10-24 RX ORDER — MELOXICAM 7.5 MG/1
TABLET ORAL
Qty: 30 TABLET | Refills: 1 | Status: SHIPPED | OUTPATIENT
Start: 2022-10-24 | End: 2022-12-26 | Stop reason: SDUPTHER

## 2022-10-26 ENCOUNTER — HOSPITAL ENCOUNTER (OUTPATIENT)
Dept: ULTRASOUND IMAGING | Facility: HOSPITAL | Age: 77
Discharge: HOME | End: 2022-10-26
Admitting: INTERNAL MEDICINE

## 2022-10-26 DIAGNOSIS — R55 NEAR SYNCOPE: ICD-10-CM

## 2022-10-26 PROCEDURE — 93880 EXTRACRANIAL BILAT STUDY: CPT

## 2022-10-27 NOTE — PROGRESS NOTES
Please contact patient's daughter and let her know that he has no significant narrowing in the carotid arteries on either side.  Thank you hard copy, drawn during this pregnancy

## 2022-11-07 RX ORDER — HYDROCHLOROTHIAZIDE 12.5 MG/1
12.5 TABLET ORAL DAILY
Qty: 90 TABLET | Refills: 1 | Status: SHIPPED | OUTPATIENT
Start: 2022-11-07

## 2022-11-07 RX ORDER — LISINOPRIL 20 MG/1
20 TABLET ORAL 2 TIMES DAILY
Qty: 180 TABLET | Refills: 1 | Status: SHIPPED | OUTPATIENT
Start: 2022-11-07

## 2022-12-12 ENCOUNTER — OFFICE VISIT (OUTPATIENT)
Dept: CARDIOLOGY | Facility: CLINIC | Age: 77
End: 2022-12-12

## 2022-12-12 VITALS — HEIGHT: 71 IN | HEART RATE: 52 BPM | OXYGEN SATURATION: 96 % | WEIGHT: 230 LBS | BODY MASS INDEX: 32.2 KG/M2

## 2022-12-12 DIAGNOSIS — R00.1 BRADYCARDIA: ICD-10-CM

## 2022-12-12 DIAGNOSIS — R29.898 WEAKNESS OF BOTH LOWER EXTREMITIES: ICD-10-CM

## 2022-12-12 DIAGNOSIS — E78.5 DYSLIPIDEMIA: ICD-10-CM

## 2022-12-12 DIAGNOSIS — I48.0 PAROXYSMAL ATRIAL FIBRILLATION: ICD-10-CM

## 2022-12-12 DIAGNOSIS — I25.10 CORONARY ARTERY DISEASE INVOLVING NATIVE CORONARY ARTERY OF NATIVE HEART WITHOUT ANGINA PECTORIS: Primary | ICD-10-CM

## 2022-12-12 DIAGNOSIS — I10 PRIMARY HYPERTENSION: ICD-10-CM

## 2022-12-12 PROCEDURE — 99204 OFFICE O/P NEW MOD 45 MIN: CPT | Performed by: INTERNAL MEDICINE

## 2022-12-12 PROCEDURE — 93000 ELECTROCARDIOGRAM COMPLETE: CPT | Performed by: INTERNAL MEDICINE

## 2022-12-12 NOTE — PROGRESS NOTES
"    Subjective:     Encounter Date:12/12/2022      Patient ID: Tristan Hillman is a 77 y.o. male.    Chief Complaint: Coronary artery disease  HPI  This is a 77-year-old male patient who presents to cardiology clinic to establish care as it is no longer convenient for him to travel to [facility].  He was previously under the care of Dr. Elder at that location..  The patient indicates that he has done well from a cardiovascular perspective since his last clinic visit.  He has had no active cardiovascular issues, symptoms or hospitalizations.  He reports compliance with his medications with no perceived side effects.  He remains a lifelong non-smoker.  He has a history of complex coronary artery disease with a reported total of \"11 prior stents\".  His last heart catheterization was 2 years ago in which she was demonstrated to have patent stents throughout the LAD and circumflex territories.  He was also noted to have extensive stenting of the proximal mid and distal right coronary arteries.  At that time, the patient was demonstrated to have severe diffuse in-stent restenosis involving the right coronary artery and received an additional 3 overlapping drug-eluting stents.  The patient's problem list indicates a prior history of atrial fibrillation.  However, the patient does not recall ever being told that he had atrial fibrillation.  The patient indicates that many years ago he was told that he had a \"fast heart rate\".  The patient indicates that he underwent a radiofrequency ablation procedure performed from the right groin at the [facility].  These records are unavailable and neither the patient nor his family can recall the details.  He is not on anticoagulation therapy.  The patient indicates that none of his previous cardiologist ever informed him of the need or benefit from lifelong anticoagulation, presumably because this was not atrial fibrillation/atrial flutter.  The patient reports " "severe weakness in both lower extremities.  He is currently ambulating with difficulty using a cane.  The patient indicates that his primary care provider has sent a referral for evaluation for possible \"neuropathy\".  He does not recall ever having screening for peripheral arterial occlusive disease despite his known advanced, complex coronary artery disease.  He has no rest pain or tissue loss.  He does not appear to be experiencing intermittent claudication but primarily severe weakness throughout all large muscle groups in both lower extremities both proximally and distally.  The patient indicates that if he \"gets down in the floor\" that he does not have the strength to get back up.  The following portions of the patient's history were reviewed and updated as appropriate: allergies, current medications, past family history, past medical history, past social history, past surgical history and problem  Review of Systems   Constitutional: Negative for chills, diaphoresis, fever, malaise/fatigue, weight gain and weight loss.   HENT: Negative for ear discharge, hearing loss, hoarse voice and nosebleeds.    Eyes: Negative for discharge, double vision, pain and photophobia.   Cardiovascular: Negative for chest pain, claudication, cyanosis, dyspnea on exertion, irregular heartbeat, leg swelling, near-syncope, orthopnea, palpitations, paroxysmal nocturnal dyspnea and syncope.   Respiratory: Negative for cough, hemoptysis, shortness of breath, sputum production and wheezing.    Endocrine: Negative for cold intolerance, heat intolerance, polydipsia, polyphagia and polyuria.   Hematologic/Lymphatic: Negative for adenopathy and bleeding problem. Does not bruise/bleed easily.   Skin: Negative for color change, flushing, itching and rash.   Musculoskeletal: Negative for muscle cramps, muscle weakness, myalgias and stiffness.   Gastrointestinal: Negative for abdominal pain, diarrhea, hematemesis, hematochezia, nausea and " vomiting.   Genitourinary: Negative for dysuria, frequency and nocturia.   Neurological: Negative for focal weakness, loss of balance, numbness, paresthesias and seizures.   Psychiatric/Behavioral: Negative for altered mental status, hallucinations and suicidal ideas.   Allergic/Immunologic: Negative for HIV exposure, hives and persistent infections.           Current Outpatient Medications:   •  ALPRAZolam (XANAX) 0.25 MG tablet, Take 1 tablet by mouth Daily., Disp: 30 tablet, Rfl: 2  •  aspirin 81 MG tablet, Take 1 tablet by mouth Daily., Disp: 30 tablet, Rfl: 0  •  escitalopram (LEXAPRO) 10 MG tablet, Take 1 tablet by mouth Daily., Disp: 90 tablet, Rfl: 1  •  hydroCHLOROthiazide (HYDRODIURIL) 12.5 MG tablet, Take 1 tablet by mouth Daily., Disp: 90 tablet, Rfl: 1  •  lisinopril (PRINIVIL,ZESTRIL) 20 MG tablet, Take 1 tablet by mouth 2 (Two) Times a Day., Disp: 180 tablet, Rfl: 1  •  meloxicam (MOBIC) 7.5 MG tablet, TAKE 1 TABLET BY MOUTH DAILY WITH FOOD, Disp: 30 tablet, Rfl: 1  •  Multiple Vitamins-Minerals (ZINC PO), Take  by mouth., Disp: , Rfl:   •  nitroglycerin (NITROSTAT) 0.4 MG SL tablet, place 1 tablet under the tongue if needed every 5 minutes fo, Disp: 25 tablet, Rfl: 5  •  omeprazole (priLOSEC) 20 MG capsule, TAKE 1 CAPSULE BY MOUTH EVERY DAY, Disp: 90 capsule, Rfl: 3  •  rosuvastatin (CRESTOR) 20 MG tablet, Take 1 tablet by mouth Daily., Disp: 90 tablet, Rfl: 1    Objective:   Vitals and nursing note reviewed.   Constitutional:       Appearance: Healthy appearance. Not in distress.   Neck:      Vascular: No JVR. JVD normal.   Pulmonary:      Effort: Pulmonary effort is normal.      Breath sounds: Normal breath sounds. No wheezing. No rhonchi. No rales.   Chest:      Chest wall: Not tender to palpatation.   Cardiovascular:      PMI at left midclavicular line. Normal rate. Regular rhythm. Normal S1. Normal S2.      Murmurs: There is no murmur.      No gallop. No click. No rub.   Pulses:     Intact  "distal pulses.   Edema:     Peripheral edema absent.   Abdominal:      General: Bowel sounds are normal.      Palpations: Abdomen is soft.      Tenderness: There is no abdominal tenderness.   Musculoskeletal: Normal range of motion.         General: No tenderness. Skin:     General: Skin is warm and dry.   Neurological:      General: No focal deficit present.      Mental Status: Alert and oriented to person, place and time.       BP: 138/80, Pulse 52, height 180.3 cm (70.98\"), weight 104 kg (230 lb), SpO2 96 %.   Lab Review:     Assessment:       1. Coronary artery disease involving native coronary artery of native heart without angina pectoris  Stable and angina free.  - ECG 12 Lead    2. Bradycardia  Mild asymptomatic sinus bradycardia persists after \"weaning off\" beta-blocker therapy.  No other offending medications noted to slow heart rate.    3. Primary hypertension  Acceptable blood pressure control.    4. Dyslipidemia  Tolerating high intensity statin based cholesterol-lowering therapy without side effects.  The patient denies having myalgias.  He is experiencing diffuse bilateral lower extremity weakness in the large muscle groups equally affecting proximal and distal lower extremity, which does not appear suspicious for a statin based side effect.    5. Paroxysmal atrial fibrillation (HCC)  Unsubstantiated, probable erroneous diagnosis.  Historically the patient describes having a radiofrequency ablation procedure performed at the [facility] greater than 10 years ago without any available records for review.  This could have been for AVNRT which would not have had a requirement for anticoagulation therapy.  No evidence of clinical recurrence.    6. Weakness of both lower extremities  Potentially multifactorial.  The patient has been referred for possible neuropathy/myelopathy.  There is no associated pain or myalgias.  He does not have classic intermittent claudication.  There is no rest " pain or tissue loss.  He has never had an evaluation for possible peripheral arterial occlusive disease.  - Ambulatory Referral to General Surgery      ECG 12 Lead    Date/Time: 12/12/2022 2:23 PM  Performed by: Kishore Edouard MD  Authorized by: Kishore Edouard MD   Previous ECG: no previous ECG available  Rhythm: sinus bradycardia  Rate: bradycardic  BPM: 50  QRS axis: normal    Clinical impression: normal ECG            Plan:     Advance Care Planning   ACP discussion was held with the patient during this visit. Patient has an advance directive (not in EMR), copy requested.     I have recommended outpatient referral to vascular surgery for evaluation for the possibility of peripheral arterial occlusive disease causing his atypical lower extremity leg symptoms.  He is encouraged to also follow through with the recommended referral for neurology evaluation.  The patient indicates that the earliest appointment he could get with neurology was in June 2023.    No changes in medications have been made at today's visit.    No cardiac testing indicated at this time.

## 2022-12-26 DIAGNOSIS — M75.51 BURSITIS OF RIGHT SHOULDER: ICD-10-CM

## 2022-12-26 DIAGNOSIS — M75.01 ADHESIVE CAPSULITIS OF RIGHT SHOULDER: ICD-10-CM

## 2022-12-26 DIAGNOSIS — M75.41 IMPINGEMENT SYNDROME OF RIGHT SHOULDER: ICD-10-CM

## 2022-12-26 DIAGNOSIS — S46.001A ROTATOR CUFF INJURY, RIGHT, INITIAL ENCOUNTER: ICD-10-CM

## 2022-12-26 DIAGNOSIS — F41.1 GENERALIZED ANXIETY DISORDER: ICD-10-CM

## 2022-12-27 RX ORDER — ALPRAZOLAM 0.25 MG/1
0.25 TABLET ORAL DAILY
Qty: 30 TABLET | Refills: 0 | Status: SHIPPED | OUTPATIENT
Start: 2022-12-27 | End: 2023-02-27 | Stop reason: SDUPTHER

## 2022-12-27 RX ORDER — ESCITALOPRAM OXALATE 10 MG/1
10 TABLET ORAL DAILY
Qty: 90 TABLET | Refills: 1 | Status: SHIPPED | OUTPATIENT
Start: 2022-12-27

## 2022-12-27 NOTE — TELEPHONE ENCOUNTER
Rx Refill Note  Requested Prescriptions     Pending Prescriptions Disp Refills   • ALPRAZolam (XANAX) 0.25 MG tablet 30 tablet 2     Sig: Take 1 tablet by mouth Daily.   • escitalopram (LEXAPRO) 10 MG tablet 90 tablet 1     Sig: Take 1 tablet by mouth Daily.      Last office visit with prescribing clinician: 10/13/2022   Last telemedicine visit with prescribing clinician: Visit date not found   Next office visit with prescribing clinician: Visit date not found                         Would you like a call back once the refill request has been completed: [] Yes [] No    If the office needs to give you a call back, can they leave a voicemail: [] Yes [] No    Anahi Graves LPN  12/27/22, 16:28 EST

## 2023-01-03 RX ORDER — MELOXICAM 7.5 MG/1
TABLET ORAL
Qty: 30 TABLET | Refills: 1 | Status: SHIPPED | OUTPATIENT
Start: 2023-01-03 | End: 2023-03-07 | Stop reason: SDUPTHER

## 2023-01-26 RX ORDER — OMEPRAZOLE 20 MG/1
20 CAPSULE, DELAYED RELEASE ORAL DAILY
Qty: 90 CAPSULE | Refills: 0 | Status: SHIPPED | OUTPATIENT
Start: 2023-01-26

## 2023-02-27 DIAGNOSIS — F41.1 GENERALIZED ANXIETY DISORDER: ICD-10-CM

## 2023-02-28 RX ORDER — ALPRAZOLAM 0.25 MG/1
0.25 TABLET ORAL DAILY
Qty: 30 TABLET | Refills: 2 | Status: SHIPPED | OUTPATIENT
Start: 2023-02-28

## 2023-03-07 ENCOUNTER — HOSPITAL ENCOUNTER (OUTPATIENT)
Dept: GENERAL RADIOLOGY | Facility: HOSPITAL | Age: 78
Discharge: HOME | End: 2023-03-07
Admitting: INTERNAL MEDICINE
Payer: MEDICARE

## 2023-03-07 ENCOUNTER — OFFICE VISIT (OUTPATIENT)
Dept: INTERNAL MEDICINE | Facility: CLINIC | Age: 78
End: 2023-03-07
Payer: MEDICARE

## 2023-03-07 VITALS
BODY MASS INDEX: 31.64 KG/M2 | HEIGHT: 71 IN | SYSTOLIC BLOOD PRESSURE: 128 MMHG | TEMPERATURE: 97.7 F | HEART RATE: 59 BPM | DIASTOLIC BLOOD PRESSURE: 64 MMHG | OXYGEN SATURATION: 97 % | WEIGHT: 226 LBS

## 2023-03-07 DIAGNOSIS — M25.551 CHRONIC RIGHT HIP PAIN: ICD-10-CM

## 2023-03-07 DIAGNOSIS — G89.29 CHRONIC RIGHT HIP PAIN: ICD-10-CM

## 2023-03-07 DIAGNOSIS — G89.29 CHRONIC RIGHT HIP PAIN: Primary | ICD-10-CM

## 2023-03-07 DIAGNOSIS — M25.551 CHRONIC RIGHT HIP PAIN: Primary | ICD-10-CM

## 2023-03-07 PROCEDURE — 99213 OFFICE O/P EST LOW 20 MIN: CPT | Performed by: INTERNAL MEDICINE

## 2023-03-07 PROCEDURE — 73502 X-RAY EXAM HIP UNI 2-3 VIEWS: CPT

## 2023-03-07 RX ORDER — MELOXICAM 7.5 MG/1
TABLET ORAL
Qty: 90 TABLET | Refills: 1 | Status: SHIPPED | OUTPATIENT
Start: 2023-03-07 | End: 2023-03-07 | Stop reason: SDUPTHER

## 2023-03-07 RX ORDER — MELOXICAM 7.5 MG/1
TABLET ORAL
Qty: 90 TABLET | Refills: 1 | Status: SHIPPED | OUTPATIENT
Start: 2023-03-07

## 2023-03-07 NOTE — PROGRESS NOTES
"Chief Complaint   Patient presents with   • Abstract     Right hip pain.      Subjective   Tristan Hillman is a 77 y.o. male.     History of Present Illness  Here today with complaints of right hip pain.  Hip has been bothering him for 2 months.   He has been taking some tylenol for pain.  Also takes mobic and it helps some.   The pain is better with sitting or lying.  Worse with getting up from chair and walking.   He does get a catch on occasion.  No radiation of pain.   He has decreased his activity due to pain with ambulation.       The following portions of the patient's history were reviewed and updated as appropriate: allergies, current medications, past family history, past medical history, past social history, past surgical history and problem list.    Review of Systems   Constitutional: Positive for activity change.   Musculoskeletal: Positive for arthralgias and gait problem.       Objective   /64   Pulse 59   Temp 97.7 °F (36.5 °C)   Ht 180.3 cm (70.98\")   Wt 103 kg (226 lb)   SpO2 97%   BMI 31.54 kg/m²   Body mass index is 31.54 kg/m².  Physical Exam  Vitals and nursing note reviewed.   Constitutional:       General: He is not in acute distress.     Appearance: Normal appearance. He is not ill-appearing.      Comments: Pleasant elderly man, ambulates with walking stick   HENT:      Right Ear: External ear normal.      Left Ear: External ear normal.      Ears:      Comments: Hearing aids in place  Eyes:      General:         Right eye: No discharge.         Left eye: No discharge.      Extraocular Movements: Extraocular movements intact.   Pulmonary:      Effort: Pulmonary effort is normal. No respiratory distress.   Musculoskeletal:      Comments: Right hip with pain on flexion and internal rotation, pain with ambulation, he ambulates with use of a walking stick   Neurological:      Mental Status: He is alert and oriented to person, place, and time.      Motor: Weakness present.      Gait: Gait " abnormal.      Comments: Antalgic gait due to pain.  Patient left clinic today with use of a transport chair due to significant pain with ambulation   Psychiatric:         Mood and Affect: Mood normal.         Assessment & Plan   Tristan Hillman is here today and the following problems have been addressed:      Diagnoses and all orders for this visit:    1. Chronic right hip pain (Primary)  -     XR hip w or wo pelvis 2-3 view right; Future  -     Ambulatory Referral to Orthopedic Surgery    Other orders  -     Discontinue: meloxicam (MOBIC) 7.5 MG tablet; TAKE 1 TABLET BY MOUTH DAILY WITH FOOD  Dispense: 90 tablet; Refill: 1  -     meloxicam (MOBIC) 7.5 MG tablet; TAKE 1 TABLET BY MOUTH DAILY WITH FOOD  Dispense: 90 tablet; Refill: 1    X-ray right hip, encouraged patient and family to obtain copy to bring with him to orthopedic appointment  Refill of Mobic provided today, take 7.5 mg with food once daily  May supplement with Tylenol to help with pain issues  Referred to orthopedic surgeon for evaluation of chronic right hip pain, suspect severe osteoarthritis based on history and exam    Return to clinic as needed or as previously scheduled    Part of this note may be an electronic transcription/translation of spoken language to printed text using the Dragon Dictation System.    Answers for HPI/ROS submitted by the patient on 3/7/2023  What is the primary reason for your visit?: Other  Please describe your symptoms.: Hip pain  Have you had these symptoms before?: Yes  How long have you been having these symptoms?: Greater than 2 weeks  Please list any medications you are currently taking for this condition.: None  Please describe any probable cause for these symptoms. : This is his daughter, truly don’t know.  He doesn’t move/walk much and I feel its just locking up.  We really want to know if there is a medical reason. However, my aunt just had a hip replacement and thats all dad has talked about.

## 2023-03-07 NOTE — PROGRESS NOTES
Please let daughter or patient know that he has arthritis bilaterally with bone spurs.  He also has significant low back osteoarthritis with bone spurs.  Please tell them to bring x-ray report with them to orthopedics.  Follow through with plan of care discussed at clinic visit.

## 2023-03-31 ENCOUNTER — TELEPHONE (OUTPATIENT)
Dept: INTERNAL MEDICINE | Facility: CLINIC | Age: 78
End: 2023-03-31
Payer: MEDICARE

## 2023-03-31 NOTE — TELEPHONE ENCOUNTER
Caller: Kelly Hillman     Relationship: Self     Best call back number: [phone]     What form or medical record are you requesting: DR VERMEESCH OFF BOARD LETTER      Who is requesting this form or medical record from you: ARNULFO     How would you like to receive the form or medical records (pick-up, mail, fax): MAIL  If mail, what is the address:      [address] [place] [id number]

## 2023-04-10 ENCOUNTER — TELEPHONE (OUTPATIENT)
Dept: INTERNAL MEDICINE | Facility: CLINIC | Age: 78
End: 2023-04-10
Payer: MEDICARE

## 2023-04-10 RX ORDER — ROSUVASTATIN CALCIUM 20 MG/1
20 TABLET, COATED ORAL DAILY
Qty: 90 TABLET | Refills: 1 | Status: SHIPPED | OUTPATIENT
Start: 2023-04-10

## 2023-04-10 NOTE — TELEPHONE ENCOUNTER
Caller: Alayna Onofre    Relationship: Emergency Contact    Best call back number: [phone]    Requested Prescriptions:   Requested Prescriptions     Pending Prescriptions Disp Refills   • rosuvastatin (CRESTOR) 20 MG tablet 90 tablet 1     Sig: Take 1 tablet by mouth Daily.        Pharmacy where request should be sent: [id number] - [place], [place] - [address] [phone] [place] [phone]      Last office visit with prescribing clinician: 3/7/2023   Last telemedicine visit with prescribing clinician: 5/24/2023   Next office visit with prescribing clinician: Visit date not found     Additional details provided by patient: PATIENT WAS A PATIENT OF DR.VERMEESCH, AND WOULD LIKE TO MAKE  HIS NEW PCP, HOWEVER HE DOESN'T HAVE APPOINTMENTS UNTIL 5/24 AND PATIENT IS NEEDING A REFILL ON THIS MEDICATION PLEASE ADVISE      Does the patient have less than a 3 day supply:  [] Yes  [x] No    Would you like a call back once the refill request has been completed: [x] Yes [] No    If the office needs to give you a call back, can they leave a voicemail: [x] Yes [] No    Renny Bustamante Rep   04/10/23 08:59 EDT

## 2023-04-10 NOTE — TELEPHONE ENCOUNTER
Rx Refill Note  Requested Prescriptions     Pending Prescriptions Disp Refills   • rosuvastatin (CRESTOR) 20 MG tablet 90 tablet 1     Sig: Take 1 tablet by mouth Daily.      Last office visit with prescribing clinician: 3/7/2023   Last telemedicine visit with prescribing clinician: 5/24/2023   Next office visit with prescribing clinician: Visit date not found                         Would you like a call back once the refill request has been completed: [] Yes [] No    If the office needs to give you a call back, can they leave a voicemail: [] Yes [] No    Za Rosenberg LPN  04/10/23, 10:49 EDT

## 2023-05-02 RX ORDER — OMEPRAZOLE 20 MG/1
20 CAPSULE, DELAYED RELEASE ORAL DAILY
Qty: 90 CAPSULE | Refills: 0 | Status: SHIPPED | OUTPATIENT
Start: 2023-05-02

## 2023-05-02 RX ORDER — LISINOPRIL 20 MG/1
20 TABLET ORAL 2 TIMES DAILY
Qty: 180 TABLET | Refills: 1 | Status: SHIPPED | OUTPATIENT
Start: 2023-05-02

## 2023-05-02 NOTE — TELEPHONE ENCOUNTER
Caller: Alayna Onofre    Relationship: Emergency Contact    Best call back number: [phone]    Requested Prescriptions:   Requested Prescriptions     Pending Prescriptions Disp Refills   • omeprazole (priLOSEC) 20 MG capsule 90 capsule 0     Sig: Take 1 capsule by mouth Daily. MUST HAVE APPOINTMENT FOR FURTHER REFILLS.   • lisinopril (PRINIVIL,ZESTRIL) 20 MG tablet 180 tablet 1     Sig: Take 1 tablet by mouth 2 (Two) Times a Day.        Pharmacy where request should be sent: [id number] - [place], [place] - [address] [phone] [place] [phone] FX     Last office visit with prescribing clinician: Visit date not found   Last telemedicine visit with prescribing clinician: 5/24/2023   Next office visit with prescribing clinician: Visit date not found     Additional details provided by patient: PATIENT HAS 4 DAYS LEFT OF THIS MEDICATION.    Does the patient have less than a 3 day supply:  [] Yes  [x] No    Would you like a call back once the refill request has been completed: [] Yes [x] No    If the office needs to give you a call back, can they leave a voicemail: [] Yes [x] No    Renny Almodovar Rep   05/02/23 10:23 EDT

## 2023-05-08 ENCOUNTER — TELEPHONE (OUTPATIENT)
Dept: CARDIOLOGY | Facility: CLINIC | Age: 78
End: 2023-05-08
Payer: MEDICARE

## 2023-05-08 DIAGNOSIS — I48.0 PAROXYSMAL ATRIAL FIBRILLATION: Primary | ICD-10-CM

## 2023-05-08 NOTE — TELEPHONE ENCOUNTER
He actually has a hx of bradycardia (HR was 52 bpm at his last visit in 12/22, even after b-blocker was stopped).  He reportedly underwent an ablation at some point.  If he is asymptomatic, he can come in as a nurse only visit for a 7-day outpatient cardiac monitor study.    If he is having palpitations, SOB, significant fatigue, dizziness, or swelling of his legs, he should be seen in the office to make sure this isn't causing an acute problem now.    Advise his family of these options and let me know.

## 2023-05-08 NOTE — TELEPHONE ENCOUNTER
Caller: SAMMY VALENTINE    Relationship: DAUGHTER    Best call back number: [phone]    What is the best time to reach you: ANY    Who are you requesting to speak with (clinical staff, provider,  specific staff member): ANY      What was the call regarding: DAUGHTER CALLED IN SAYING HER DAD HEART RATE HAD BEEN GETTING UP -150 BUT IT DOES NOT CONSTANTLY STAY THERE. DAUGHTER SAYS HE DOES HAVE ANXIETY AND STRESSES A LOT AND ISN'T SURE IF IT HAS TO DO WITH THAT. PT IS JUST REALLY CONCERNED ABOUT IT.     Do you require a callback: YES

## 2023-05-08 NOTE — TELEPHONE ENCOUNTER
Spoke with daughter and she states that this has happened 3 times in the past week, its not an every day occurrence and the episodes do not last long. She's not for sure what he was doing at this time and the Pt isn't very active. Pts daughter also wanted to mention the Pt is very anxious and worries easily. Pts daughter states he denies dizziness that's out of the ordinary, SOA, CP that was abnormal for him.

## 2023-05-19 ENCOUNTER — TELEPHONE (OUTPATIENT)
Dept: CARDIOLOGY | Facility: CLINIC | Age: 78
End: 2023-05-19
Payer: MEDICARE

## 2023-05-19 RX ORDER — METOPROLOL SUCCINATE 25 MG/1
12.5 TABLET, EXTENDED RELEASE ORAL DAILY
Qty: 45 TABLET | Refills: 1 | Status: SHIPPED | OUTPATIENT
Start: 2023-05-19

## 2023-05-19 NOTE — TELEPHONE ENCOUNTER
Cardiac monitor study showed PAC's and some fast heartbeats.    Patient can start low-dose betablocker.  I have called in low-dose metoprolol for him to try.  His average heart rate was 82 but he did have some fast heartbeats that were in the 150s.  I only mention this because I know there has been some concern in the past about bradycardia.  His lowest heart rate on the monitor study was 41 and it looks like that happen 1 time at 7:30 in the morning where he might have been sleeping.    He can try this medication to see if it helps.  They are welcome to keep a home BP/heart rate log once in the morning and once in the afternoon about the same times every day.  Schedule him for follow-up with Dr. Edouard or myself in 2-4 weeks based on patient preference.

## 2023-05-24 ENCOUNTER — OFFICE VISIT (OUTPATIENT)
Dept: INTERNAL MEDICINE | Facility: CLINIC | Age: 78
End: 2023-05-24
Payer: MEDICARE

## 2023-05-24 VITALS
BODY MASS INDEX: 31.5 KG/M2 | TEMPERATURE: 97.2 F | WEIGHT: 225 LBS | OXYGEN SATURATION: 98 % | DIASTOLIC BLOOD PRESSURE: 66 MMHG | SYSTOLIC BLOOD PRESSURE: 124 MMHG | HEART RATE: 50 BPM | RESPIRATION RATE: 16 BRPM | HEIGHT: 71 IN

## 2023-05-24 DIAGNOSIS — M19.90 ARTHRITIS: Primary | ICD-10-CM

## 2023-05-24 DIAGNOSIS — R79.9 ABNORMAL FINDING OF BLOOD CHEMISTRY, UNSPECIFIED: ICD-10-CM

## 2023-05-24 DIAGNOSIS — G47.33 OSA (OBSTRUCTIVE SLEEP APNEA): ICD-10-CM

## 2023-05-24 DIAGNOSIS — R53.1 WEAKNESS: ICD-10-CM

## 2023-05-24 DIAGNOSIS — Z12.5 PROSTATE CANCER SCREENING: ICD-10-CM

## 2023-05-24 DIAGNOSIS — F41.1 GENERALIZED ANXIETY DISORDER: ICD-10-CM
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
## 2023-05-24 RX ORDER — ALPRAZOLAM 0.25 MG/1
0.25 TABLET ORAL DAILY
Qty: 30 TABLET | Refills: 2 | Status: CANCELLED | OUTPATIENT
Start: 2023-05-24

## 2023-05-24 RX ORDER — ROSUVASTATIN CALCIUM 5 MG/1
5 TABLET, COATED ORAL DAILY
Qty: 90 TABLET | Refills: 3 | Status: SHIPPED | OUTPATIENT
Start: 2023-05-24

## 2023-05-24 RX ORDER — HYDROCHLOROTHIAZIDE 12.5 MG/1
12.5 TABLET ORAL DAILY
Qty: 90 TABLET | Refills: 1 | Status: SHIPPED | OUTPATIENT
Start: 2023-05-24

## 2023-05-24 RX ORDER — DOXYCYCLINE HYCLATE 100 MG/1
100 CAPSULE ORAL 2 TIMES DAILY
Qty: 60 CAPSULE | Refills: 1 | Status: SHIPPED | OUTPATIENT
Start: 2023-05-24

## 2023-05-24 RX ORDER — ESCITALOPRAM OXALATE 10 MG/1
10 TABLET ORAL DAILY
Qty: 90 TABLET | Refills: 1 | Status: SHIPPED | OUTPATIENT
Start: 2023-05-24

## 2023-05-24 RX ORDER — HYDROXYZINE HYDROCHLORIDE 25 MG/1
25 TABLET, FILM COATED ORAL 3 TIMES DAILY PRN
Qty: 30 TABLET | Refills: 6 | Status: SHIPPED | OUTPATIENT
Start: 2023-05-24

## 2023-05-24 RX ORDER — MELOXICAM 7.5 MG/1
TABLET ORAL
Qty: 90 TABLET | Refills: 1 | Status: SHIPPED | OUTPATIENT
Start: 2023-05-24

## 2023-05-24 RX ORDER — OMEPRAZOLE 20 MG/1
20 CAPSULE, DELAYED RELEASE ORAL DAILY
Qty: 90 CAPSULE | Refills: 1 | Status: SHIPPED | OUTPATIENT
Start: 2023-05-24

## 2023-05-24 RX ORDER — LISINOPRIL 20 MG/1
20 TABLET ORAL 2 TIMES DAILY
Qty: 180 TABLET | Refills: 1 | Status: SHIPPED | OUTPATIENT
Start: 2023-05-24

## 2023-05-24 NOTE — PROGRESS NOTES
Subjective     Patient ID: Tristan Hillman is a 78 y.o. male. Patient is here for management of multiple medical problems.     Chief Complaint   Patient presents with   • Dizziness   • Hypertension     History of Present Illness   htn    Dizziness. Sitting is fine. Drives and rides in car fine. Getting out and dizzy.  Seen in [facility] ent. Fluid in ears.     Dizziness started in sept 2022 and weakness in legs. Started after covid.      Using a stick to walk with.           The following portions of the patient's history were reviewed and updated as appropriate: allergies, current medications, past family history, past medical history, past social history, past surgical history and problem list.    Review of Systems    Current Outpatient Medications:   •  ALPRAZolam (XANAX) 0.25 MG tablet, Take 1 tablet by mouth Daily., Disp: 30 tablet, Rfl: 2  •  aspirin 81 MG tablet, Take 1 tablet by mouth Daily., Disp: 30 tablet, Rfl: 0  •  escitalopram (LEXAPRO) 10 MG tablet, Take 1 tablet by mouth Daily., Disp: 90 tablet, Rfl: 1  •  hydroCHLOROthiazide (HYDRODIURIL) 12.5 MG tablet, Take 1 tablet by mouth Daily., Disp: 90 tablet, Rfl: 1  •  lisinopril (PRINIVIL,ZESTRIL) 20 MG tablet, Take 1 tablet by mouth 2 (Two) Times a Day., Disp: 180 tablet, Rfl: 1  •  meloxicam (MOBIC) 7.5 MG tablet, TAKE 1 TABLET BY MOUTH DAILY WITH FOOD, Disp: 90 tablet, Rfl: 1  •  metoprolol succinate XL (TOPROL-XL) 25 MG 24 hr tablet, Take 0.5 tablets by mouth Daily., Disp: 45 tablet, Rfl: 1  •  nitroglycerin (NITROSTAT) 0.4 MG SL tablet, place 1 tablet under the tongue if needed every 5 minutes fo, Disp: 25 tablet, Rfl: 5  •  omeprazole (priLOSEC) 20 MG capsule, Take 1 capsule by mouth Daily. MUST HAVE APPOINTMENT FOR FURTHER REFILLS., Disp: 90 capsule, Rfl: 1  •  rosuvastatin (CRESTOR) 5 MG tablet, Take 1 tablet by mouth Daily., Disp: 90 tablet, Rfl: 3  •  doxycycline (VIBRAMYCIN) 100 MG capsule, Take 1 capsule by mouth 2 (Two) Times a Day., Disp:  "60 capsule, Rfl: 1  •  hydrOXYzine (ATARAX) 25 MG tablet, Take 1 tablet by mouth 3 (Three) Times a Day As Needed for Anxiety., Disp: 30 tablet, Rfl: 6    Objective      Blood pressure 124/66, pulse 50, temperature 97.2 °F (36.2 °C), resp. rate 16, height 180.3 cm (70.98\"), weight 102 kg (225 lb), SpO2 98 %.    Physical Exam     General Appearance:    Alert, cooperative, no distress, appears stated age   Head:    Normocephalic, without obvious abnormality, atraumatic   Eyes:    PERRL, conjunctiva/corneas clear, EOM's intact   Ears:    Normal TM's and external ear canals, both ears   Nose:   Nares normal, septum midline, mucosa normal, no drainage   or sinus tenderness   Throat:   Lips, mucosa, and tongue normal; teeth and gums normal   Neck:   Supple, symmetrical, trachea midline, no adenopathy;        thyroid:  No enlargement/tenderness/nodules; no carotid    bruit or JVD   Back:     Symmetric, no curvature, ROM normal, no CVA tenderness   Lungs:     Clear to auscultation bilaterally, respirations unlabored   Chest wall:    No tenderness or deformity   Heart:    Regular rate and rhythm, S1 and S2 normal, no murmur,        rub or gallop   Abdomen:     Soft, non-tender, bowel sounds active all four quadrants,     no masses, no organomegaly   Extremities:   Extremities normal, atraumatic, no cyanosis or edema   Pulses:   2+ and symmetric all extremities   Skin:   Skin color, texture, turgor normal, no rashes or lesions   Lymph nodes:   Cervical, supraclavicular, and axillary nodes normal   Neurologic:   CNII-XII intact. Normal strength, sensation and reflexes       throughout      Results for orders placed or performed in visit on 05/09/23   Holter Monitor - 72 Hour Up To 15 Days   Result Value Ref Range    Target HR (85%) 122 bpm    Max. Pred. HR (100%) 144 bpm         Assessment & Plan     Mri with left mastoid itis.    Receptive aphagia since covid.    Waxes and wanes.    + arthritis  Lower back and finger.   Poor " memory.    Sleep till late in am.  Takes naps.      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 Very poor hearing in left ear.      htn    Dizziness. Sitting is fine. Drives and rides in car fine. Getting out and dizzy.  Seen in [facility] ent. Fluid in ears.     Dizziness started in sept 2022 and weakness in legs. Started after covid.      Using a stick to walk with.     lives in [place].  arthritis and memomry loss    Hx supports ashley.   Stopped smoking years ago. Pipe x 3 years in 1980.       Weakness. Suspect statin. Will reduce crestor to 5 mg.   Start co q 10 200 mg                  Diagnoses and all orders for this visit:    1. Arthritis (Primary)  -     CBC & Differential  -     Lipid Panel  -     Vitamin B12  -     Comprehensive Metabolic Panel  -     TSH  -     T4, Free  -     [place] Spotted Fever, IgM  -     [place] Spotted Fever, IgG  -     Lyme Disease, PCR - , Arm, Right  -     Ehrlichia Antibody Panel  -     Cyclic Citrul Peptide Antibody, IgG / IgA  -     Rheumatoid Factor  -     Sedimentation Rate  -     Uric Acid  -     C-reactive Protein  -     Antistreptolysin O Titer  -     Sjogren's Antibody, Anti-SS-A / -SS-B  -     CK  -     Myoglobin, Serum    2. Generalized anxiety disorder  -     CBC & Differential  -     Lipid Panel  -     Vitamin B12  -     Comprehensive Metabolic Panel  -     TSH  -     T4, Free  -     [place] Spotted Fever, IgM  -     [place] Spotted Fever, IgG  -     Lyme Disease, PCR - , Arm, Right  -     Ehrlichia Antibody Panel  -     Cyclic Citrul Peptide Antibody, IgG / IgA  -     Rheumatoid Factor  -     Sedimentation Rate  -     Uric Acid  -     C-reactive Protein  -     Antistreptolysin O Titer  -     Sjogren's Antibody, Anti-SS-A / -SS-B  -     CK  -     Myoglobin, Serum    3. Weakness  -     CBC & Differential  -     Lipid Panel  -     Vitamin B12  -     Comprehensive Metabolic Panel  -     TSH  -     T4, Free  -     [place] Spotted Fever, IgM  -     [place] Spotted  Fever, IgG  -     Lyme Disease, PCR - , Arm, Right  -     Ehrlichia Antibody Panel  -     Cyclic Citrul Peptide Antibody, IgG / IgA  -     Rheumatoid Factor  -     Sedimentation Rate  -     Uric Acid  -     C-reactive Protein  -     Antistreptolysin O Titer  -     Sjogren's Antibody, Anti-SS-A / -SS-B  -     CK  -     Myoglobin, Serum    4. LIZETTE (obstructive sleep apnea)  -     CBC & Differential  -     Lipid Panel  -     Vitamin B12  -     Comprehensive Metabolic Panel  -     TSH  -     T4, Free  -     [place] Spotted Fever, IgM  -     [place] Spotted Fever, IgG  -     Lyme Disease, PCR - , Arm, Right  -     Ehrlichia Antibody Panel  -     Cyclic Citrul Peptide Antibody, IgG / IgA  -     Rheumatoid Factor  -     Sedimentation Rate  -     Uric Acid  -     C-reactive Protein  -     Antistreptolysin O Titer  -     Sjogren's Antibody, Anti-SS-A / -SS-B  -     CK  -     Myoglobin, Serum    5. Prostate cancer screening  -     PSA Screen    6. Abnormal finding of blood chemistry, unspecified  -     Lipid Panel    7. Bug bite, initial encounter    Other orders  -     escitalopram (LEXAPRO) 10 MG tablet; Take 1 tablet by mouth Daily.  Dispense: 90 tablet; Refill: 1  -     hydroCHLOROthiazide (HYDRODIURIL) 12.5 MG tablet; Take 1 tablet by mouth Daily.  Dispense: 90 tablet; Refill: 1  -     omeprazole (priLOSEC) 20 MG capsule; Take 1 capsule by mouth Daily. MUST HAVE APPOINTMENT FOR FURTHER REFILLS.  Dispense: 90 capsule; Refill: 1  -     meloxicam (MOBIC) 7.5 MG tablet; TAKE 1 TABLET BY MOUTH DAILY WITH FOOD  Dispense: 90 tablet; Refill: 1  -     lisinopril (PRINIVIL,ZESTRIL) 20 MG tablet; Take 1 tablet by mouth 2 (Two) Times a Day.  Dispense: 180 tablet; Refill: 1  -     rosuvastatin (CRESTOR) 5 MG tablet; Take 1 tablet by mouth Daily.  Dispense: 90 tablet; Refill: 3  -     doxycycline (VIBRAMYCIN) 100 MG capsule; Take 1 capsule by mouth 2 (Two) Times a Day.  Dispense: 60 capsule; Refill: 1  -     hydrOXYzine  (ATARAX) 25 MG tablet; Take 1 tablet by mouth 3 (Three) Times a Day As Needed for Anxiety.  Dispense: 30 tablet; Refill: 6      No follow-ups on file.  melatonin for sleep.            There are no Patient Instructions on file for this visit.     Taz Garcia MD    Assessment & Plan

## 2023-05-27 LAB
A PHAGOCYTOPH IGG TITR SER IF: NORMAL {TITER}
A PHAGOCYTOPH IGM TITR SER IF: NORMAL {TITER}
ALBUMIN SERPL-MCNC: 4.3 G/DL (ref 3.7–4.7)
ALBUMIN/GLOB SERPL: 2 {RATIO} (ref 1.2–2.2)
ALP SERPL-CCNC: 61 IU/L (ref 44–121)
ALT SERPL-CCNC: 20 IU/L (ref 0–44)
ASO AB SERPL-ACNC: 21.5 IU/ML (ref 0–200)
AST SERPL-CCNC: 23 IU/L (ref 0–40)
B BURGDOR DNA SPEC QL NAA+PROBE: NORMAL
BASOPHILS # BLD AUTO: 0.1 X10E3/UL (ref 0–0.2)
BASOPHILS NFR BLD AUTO: 1 %
BILIRUB SERPL-MCNC: 0.3 MG/DL (ref 0–1.2)
BUN SERPL-MCNC: 17 MG/DL (ref 8–27)
BUN/CREAT SERPL: 18 (ref 10–24)
CALCIUM SERPL-MCNC: 9.6 MG/DL (ref 8.6–10.2)
CCP IGA+IGG SERPL IA-ACNC: 3 UNITS (ref 0–19)
CHLORIDE SERPL-SCNC: 104 MMOL/L (ref 96–106)
CHOLEST SERPL-MCNC: 139 MG/DL (ref 100–199)
CK SERPL-CCNC: 52 U/L (ref 41–331)
CO2 SERPL-SCNC: 24 MMOL/L (ref 20–29)
CREAT SERPL-MCNC: 0.94 MG/DL (ref 0.76–1.27)
CRP SERPL-MCNC: <1 MG/L (ref 0–10)
E CHAFFEENSIS IGG TITR SER IF: NORMAL {TITER}
E CHAFFEENSIS IGM TITR SER IF: NORMAL {TITER}
EGFRCR SERPLBLD CKD-EPI 2021: 84 ML/MIN/1.73
ENA SS-A AB SER-ACNC: 0.3 AI (ref 0–0.9)
ENA SS-B AB SER-ACNC: <0.2 AI (ref 0–0.9)
EOSINOPHIL # BLD AUTO: 0.4 X10E3/UL (ref 0–0.4)
EOSINOPHIL NFR BLD AUTO: 5 %
ERYTHROCYTE [DISTWIDTH] IN BLOOD BY AUTOMATED COUNT: 12.5 % (ref 11.6–15.4)
ERYTHROCYTE [SEDIMENTATION RATE] IN BLOOD BY WESTERGREN METHOD: 16 MM/HR (ref 0–30)
GLOBULIN SER CALC-MCNC: 2.1 G/DL (ref 1.5–4.5)
GLUCOSE SERPL-MCNC: 66 MG/DL (ref 70–99)
HCT VFR BLD AUTO: 43.8 % (ref 37.5–51)
HDLC SERPL-MCNC: 37 MG/DL
HGB BLD-MCNC: 15 G/DL (ref 13–17.7)
IMM GRANULOCYTES # BLD AUTO: 0 X10E3/UL (ref 0–0.1)
IMM GRANULOCYTES NFR BLD AUTO: 0 %
LDLC SERPL CALC-MCNC: 72 MG/DL (ref 0–99)
LYMPHOCYTES # BLD AUTO: 1.8 X10E3/UL (ref 0.7–3.1)
LYMPHOCYTES NFR BLD AUTO: 24 %
MCH RBC QN AUTO: 31.9 PG (ref 26.6–33)
MCHC RBC AUTO-ENTMCNC: 34.2 G/DL (ref 31.5–35.7)
MCV RBC AUTO: 93 FL (ref 79–97)
MONOCYTES # BLD AUTO: 1 X10E3/UL (ref 0.1–0.9)
MONOCYTES NFR BLD AUTO: 12 %
MYOGLOBIN SERPL-MCNC: 31 NG/ML (ref 28–72)
NEUTROPHILS # BLD AUTO: 4.4 X10E3/UL (ref 1.4–7)
NEUTROPHILS NFR BLD AUTO: 58 %
PLATELET # BLD AUTO: 180 X10E3/UL (ref 150–450)
POTASSIUM SERPL-SCNC: 4.4 MMOL/L (ref 3.5–5.2)
PROT SERPL-MCNC: 6.4 G/DL (ref 6–8.5)
PSA SERPL-MCNC: 1.1 NG/ML (ref 0–4)
R RICKETTSI IGG SER QL IA: NORMAL
R RICKETTSI IGG TITR SER IF: ABNORMAL {TITER}
R RICKETTSI IGM SER-ACNC: 0.48 INDEX (ref 0–0.89)
RBC # BLD AUTO: 4.7 X10E6/UL (ref 4.14–5.8)
RHEUMATOID FACT SERPL-ACNC: <10 IU/ML
SODIUM SERPL-SCNC: 141 MMOL/L (ref 134–144)
T4 FREE SERPL-MCNC: 1.07 NG/DL (ref 0.82–1.77)
TRIGL SERPL-MCNC: 174 MG/DL (ref 0–149)
TSH SERPL DL<=0.005 MIU/L-ACNC: 1.27 UIU/ML (ref 0.45–4.5)
URATE SERPL-MCNC: 6.2 MG/DL (ref 3.8–8.4)
VIT B12 SERPL-MCNC: 699 PG/ML (ref 232–1245)
VLDLC SERPL CALC-MCNC: 30 MG/DL (ref 5–40)
WBC # BLD AUTO: 7.8 X10E3/UL (ref 3.4–10.8)

## 2023-05-31 LAB
A PHAGOCYTOPH IGG TITR SER IF: NEGATIVE {TITER}
A PHAGOCYTOPH IGM TITR SER IF: NEGATIVE {TITER}
ALBUMIN SERPL-MCNC: 4.3 G/DL (ref 3.7–4.7)
ALBUMIN/GLOB SERPL: 2 {RATIO} (ref 1.2–2.2)
ALP SERPL-CCNC: 61 IU/L (ref 44–121)
ALT SERPL-CCNC: 20 IU/L (ref 0–44)
ASO AB SERPL-ACNC: 21.5 IU/ML (ref 0–200)
AST SERPL-CCNC: 23 IU/L (ref 0–40)
B BURGDOR DNA SPEC QL NAA+PROBE: NEGATIVE
BASOPHILS # BLD AUTO: 0.1 X10E3/UL (ref 0–0.2)
BASOPHILS NFR BLD AUTO: 1 %
BILIRUB SERPL-MCNC: 0.3 MG/DL (ref 0–1.2)
BUN SERPL-MCNC: 17 MG/DL (ref 8–27)
BUN/CREAT SERPL: 18 (ref 10–24)
CALCIUM SERPL-MCNC: 9.6 MG/DL (ref 8.6–10.2)
CCP IGA+IGG SERPL IA-ACNC: 3 UNITS (ref 0–19)
CHLORIDE SERPL-SCNC: 104 MMOL/L (ref 96–106)
CHOLEST SERPL-MCNC: 139 MG/DL (ref 100–199)
CK SERPL-CCNC: 52 U/L (ref 41–331)
CO2 SERPL-SCNC: 24 MMOL/L (ref 20–29)
CREAT SERPL-MCNC: 0.94 MG/DL (ref 0.76–1.27)
CRP SERPL-MCNC: <1 MG/L (ref 0–10)
E CHAFFEENSIS IGG TITR SER IF: NEGATIVE {TITER}
E CHAFFEENSIS IGM TITR SER IF: NEGATIVE {TITER}
EGFRCR SERPLBLD CKD-EPI 2021: 84 ML/MIN/1.73
ENA SS-A AB SER-ACNC: 0.3 AI (ref 0–0.9)
ENA SS-B AB SER-ACNC: <0.2 AI (ref 0–0.9)
EOSINOPHIL # BLD AUTO: 0.4 X10E3/UL (ref 0–0.4)
EOSINOPHIL NFR BLD AUTO: 5 %
ERYTHROCYTE [DISTWIDTH] IN BLOOD BY AUTOMATED COUNT: 12.5 % (ref 11.6–15.4)
ERYTHROCYTE [SEDIMENTATION RATE] IN BLOOD BY WESTERGREN METHOD: 16 MM/HR (ref 0–30)
GLOBULIN SER CALC-MCNC: 2.1 G/DL (ref 1.5–4.5)
GLUCOSE SERPL-MCNC: 66 MG/DL (ref 70–99)
HCT VFR BLD AUTO: 43.8 % (ref 37.5–51)
HDLC SERPL-MCNC: 37 MG/DL
HGB BLD-MCNC: 15 G/DL (ref 13–17.7)
IMM GRANULOCYTES # BLD AUTO: 0 X10E3/UL (ref 0–0.1)
IMM GRANULOCYTES NFR BLD AUTO: 0 %
LDLC SERPL CALC-MCNC: 72 MG/DL (ref 0–99)
LYMPHOCYTES # BLD AUTO: 1.8 X10E3/UL (ref 0.7–3.1)
LYMPHOCYTES NFR BLD AUTO: 24 %
MCH RBC QN AUTO: 31.9 PG (ref 26.6–33)
MCHC RBC AUTO-ENTMCNC: 34.2 G/DL (ref 31.5–35.7)
MCV RBC AUTO: 93 FL (ref 79–97)
MONOCYTES # BLD AUTO: 1 X10E3/UL (ref 0.1–0.9)
MONOCYTES NFR BLD AUTO: 12 %
MYOGLOBIN SERPL-MCNC: 31 NG/ML (ref 28–72)
NEUTROPHILS # BLD AUTO: 4.4 X10E3/UL (ref 1.4–7)
NEUTROPHILS NFR BLD AUTO: 58 %
PLATELET # BLD AUTO: 180 X10E3/UL (ref 150–450)
POTASSIUM SERPL-SCNC: 4.4 MMOL/L (ref 3.5–5.2)
PROT SERPL-MCNC: 6.4 G/DL (ref 6–8.5)
PSA SERPL-MCNC: 1.1 NG/ML (ref 0–4)
R RICKETTSI IGG SER QL IA: NORMAL
R RICKETTSI IGG TITR SER IF: ABNORMAL {TITER}
R RICKETTSI IGM SER-ACNC: 0.48 INDEX (ref 0–0.89)
RBC # BLD AUTO: 4.7 X10E6/UL (ref 4.14–5.8)
RHEUMATOID FACT SERPL-ACNC: <10 IU/ML
SODIUM SERPL-SCNC: 141 MMOL/L (ref 134–144)
T4 FREE SERPL-MCNC: 1.07 NG/DL (ref 0.82–1.77)
TRIGL SERPL-MCNC: 174 MG/DL (ref 0–149)
TSH SERPL DL<=0.005 MIU/L-ACNC: 1.27 UIU/ML (ref 0.45–4.5)
URATE SERPL-MCNC: 6.2 MG/DL (ref 3.8–8.4)
VIT B12 SERPL-MCNC: 699 PG/ML (ref 232–1245)
VLDLC SERPL CALC-MCNC: 30 MG/DL (ref 5–40)
WBC # BLD AUTO: 7.8 X10E3/UL (ref 3.4–10.8)

## 2023-06-08 ENCOUNTER — OFFICE VISIT (OUTPATIENT)
Dept: INTERNAL MEDICINE | Facility: CLINIC | Age: 78
End: 2023-06-08
Payer: MEDICARE

## 2023-06-08 VITALS
WEIGHT: 225 LBS | RESPIRATION RATE: 16 BRPM | OXYGEN SATURATION: 98 % | BODY MASS INDEX: 31.5 KG/M2 | HEART RATE: 48 BPM | DIASTOLIC BLOOD PRESSURE: 70 MMHG | HEIGHT: 71 IN | SYSTOLIC BLOOD PRESSURE: 120 MMHG | TEMPERATURE: 97.8 F

## 2023-06-08 DIAGNOSIS — G47.33 OSA (OBSTRUCTIVE SLEEP APNEA): Primary | ICD-10-CM

## 2023-06-08 DIAGNOSIS — A77.0 RMSF (ROCKY MOUNTAIN SPOTTED FEVER): ICD-10-CM

## 2023-06-08 PROCEDURE — 3078F DIAST BP <80 MM HG: CPT | Performed by: INTERNAL MEDICINE

## 2023-06-08 PROCEDURE — 3074F SYST BP LT 130 MM HG: CPT | Performed by: INTERNAL MEDICINE

## 2023-06-08 PROCEDURE — 99214 OFFICE O/P EST MOD 30 MIN: CPT | Performed by: INTERNAL MEDICINE

## 2023-06-08 NOTE — PROGRESS NOTES
"Subjective     Patient ID: Tristan Hillman is a 78 y.o. male. Patient is here for management of multiple medical problems.     Chief Complaint   Patient presents with    Arthritis    Insomnia     History of Present Illness     Arthrits      Insomnia    Years of jumpiness.       The following portions of the patient's history were reviewed and updated as appropriate: allergies, current medications, past family history, past medical history, past social history, past surgical history and problem list.    Review of Systems    Current Outpatient Medications:     aspirin 81 MG tablet, Take 1 tablet by mouth Daily., Disp: 30 tablet, Rfl: 0    doxycycline (VIBRAMYCIN) 100 MG capsule, Take 1 capsule by mouth 2 (Two) Times a Day., Disp: 60 capsule, Rfl: 1    escitalopram (LEXAPRO) 10 MG tablet, Take 1 tablet by mouth Daily., Disp: 90 tablet, Rfl: 1    hydroCHLOROthiazide (HYDRODIURIL) 12.5 MG tablet, Take 1 tablet by mouth Daily., Disp: 90 tablet, Rfl: 1    hydrOXYzine (ATARAX) 25 MG tablet, Take 1 tablet by mouth 3 (Three) Times a Day As Needed for Anxiety., Disp: 30 tablet, Rfl: 6    lisinopril (PRINIVIL,ZESTRIL) 20 MG tablet, Take 1 tablet by mouth 2 (Two) Times a Day., Disp: 180 tablet, Rfl: 1    metoprolol succinate XL (TOPROL-XL) 25 MG 24 hr tablet, Take 0.5 tablets by mouth Daily., Disp: 45 tablet, Rfl: 1    nitroglycerin (NITROSTAT) 0.4 MG SL tablet, place 1 tablet under the tongue if needed every 5 minutes fo, Disp: 25 tablet, Rfl: 5    omeprazole (priLOSEC) 20 MG capsule, Take 1 capsule by mouth Daily. MUST HAVE APPOINTMENT FOR FURTHER REFILLS., Disp: 90 capsule, Rfl: 1    rosuvastatin (CRESTOR) 5 MG tablet, Take 1 tablet by mouth Daily., Disp: 90 tablet, Rfl: 3    Objective      Blood pressure 120/70, pulse (!) 48, temperature 97.8 °F (36.6 °C), resp. rate 16, height 180.3 cm (70.98\"), weight 102 kg (225 lb), SpO2 98 %.    Physical Exam     General Appearance:    Alert, cooperative, no distress, appears stated age "   Head:    Normocephalic, without obvious abnormality, atraumatic   Eyes:    PERRL, conjunctiva/corneas clear, EOM's intact   Ears:    Normal TM's and external ear canals, both ears   Nose:   Nares normal, septum midline, mucosa normal, no drainage   or sinus tenderness   Throat:   Lips, mucosa, and tongue normal; teeth and gums normal   Neck:   Supple, symmetrical, trachea midline, no adenopathy;        thyroid:  No enlargement/tenderness/nodules; no carotid    bruit or JVD   Back:     Symmetric, no curvature, ROM normal, no CVA tenderness   Lungs:     Clear to auscultation bilaterally, respirations unlabored   Chest wall:    No tenderness or deformity   Heart:    Regular rate and rhythm, S1 and S2 normal, no murmur,        rub or gallop   Abdomen:     Soft, non-tender, bowel sounds active all four quadrants,     no masses, no organomegaly   Extremities:   Extremities normal, atraumatic, no cyanosis or edema   Pulses:   2+ and symmetric all extremities   Skin:   Skin color, texture, turgor normal, no rashes or lesions   Lymph nodes:   Cervical, supraclavicular, and axillary nodes normal   Neurologic:   CNII-XII intact. Normal strength, sensation and reflexes       throughout      Results for orders placed or performed in visit on 05/24/23   Lyme Disease, PCR - , Arm, Right    Specimen: Arm, Right   Result Value Ref Range    Lyme Disease(B.burgdorferi)PCR Negative Negative   Lipid Panel    Specimen: Blood   Result Value Ref Range    Total Cholesterol 139 100 - 199 mg/dL    Triglycerides 174 (H) 0 - 149 mg/dL    HDL Cholesterol 37 (L) >39 mg/dL    VLDL Cholesterol Rajesh 30 5 - 40 mg/dL    LDL Chol Calc (NIH) 72 0 - 99 mg/dL   PSA Screen    Specimen: Blood   Result Value Ref Range    PSA 1.1 0.0 - 4.0 ng/mL   Vitamin B12    Specimen: Blood   Result Value Ref Range    Vitamin B-12 699 232 - 1,245 pg/mL   Comprehensive Metabolic Panel    Specimen: Blood   Result Value Ref Range    Glucose 66 (L) 70 - 99 mg/dL    BUN 17 8  - 27 mg/dL    Creatinine 0.94 0.76 - 1.27 mg/dL    EGFR Result 84 >59 mL/min/1.73    BUN/Creatinine Ratio 18 10 - 24    Sodium 141 134 - 144 mmol/L    Potassium 4.4 3.5 - 5.2 mmol/L    Chloride 104 96 - 106 mmol/L    Total CO2 24 20 - 29 mmol/L    Calcium 9.6 8.6 - 10.2 mg/dL    Total Protein 6.4 6.0 - 8.5 g/dL    Albumin 4.3 3.7 - 4.7 g/dL    Globulin 2.1 1.5 - 4.5 g/dL    A/G Ratio 2.0 1.2 - 2.2    Total Bilirubin 0.3 0.0 - 1.2 mg/dL    Alkaline Phosphatase 61 44 - 121 IU/L    AST (SGOT) 23 0 - 40 IU/L    ALT (SGPT) 20 0 - 44 IU/L   TSH    Specimen: Blood   Result Value Ref Range    TSH 1.270 0.450 - 4.500 uIU/mL   T4, Free    Specimen: Blood   Result Value Ref Range    Free T4 1.07 0.82 - 1.77 ng/dL   [place] Spotted Fever, IgM    Specimen: Blood   Result Value Ref Range    RMSF IgM 0.48 0.00 - 0.89 index   [place] Spotted Fever, IgG    Specimen: Blood   Result Value Ref Range    RMSF IgG Equivocal Negative   Ehrlichia Antibody Panel    Specimen: Blood   Result Value Ref Range    E. chaffeensis (HME) IgG Titer Negative Neg:<1:64    E. chaffeensis (HME) IgM Titer Negative Neg:<1:20    HGE IgG Titer Negative Neg:<1:64    HGE IgM Titer Negative Neg:<1:20   Cyclic Citrul Peptide Antibody, IgG / IgA    Specimen: Blood   Result Value Ref Range    CCP Antibodies IgG/IgA 3 0 - 19 units   Rheumatoid Factor    Specimen: Blood   Result Value Ref Range    RA Latex Turbid <10.0 <14.0 IU/mL   Sedimentation Rate    Specimen: Blood   Result Value Ref Range    Sed Rate 16 0 - 30 mm/hr   Uric Acid    Specimen: Blood   Result Value Ref Range    Uric Acid 6.2 3.8 - 8.4 mg/dL   C-reactive Protein    Specimen: Blood   Result Value Ref Range    C-Reactive Protein <1 0 - 10 mg/L   Antistreptolysin O Titer    Specimen: Blood   Result Value Ref Range    ASO 21.5 0.0 - 200.0 IU/mL   Sjogren's Antibody, Anti-SS-A / -SS-B    Specimen: Blood   Result Value Ref Range    Sjogren's Anti-SS-A 0.3 0.0 - 0.9 AI    Sjogren's Anti-SS-B <0.2  0.0 - 0.9 AI   CK    Specimen: Blood   Result Value Ref Range    Creatine Kinase 52 41 - 331 U/L   Myoglobin, Serum    Specimen: Blood   Result Value Ref Range    Myoglobin 31 28 - 72 ng/mL   CBC & Differential    Specimen: Blood   Result Value Ref Range    WBC 7.8 3.4 - 10.8 x10E3/uL    RBC 4.70 4.14 - 5.80 x10E6/uL    Hemoglobin 15.0 13.0 - 17.7 g/dL    Hematocrit 43.8 37.5 - 51.0 %    MCV 93 79 - 97 fL    MCH 31.9 26.6 - 33.0 pg    MCHC 34.2 31.5 - 35.7 g/dL    RDW 12.5 11.6 - 15.4 %    Platelets 180 150 - 450 x10E3/uL    Neutrophil Rel % 58 Not Estab. %    Lymphocyte Rel % 24 Not Estab. %    Monocyte Rel % 12 Not Estab. %    Eosinophil Rel % 5 Not Estab. %    Basophil Rel % 1 Not Estab. %    Neutrophils Absolute 4.4 1.4 - 7.0 x10E3/uL    Lymphocytes Absolute 1.8 0.7 - 3.1 x10E3/uL    Monocytes Absolute 1.0 (H) 0.1 - 0.9 x10E3/uL    Eosinophils Absolute 0.4 0.0 - 0.4 x10E3/uL    Basophils Absolute 0.1 0.0 - 0.2 x10E3/uL    Immature Granulocyte Rel % 0 Not Estab. %    Immature Grans Absolute 0.0 0.0 - 0.1 x10E3/uL   Reflexed RMSF, IgG, IFA   Result Value Ref Range    RMSF IgG 1:128 (H) Neg <1:64         Assessment & Plan   Started doxy 2 weeks ago. Symptoms starting to improving. Arthritis impoved. On arthritis tylenol 4 x a day and no longer needing.            Diagnoses and all orders for this visit:    1. LIZETTE (obstructive sleep apnea) (Primary)  -     Home Sleep Study    2. RMSF ([place] spotted fever)      Return in about 8 weeks (around 8/3/2023).          There are no Patient Instructions on file for this visit.     Taz Garcia MD    Assessment & Plan     Answers submitted by the patient for this visit:  Primary Reason for Visit (Submitted on 6/7/2023)  What is the primary reason for your visit?: Other  Other (Submitted on 6/7/2023)  Please describe your symptoms.: Follow up from bloodwork that should some elevated levels.  Have you had these symptoms before?: No  How long have you been having  these symptoms?: Greater than 2 weeks

## 2023-06-28 PROBLEM — I48.92 ATRIAL FLUTTER WITH RAPID VENTRICULAR RESPONSE: Status: ACTIVE | Noted: 2023-06-28

## 2023-06-28 PROBLEM — I70.213 ATHEROSCLEROSIS OF NATIVE ARTERIES OF EXTREMITIES WITH INTERMITTENT CLAUDICATION, BILATERAL LEGS: Status: ACTIVE | Noted: 2023-06-28

## 2023-08-03 RX ORDER — OMEPRAZOLE 20 MG/1
20 CAPSULE, DELAYED RELEASE ORAL DAILY
Qty: 90 CAPSULE | Refills: 0 | Status: SHIPPED | OUTPATIENT
Start: 2023-08-03 | End: 2023-08-07 | Stop reason: SDUPTHER

## 2023-08-07 ENCOUNTER — HOSPITAL ENCOUNTER (OUTPATIENT)
Dept: GENERAL RADIOLOGY | Facility: HOSPITAL | Age: 78
Discharge: HOME | End: 2023-08-07
Admitting: INTERNAL MEDICINE
Payer: MEDICARE

## 2023-08-07 ENCOUNTER — OFFICE VISIT (OUTPATIENT)
Dept: INTERNAL MEDICINE | Facility: CLINIC | Age: 78
End: 2023-08-07
Payer: MEDICARE

## 2023-08-07 VITALS
HEIGHT: 71 IN | WEIGHT: 221 LBS | HEART RATE: 53 BPM | RESPIRATION RATE: 16 BRPM | TEMPERATURE: 96.9 F | DIASTOLIC BLOOD PRESSURE: 62 MMHG | OXYGEN SATURATION: 98 % | SYSTOLIC BLOOD PRESSURE: 134 MMHG | BODY MASS INDEX: 30.94 KG/M2

## 2023-08-07 DIAGNOSIS — M25.552 PAIN OF LEFT HIP: Primary | ICD-10-CM

## 2023-08-07 DIAGNOSIS — M25.552 PAIN OF LEFT HIP: ICD-10-CM

## 2023-08-07 DIAGNOSIS — R53.1 WEAKNESS: ICD-10-CM

## 2023-08-07 PROCEDURE — 72170 X-RAY EXAM OF PELVIS: CPT

## 2023-08-07 PROCEDURE — 73502 X-RAY EXAM HIP UNI 2-3 VIEWS: CPT

## 2023-08-07 PROCEDURE — 3078F DIAST BP <80 MM HG: CPT | Performed by: INTERNAL MEDICINE

## 2023-08-07 PROCEDURE — 3075F SYST BP GE 130 - 139MM HG: CPT | Performed by: INTERNAL MEDICINE

## 2023-08-07 PROCEDURE — 99214 OFFICE O/P EST MOD 30 MIN: CPT | Performed by: INTERNAL MEDICINE

## 2023-08-07 RX ORDER — ESCITALOPRAM OXALATE 10 MG/1
10 TABLET ORAL DAILY
Qty: 90 TABLET | Refills: 1 | Status: SHIPPED | OUTPATIENT
Start: 2023-08-07

## 2023-08-07 RX ORDER — LISINOPRIL 20 MG/1
20 TABLET ORAL 2 TIMES DAILY
Qty: 180 TABLET | Refills: 1 | Status: SHIPPED | OUTPATIENT
Start: 2023-08-07

## 2023-08-07 RX ORDER — METOPROLOL SUCCINATE 25 MG/1
12.5 TABLET, EXTENDED RELEASE ORAL DAILY
Qty: 45 TABLET | Refills: 1 | Status: SHIPPED | OUTPATIENT
Start: 2023-08-07

## 2023-08-07 RX ORDER — OMEPRAZOLE 20 MG/1
20 CAPSULE, DELAYED RELEASE ORAL DAILY
Qty: 90 CAPSULE | Refills: 1 | Status: SHIPPED | OUTPATIENT
Start: 2023-08-07

## 2023-08-07 RX ORDER — HYDROCHLOROTHIAZIDE 12.5 MG/1
12.5 TABLET ORAL DAILY
Qty: 90 TABLET | Refills: 1 | Status: SHIPPED | OUTPATIENT
Start: 2023-08-07

## 2023-08-07 NOTE — PROGRESS NOTES
Subjective     Patient ID: Tristan Hillman is a 78 y.o. male. Patient is here for management of multiple medical problems.     Chief Complaint   Patient presents with    Sleep Apnea    Hip Pain     Left hip     History of Present Illness   Left hip pain    Dizziness and swimming in head. Sitting no dizziness.    ABX seems to be helping. Improved strength. Able to get out of recliner.  Off abx for a week no worsening symptoms.          The following portions of the patient's history were reviewed and updated as appropriate: allergies, current medications, past family history, past medical history, past social history, past surgical history and problem list.    Review of Systems    Current Outpatient Medications:     apixaban (ELIQUIS) 5 MG tablet tablet, Take 1 tablet by mouth 2 (Two) Times a Day., Disp: 60 tablet, Rfl: 11    aspirin 81 MG tablet, Take 1 tablet by mouth Daily., Disp: 30 tablet, Rfl: 0    escitalopram (LEXAPRO) 10 MG tablet, Take 1 tablet by mouth Daily., Disp: 90 tablet, Rfl: 1    hydroCHLOROthiazide (HYDRODIURIL) 12.5 MG tablet, Take 1 tablet by mouth Daily., Disp: 90 tablet, Rfl: 1    hydrOXYzine (ATARAX) 25 MG tablet, Take 1 tablet by mouth 3 (Three) Times a Day As Needed for Anxiety., Disp: 30 tablet, Rfl: 6    lisinopril (PRINIVIL,ZESTRIL) 20 MG tablet, Take 1 tablet by mouth 2 (Two) Times a Day., Disp: 180 tablet, Rfl: 1    metoprolol succinate XL (TOPROL-XL) 25 MG 24 hr tablet, Take 0.5 tablets by mouth Daily., Disp: 45 tablet, Rfl: 1    nitroglycerin (NITROSTAT) 0.4 MG SL tablet, place 1 tablet under the tongue if needed every 5 minutes fo, Disp: 25 tablet, Rfl: 5    omeprazole (priLOSEC) 20 MG capsule, Take 1 capsule by mouth Daily. MUST HAVE APPOINTMENT FOR FURTHER REFILLS., Disp: 90 capsule, Rfl: 1    rosuvastatin (CRESTOR) 5 MG tablet, Take 1 tablet by mouth Daily., Disp: 90 tablet, Rfl: 3    Objective      Blood pressure 134/62, pulse 53, temperature 96.9 øF (36.1 øC), resp. rate 16, height  "180.3 cm (71\"), weight 100 kg (221 lb), SpO2 98 %.    Physical Exam     General Appearance:    Alert, cooperative, no distress, appears stated age   Head:    Normocephalic, without obvious abnormality, atraumatic   Eyes:    PERRL, conjunctiva/corneas clear, EOM's intact   Ears:    Normal TM's and external ear canals, both ears   Nose:   Nares normal, septum midline, mucosa normal, no drainage   or sinus tenderness   Throat:   Lips, mucosa, and tongue normal; teeth and gums normal   Neck:   Supple, symmetrical, trachea midline, no adenopathy;        thyroid:  No enlargement/tenderness/nodules; no carotid    bruit or JVD   Back:     Symmetric, no curvature, ROM normal, no CVA tenderness   Lungs:     Clear to auscultation bilaterally, respirations unlabored   Chest wall:    No tenderness or deformity   Heart:    Regular rate and rhythm, S1 and S2 normal, no murmur,        rub or gallop   Abdomen:     Soft, non-tender, bowel sounds active all four quadrants,     no masses, no organomegaly   Extremities:   Ttp of left hip and illiac area.  Extremities normal, atraumatic, no cyanosis or edema   Pulses:   2+ and symmetric all extremities   Skin:   Skin color, texture, turgor normal, no rashes or lesions   Lymph nodes:   Cervical, supraclavicular, and axillary nodes normal   Neurologic:   CNII-XII intact. Normal strength, sensation and reflexes       throughout      Results for orders placed or performed in visit on 05/24/23   Lyme Disease, PCR - , Arm, Right    Specimen: Arm, Right   Result Value Ref Range    Lyme Disease(B.burgdorferi)PCR Negative Negative   Lipid Panel    Specimen: Blood   Result Value Ref Range    Total Cholesterol 139 100 - 199 mg/dL    Triglycerides 174 (H) 0 - 149 mg/dL    HDL Cholesterol 37 (L) >39 mg/dL    VLDL Cholesterol Rajesh 30 5 - 40 mg/dL    LDL Chol Calc (NIH) 72 0 - 99 mg/dL   PSA Screen    Specimen: Blood   Result Value Ref Range    PSA 1.1 0.0 - 4.0 ng/mL   Vitamin B12    Specimen: Blood "   Result Value Ref Range    Vitamin B-12 699 232 - 1,245 pg/mL   Comprehensive Metabolic Panel    Specimen: Blood   Result Value Ref Range    Glucose 66 (L) 70 - 99 mg/dL    BUN 17 8 - 27 mg/dL    Creatinine 0.94 0.76 - 1.27 mg/dL    EGFR Result 84 >59 mL/min/1.73    BUN/Creatinine Ratio 18 10 - 24    Sodium 141 134 - 144 mmol/L    Potassium 4.4 3.5 - 5.2 mmol/L    Chloride 104 96 - 106 mmol/L    Total CO2 24 20 - 29 mmol/L    Calcium 9.6 8.6 - 10.2 mg/dL    Total Protein 6.4 6.0 - 8.5 g/dL    Albumin 4.3 3.7 - 4.7 g/dL    Globulin 2.1 1.5 - 4.5 g/dL    A/G Ratio 2.0 1.2 - 2.2    Total Bilirubin 0.3 0.0 - 1.2 mg/dL    Alkaline Phosphatase 61 44 - 121 IU/L    AST (SGOT) 23 0 - 40 IU/L    ALT (SGPT) 20 0 - 44 IU/L   TSH    Specimen: Blood   Result Value Ref Range    TSH 1.270 0.450 - 4.500 uIU/mL   T4, Free    Specimen: Blood   Result Value Ref Range    Free T4 1.07 0.82 - 1.77 ng/dL   [place] Spotted Fever, IgM    Specimen: Blood   Result Value Ref Range    RMSF IgM 0.48 0.00 - 0.89 index   [facility] Spotted Fever, IgG    Specimen: Blood   Result Value Ref Range    RMSF IgG Equivocal Negative   Ehrlichia Antibody Panel    Specimen: Blood   Result Value Ref Range    E. chaffeensis (HME) IgG Titer Negative Neg:<1:64    E. chaffeensis (HME) IgM Titer Negative Neg:<1:20    HGE IgG Titer Negative Neg:<1:64    HGE IgM Titer Negative Neg:<1:20   Cyclic Citrul Peptide Antibody, IgG / IgA    Specimen: Blood   Result Value Ref Range    CCP Antibodies IgG/IgA 3 0 - 19 units   Rheumatoid Factor    Specimen: Blood   Result Value Ref Range    RA Latex Turbid <10.0 <14.0 IU/mL   Sedimentation Rate    Specimen: Blood   Result Value Ref Range    Sed Rate 16 0 - 30 mm/hr   Uric Acid    Specimen: Blood   Result Value Ref Range    Uric Acid 6.2 3.8 - 8.4 mg/dL   C-reactive Protein    Specimen: Blood   Result Value Ref Range    C-Reactive Protein <1 0 - 10 mg/L   Antistreptolysin O Titer    Specimen: Blood   Result Value Ref Range     ASO 21.5 0.0 - 200.0 IU/mL   Sjogren's Antibody, Anti-SS-A / -SS-B    Specimen: Blood   Result Value Ref Range    Sjogren's Anti-SS-A 0.3 0.0 - 0.9 AI    Sjogren's Anti-SS-B <0.2 0.0 - 0.9 AI   CK    Specimen: Blood   Result Value Ref Range    Creatine Kinase 52 41 - 331 U/L   Myoglobin, Serum    Specimen: Blood   Result Value Ref Range    Myoglobin 31 28 - 72 ng/mL   CBC & Differential    Specimen: Blood   Result Value Ref Range    WBC 7.8 3.4 - 10.8 x10E3/uL    RBC 4.70 4.14 - 5.80 x10E6/uL    Hemoglobin 15.0 13.0 - 17.7 g/dL    Hematocrit 43.8 37.5 - 51.0 %    MCV 93 79 - 97 fL    MCH 31.9 26.6 - 33.0 pg    MCHC 34.2 31.5 - 35.7 g/dL    RDW 12.5 11.6 - 15.4 %    Platelets 180 150 - 450 x10E3/uL    Neutrophil Rel % 58 Not Estab. %    Lymphocyte Rel % 24 Not Estab. %    Monocyte Rel % 12 Not Estab. %    Eosinophil Rel % 5 Not Estab. %    Basophil Rel % 1 Not Estab. %    Neutrophils Absolute 4.4 1.4 - 7.0 x10E3/uL    Lymphocytes Absolute 1.8 0.7 - 3.1 x10E3/uL    Monocytes Absolute 1.0 (H) 0.1 - 0.9 x10E3/uL    Eosinophils Absolute 0.4 0.0 - 0.4 x10E3/uL    Basophils Absolute 0.1 0.0 - 0.2 x10E3/uL    Immature Granulocyte Rel % 0 Not Estab. %    Immature Grans Absolute 0.0 0.0 - 0.1 x10E3/uL   Reflexed RMSF, IgG, IFA   Result Value Ref Range    RMSF IgG 1:128 (H) Neg <1:64         Assessment & Plan     Slow hr and dizziness with standing.    Hr  130-87 mostly in the 50's.   Dr Edouard recommended Electrophysiologist. Dr Garcia.   Suspect he will need a pacemaker.    May try massage therapy. Get xr of the area. Ttp of pelvic bone    Still weak get up and go.            Diagnoses and all orders for this visit:    1. Pain of left hip (Primary)  -     XR Pelvis 1 or 2 View; Future  -     XR Hip With or Without Pelvis 2 - 3 View Left; Future  -     Ambulatory Referral to Physical Therapy Evaluate and treat; Stretching, ROM, Strengthening    2. Weakness  -     Ambulatory Referral to Physical Therapy Evaluate and treat;  Stretching, ROM, Strengthening    Other orders  -     escitalopram (LEXAPRO) 10 MG tablet; Take 1 tablet by mouth Daily.  Dispense: 90 tablet; Refill: 1  -     hydroCHLOROthiazide (HYDRODIURIL) 12.5 MG tablet; Take 1 tablet by mouth Daily.  Dispense: 90 tablet; Refill: 1  -     lisinopril (PRINIVIL,ZESTRIL) 20 MG tablet; Take 1 tablet by mouth 2 (Two) Times a Day.  Dispense: 180 tablet; Refill: 1  -     metoprolol succinate XL (TOPROL-XL) 25 MG 24 hr tablet; Take 0.5 tablets by mouth Daily.  Dispense: 45 tablet; Refill: 1  -     omeprazole (priLOSEC) 20 MG capsule; Take 1 capsule by mouth Daily. MUST HAVE APPOINTMENT FOR FURTHER REFILLS.  Dispense: 90 capsule; Refill: 1      Return in about 6 weeks (around 9/18/2023).          There are no Patient Instructions on file for this visit.     Taz Garcia MD    Assessment & Plan

## 2023-08-07 NOTE — PROGRESS NOTES
No bone lesions found. No fracture./    If not better or worsening in thenext few days to weeks return to clinic.

## 2023-09-01 ENCOUNTER — OFFICE VISIT (OUTPATIENT)
Dept: CARDIOLOGY | Facility: CLINIC | Age: 78
End: 2023-09-01
Payer: MEDICARE

## 2023-09-01 VITALS
WEIGHT: 224.4 LBS | HEIGHT: 70 IN | DIASTOLIC BLOOD PRESSURE: 72 MMHG | SYSTOLIC BLOOD PRESSURE: 154 MMHG | HEART RATE: 77 BPM | OXYGEN SATURATION: 97 % | BODY MASS INDEX: 32.13 KG/M2

## 2023-09-01 DIAGNOSIS — I47.1 PAROXYSMAL SVT (SUPRAVENTRICULAR TACHYCARDIA): Primary | ICD-10-CM

## 2023-09-01 DIAGNOSIS — I25.10 CORONARY ARTERY DISEASE INVOLVING NATIVE CORONARY ARTERY OF NATIVE HEART WITHOUT ANGINA PECTORIS: ICD-10-CM

## 2023-09-01 RX ORDER — UBIDECARENONE 100 MG
100 CAPSULE ORAL DAILY
COMMUNITY

## 2023-09-01 NOTE — PROGRESS NOTES
Cardiac Electrophysiology Outpatient Note  [facility]    Office Visit     Tristan Hillman  [id number]  09/01/2023    Primary Care Physician: Taz Garcia MD    Referred By: Kishore Edouard MD    Subjective     Chief Complaint   Patient presents with    Atrial flutter with rapid ventricular response       History of Present Illness:   Tristan Hillman is a 78 y.o. male who presents to my electrophysiology clinic for tachycardia and palpitations.  Patient has a history of significant coronary disease and follows with Dr. Edoaurd.  He has had 11 stents in the past.  He was having significant palpitations back in May and underwent amatory monitor with Dr. Edouard.  This showed very frequent SVT.  There was concern this may represent atrial flutter and he was started on anticoagulation.  Was referred here for further evaluation of this.    He overall is feeling better now.  His episodes have been much less frequent.  He only occasionally feels his heart racing at night and has not been too bothersome to him.  He says oftentimes he can cough and it will go back to normal.  Denies any chest pain.  Denies any dyspnea on exertion.  Denies any dizziness or syncope.  Past Medical History:   Diagnosis Date    Ankle sprain     Anxiety     Arthritis of back     Arthritis of neck     Bursitis of hip     Coronary artery disease     GERD (gastroesophageal reflux disease)     Hip arthrosis     History of insomnia     Hyperlipidemia     Hypertension     Low back pain     Low back strain     Neck strain     Periarthritis of shoulder     Rotator cuff syndrome     Tennis elbow        Past Surgical History:   Procedure Laterality Date    CORONARY STENT PLACEMENT         Family History   Problem Relation Age of Onset    Heart attack Mother     Heart attack Father     Cancer Sister     Diabetes Sister     Hypertension Sister     Heart attack Brother        Social History     Socioeconomic  "History    Marital status:    Tobacco Use    Smoking status: Never    Smokeless tobacco: Current    Tobacco comments:     Chews on cigars   Vaping Use    Vaping Use: Never used   Substance and Sexual Activity    Alcohol use: Not Currently    Drug use: No    Sexual activity: Not Currently     Partners: Female         Current Outpatient Medications:     apixaban (ELIQUIS) 5 MG tablet tablet, Take 1 tablet by mouth 2 (Two) Times a Day., Disp: 60 tablet, Rfl: 11    aspirin 81 MG tablet, Take 1 tablet by mouth Daily., Disp: 30 tablet, Rfl: 0    coenzyme Q10 100 MG capsule, Take 1 capsule by mouth Daily., Disp: , Rfl:     escitalopram (LEXAPRO) 10 MG tablet, Take 1 tablet by mouth Daily., Disp: 90 tablet, Rfl: 1    hydroCHLOROthiazide (HYDRODIURIL) 12.5 MG tablet, Take 1 tablet by mouth Daily., Disp: 90 tablet, Rfl: 1    hydrOXYzine (ATARAX) 25 MG tablet, Take 1 tablet by mouth 3 (Three) Times a Day As Needed for Anxiety., Disp: 30 tablet, Rfl: 6    lisinopril (PRINIVIL,ZESTRIL) 20 MG tablet, Take 1 tablet by mouth 2 (Two) Times a Day., Disp: 180 tablet, Rfl: 1    metoprolol succinate XL (TOPROL-XL) 25 MG 24 hr tablet, Take 0.5 tablets by mouth Daily., Disp: 45 tablet, Rfl: 1    nitroglycerin (NITROSTAT) 0.4 MG SL tablet, place 1 tablet under the tongue if needed every 5 minutes fo, Disp: 25 tablet, Rfl: 5    omeprazole (priLOSEC) 20 MG capsule, Take 1 capsule by mouth Daily. MUST HAVE APPOINTMENT FOR FURTHER REFILLS., Disp: 90 capsule, Rfl: 1    rosuvastatin (CRESTOR) 5 MG tablet, Take 1 tablet by mouth Daily., Disp: 90 tablet, Rfl: 3    Allergies:   No Known Allergies    Objective   Vital Signs: Blood pressure 154/72, pulse 77, height 177.8 cm (70\"), weight 102 kg (224 lb 6.4 oz), SpO2 97 %.    PHYSICAL EXAM  General appearance: Awake, alert, cooperative  Head: Normocephalic, without obvious abnormality, atraumatic  Neck: No JVD  Lungs: Clear to ascultation bilaterally  Heart: Regular rate and rhythm, no murmurs, " 2+ LE pulses, no lower extremity swelling  Skin: Skin color, turgor normal, no rashes or lesions  Neurologic: Grossly normal     Lab Results   Component Value Date    GLUCOSE 66 (L) 05/24/2023    CALCIUM 9.6 05/24/2023     05/24/2023    K 4.4 05/24/2023    CO2 24 05/24/2023     05/24/2023    BUN 17 05/24/2023    CREATININE 0.94 05/24/2023    EGFRIFAFRI 107 12/21/2021    EGFRIFNONA 88 12/21/2021    BCR 18 05/24/2023    ANIONGAP 9 05/25/2016     Lab Results   Component Value Date    WBC 7.8 05/24/2023    HGB 15.0 05/24/2023    HCT 43.8 05/24/2023    MCV 93 05/24/2023     05/24/2023     No results found for: INR, PROTIME  Lab Results   Component Value Date    TSH 1.270 05/24/2023                 I personally viewed and interpreted the patient's EKG/Telemetry/lab data    Procedures    Trisatn Hillman  reports that he has never smoked. He uses smokeless tobacco..         Advance Care Planning   Advance Care Planning: ACP discussion was held with the patient during this visit. Patient does not have an advance directive, declines further assistance.     Assessment & Plan    1. Paroxysmal SVT (supraventricular tachycardia)  Patient is referred for palpitations and an ambulatory monitor finding of SVT.  I personally interpreted the results from his monitor.  This showed multiple episodes of SVT.  A large number of these were short episodes that were clearly atrial tachycardia.  There were some more sustained episodes of a fairly rapid narrow complex tachycardia.  The longest of these was approximately 6 minutes.  Differential for this includes atrial flutter versus AVNRT.  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  On some of these tracings there does seem to be a retrograde P wave just after the end of the QRS complex.  However, due to this is only one lead tracing I cannot completely rule out atrial flutter.    Talked about management going forward.  He is currently anticoagulated.  We discussed the option of  an EP to more definitively determine the etiology of this arrhythmia and offer potential definitive treatment.  This is based mostly on symptoms.  At the current time his palpitations are not too bothersome to him and seem to be improving.  Therefore he would like to hold off on invasive procedure.  I think this is reasonable.  Should his episodes worsen we can reconsider this.  Even though I favor this to be AV node reentry, I would recommend continue anticoagulation for now I cannot completely rule out atrial flutter.  We will continue to monitor and we will rediscuss stopping anticoagulation in the future.    2. Coronary artery disease involving native coronary artery of native heart without angina pectoris  He has history of coronary disease and follows with Dr. Edouard.  No angina currently.       Follow Up:  No follow-ups on file.      Thank you for allowing me to participate in the care of your patient. Please do not hesitate to contact me with additional questions or concerns.      Curtis Garcia M.D.  Cardiac Electrophysiologist  [facility] / [facility]

## 2023-09-08 ENCOUNTER — TELEPHONE (OUTPATIENT)
Dept: INTERNAL MEDICINE | Facility: CLINIC | Age: 78
End: 2023-09-08
Payer: MEDICARE

## 2023-09-08 NOTE — TELEPHONE ENCOUNTER
Caller: Alayna Onofre    Relationship to patient: Emergency Contact    Best call back number: [phone]     Chief complaint: APPOINTMENT IS TOO FAR OUT     Type of visit: SUBSEQUENT MEDICARE WELLNESS VISIT     Requested date: AS SOON AS POSSIBLE      If rescheduling, when is the original appointment: 12/26     Additional notes:PATIENTS DAUGHTER WOULD LIKE A CALL TO DISCUSS THE SLEEP STUDY AND TO DISCUSS OTHER MEDICAL CONCERNS OF PATIENT

## 2023-09-08 NOTE — TELEPHONE ENCOUNTER
Was originally scheduled for late Sept., but had to reschedule as wife has to have eye surgery on that date, 1st available is Dec. D[facility]. Took the AB's. Also, asked for results of sleep study. They are scanned in.

## 2023-10-20 RX ORDER — HYDROXYZINE HYDROCHLORIDE 25 MG/1
25 TABLET, FILM COATED ORAL 3 TIMES DAILY PRN
Qty: 90 TABLET | Refills: 3 | Status: SHIPPED | OUTPATIENT
Start: 2023-10-20

## 2023-11-02 RX ORDER — OMEPRAZOLE 20 MG/1
20 CAPSULE, DELAYED RELEASE ORAL DAILY
Qty: 90 CAPSULE | Refills: 1 | Status: SHIPPED | OUTPATIENT
Start: 2023-11-02

## 2023-11-09 RX ORDER — LISINOPRIL 20 MG/1
20 TABLET ORAL 2 TIMES DAILY
Qty: 180 TABLET | Refills: 0 | Status: SHIPPED | OUTPATIENT
Start: 2023-11-09

## 2023-12-27 ENCOUNTER — OFFICE VISIT (OUTPATIENT)
Dept: INTERNAL MEDICINE | Facility: CLINIC | Age: 78
End: 2023-12-27
Payer: MEDICARE

## 2023-12-27 VITALS
OXYGEN SATURATION: 97 % | WEIGHT: 226.12 LBS | HEIGHT: 70 IN | HEART RATE: 53 BPM | DIASTOLIC BLOOD PRESSURE: 72 MMHG | BODY MASS INDEX: 32.37 KG/M2 | SYSTOLIC BLOOD PRESSURE: 114 MMHG | TEMPERATURE: 97.8 F

## 2023-12-27 DIAGNOSIS — R21 RASH: Primary | ICD-10-CM

## 2023-12-27 DIAGNOSIS — E55.9 VITAMIN D DEFICIENCY, UNSPECIFIED: ICD-10-CM

## 2023-12-27 DIAGNOSIS — Z12.5 ENCOUNTER FOR SCREENING FOR MALIGNANT NEOPLASM OF PROSTATE: ICD-10-CM

## 2023-12-27 DIAGNOSIS — Z00.00 ROUTINE GENERAL MEDICAL EXAMINATION AT A HEALTH CARE FACILITY: ICD-10-CM

## 2023-12-27 DIAGNOSIS — I48.0 PAROXYSMAL ATRIAL FIBRILLATION: ICD-10-CM

## 2023-12-27 DIAGNOSIS — R79.9 ABNORMAL FINDING OF BLOOD CHEMISTRY, UNSPECIFIED: ICD-10-CM

## 2023-12-27 DIAGNOSIS — I49.5 TACHY-BRADY SYNDROME: ICD-10-CM

## 2023-12-27 DIAGNOSIS — R42 DIZZINESS: ICD-10-CM

## 2023-12-27 RX ORDER — LISINOPRIL 20 MG/1
1 TABLET ORAL 2 TIMES DAILY
COMMUNITY

## 2023-12-27 RX ORDER — ROSUVASTATIN CALCIUM 20 MG/1
1 TABLET, COATED ORAL DAILY
COMMUNITY

## 2023-12-27 RX ORDER — CLOTRIMAZOLE AND BETAMETHASONE DIPROPIONATE 10; .64 MG/G; MG/G
1 CREAM TOPICAL 2 TIMES DAILY
Qty: 45 G | Refills: 0 | Status: SHIPPED | OUTPATIENT
Start: 2023-12-27

## 2023-12-27 RX ORDER — CARVEDILOL 6.25 MG/1
1 TABLET ORAL 2 TIMES DAILY
COMMUNITY

## 2023-12-27 RX ORDER — FLUTICASONE PROPIONATE 50 MCG
SPRAY, SUSPENSION (ML) NASAL
COMMUNITY

## 2023-12-27 NOTE — PROGRESS NOTES
The ABCs of the Annual Wellness Visit  Subsequent Medicare Wellness Visit    Subjective      Tristan Hillman is a 78 y.o. male who presents for a Subsequent Medicare Wellness Visit.    The following portions of the patient's history were reviewed and   updated as appropriate: allergies, current medications, past family history, past medical history, past social history, past surgical history, and problem list.    Compared to one year ago, the patient feels his physical   health is worse.    Compared to one year ago, the patient feels his mental   health is the same.    Recent Hospitalizations:  He was not admitted to the hospital during the last year.       Current Medical Providers:  Patient Care Team:  Taz Garcia MD as PCP - General (Internal Medicine)    Outpatient Medications Prior to Visit   Medication Sig Dispense Refill    apixaban (ELIQUIS) 5 MG tablet tablet Take 1 tablet by mouth 2 (Two) Times a Day. 60 tablet 11    aspirin 81 MG tablet Take 1 tablet by mouth Daily. 30 tablet 0    carvedilol (COREG) 6.25 MG tablet Take 1 tablet by mouth 2 (Two) Times a Day.      coenzyme Q10 100 MG capsule Take 1 capsule by mouth Daily.      escitalopram (LEXAPRO) 10 MG tablet Take 1 tablet by mouth Daily. 90 tablet 1    fluticasone (FLONASE) 50 MCG/ACT nasal spray INSTILL 2 SPRAYS IN NOSTRIL QD      hydroCHLOROthiazide (HYDRODIURIL) 12.5 MG tablet Take 1 tablet by mouth Daily. 90 tablet 1    hydrOXYzine (ATARAX) 25 MG tablet Take 1 tablet by mouth 3 (Three) Times a Day As Needed for Anxiety. 90 tablet 3    lisinopril (PRINIVIL,ZESTRIL) 20 MG tablet Take 1 tablet by mouth 2 (Two) Times a Day. 180 tablet 0    lisinopril (PRINIVIL,ZESTRIL) 20 MG tablet Take 1 tablet by mouth 2 (Two) Times a Day.      metoprolol succinate XL (TOPROL-XL) 25 MG 24 hr tablet Take 0.5 tablets by mouth Daily. 45 tablet 1    nitroglycerin (NITROSTAT) 0.4 MG SL tablet place 1 tablet under the tongue if needed every 5 minutes fo 25 tablet 5     omeprazole (priLOSEC) 20 MG capsule Take 1 capsule by mouth Daily. MUST HAVE APPOINTMENT FOR FURTHER REFILLS. 90 capsule 1    rosuvastatin (CRESTOR) 20 MG tablet Take 1 tablet by mouth Daily.      rosuvastatin (CRESTOR) 5 MG tablet Take 1 tablet by mouth Daily. 90 tablet 3     No facility-administered medications prior to visit.       No opioid medication identified on active medication list. I have reviewed chart for other potential  high risk medication/s and harmful drug interactions in the elderly.        Aspirin is on active medication list. Aspirin use is indicated based on review of current medical condition/s. Pros and cons of this therapy have been discussed today. Benefits of this medication outweigh potential harm.  Patient has been encouraged to continue taking this medication.  .      Patient Active Problem List   Diagnosis    Arthralgia of multiple joints    Generalized anxiety disorder    Gastroesophageal reflux disease without esophagitis    Dyslipidemia    Primary hypertension    Insomnia    Screening PSA (prostate specific antigen)    Bradycardia    Chronic left-sided low back pain without sciatica    Idiopathic peripheral neuropathy    Encounter for Medicare annual wellness exam    Benign prostatic hyperplasia with nocturia    Coronary artery disease involving native coronary artery without angina pectoris    Chronic tension-type headache, not intractable    Adhesive capsulitis of right shoulder    Rotator cuff injury, right, initial encounter    Near syncope    Paroxysmal atrial fibrillation    Weakness of both lower extremities    Chronic right hip pain    Atherosclerosis of native arteries of extremities with intermittent claudication, bilateral legs    Atrial flutter with rapid ventricular response     Advance Care Planning   Advance Care Planning     Advance Directive is not on file.  ACP discussion was held with the patient during this visit. Patient does not have an advance directive,  "declines further assistance.     Objective    Vitals:    23 1314   BP: 114/72   BP Location: Left arm   Pulse: 53   Temp: 97.8 °F (36.6 °C)   TempSrc: Tympanic   SpO2: 97%   Weight: 103 kg (226 lb 1.9 oz)   Height: 177.8 cm (70\")   PainSc:   3   PainLoc: Elbow  Comment: left  and neck pain     Estimated body mass index is 32.44 kg/m² as calculated from the following:    Height as of this encounter: 177.8 cm (70\").    Weight as of this encounter: 103 kg (226 lb 1.9 oz).           Does the patient have evidence of cognitive impairment?   No            HEALTH RISK ASSESSMENT    Smoking Status:  Social History     Tobacco Use   Smoking Status Never    Passive exposure: Never   Smokeless Tobacco Current   Tobacco Comments    Chews on cigars     Alcohol Consumption:  Social History     Substance and Sexual Activity   Alcohol Use Not Currently     Fall Risk Screen:    DINO Fall Risk Assessment was completed, and patient is at HIGH risk for falls. Assessment completed on:2023    Depression Screenin/27/2023     1:19 PM   PHQ-2/PHQ-9 Depression Screening   Little Interest or Pleasure in Doing Things 0-->not at all   Feeling Down, Depressed or Hopeless 1-->several days   PHQ-9: Brief Depression Severity Measure Score 1       Health Habits and Functional and Cognitive Screenin/27/2023     1:21 PM   Functional & Cognitive Status   Do you have difficulty preparing food and eating? No   Do you have difficulty bathing yourself, getting dressed or grooming yourself? No   Do you have difficulty using the toilet? No   Do you have difficulty moving around from place to place? No   Do you have trouble with steps or getting out of a bed or a chair? No   Current Diet Well Balanced Diet   Dental Exam Not up to date   Eye Exam Up to date   Exercise (times per week) 0 times per week   Current Exercises Include No Regular Exercise   Do you need help using the phone?  No   Are you deaf or do you have serious " difficulty hearing?  Yes   Do you need help to go to places out of walking distance? No   Do you need help shopping? Yes   Do you need help preparing meals?  No   Do you need help with housework?  No   Do you need help with laundry? No   Do you need help taking your medications? Yes   Do you need help managing money? No   Do you ever drive or ride in a car without wearing a seat belt? No       Age-appropriate Screening Schedule:  Refer to the list below for future screening recommendations based on patient's age, sex and/or medical conditions. Orders for these recommended tests are listed in the plan section. The patient has been provided with a written plan.    Health Maintenance   Topic Date Due    COVID-19 Vaccine (1) Never done    ANNUAL WELLNESS VISIT  03/01/2023    INFLUENZA VACCINE  08/01/2023    ZOSTER VACCINE (2 of 3) 08/07/2024 (Originally 8/20/2017)    LIPID PANEL  05/24/2024    BMI FOLLOWUP  08/07/2024    COLORECTAL CANCER SCREENING  08/12/2029    HEPATITIS C SCREENING  Completed    Pneumococcal Vaccine 65+  Completed    TDAP/TD VACCINES  Discontinued                  CMS Preventative Services Quick Reference  Risk Factors Identified During Encounter:    Fall Risk-High or Moderate: Discussed Fall Prevention in the home, Information on Fall Prevention Shared in After Visit Summary, and Sit to Stand Exercise Information Shared in After Visit Summary    The above risks/problems have been discussed with the patient.  Pertinent information has been shared with the patient in the After Visit Summary.    Diagnoses and all orders for this visit:    1. Rash (Primary)  -     clotrimazole-betamethasone (Lotrisone) 1-0.05 % cream; Apply 1 application  topically to the appropriate area as directed 2 (Two) Times a Day.  Dispense: 45 g; Refill: 0    2. Paroxysmal atrial fibrillation    3. Tachy-gerard syndrome    4. Dizziness  -     US carotid bilateral; Future    5. Routine general medical examination at a health [facility]  facility        Follow Up:   Next Medicare Wellness visit to be scheduled in 1 year.      An After Visit Summary and PPPS were made available to the patient.

## 2023-12-27 NOTE — PROGRESS NOTES
Subjective     Patient ID: Tristan Hillman is a 78 y.o. male. Patient is here for management of multiple medical problems.     Chief Complaint   Patient presents with    Annual Exam     Annual Wellness   Itching lesions.  Dizzy weakness    History of Present Illness   Itching lesions x 2 monhts of longer.     Dizzy and weakness.      The following portions of the patient's history were reviewed and updated as appropriate: allergies, current medications, past family history, past medical history, past social history, past surgical history and problem list.    Review of Systems    Current Outpatient Medications:     apixaban (ELIQUIS) 5 MG tablet tablet, Take 1 tablet by mouth 2 (Two) Times a Day., Disp: 60 tablet, Rfl: 11    aspirin 81 MG tablet, Take 1 tablet by mouth Daily., Disp: 30 tablet, Rfl: 0    carvedilol (COREG) 6.25 MG tablet, Take 1 tablet by mouth 2 (Two) Times a Day., Disp: , Rfl:     coenzyme Q10 100 MG capsule, Take 1 capsule by mouth Daily., Disp: , Rfl:     escitalopram (LEXAPRO) 10 MG tablet, Take 1 tablet by mouth Daily., Disp: 90 tablet, Rfl: 1    fluticasone (FLONASE) 50 MCG/ACT nasal spray, INSTILL 2 SPRAYS IN NOSTRIL QD, Disp: , Rfl:     hydroCHLOROthiazide (HYDRODIURIL) 12.5 MG tablet, Take 1 tablet by mouth Daily., Disp: 90 tablet, Rfl: 1    hydrOXYzine (ATARAX) 25 MG tablet, Take 1 tablet by mouth 3 (Three) Times a Day As Needed for Anxiety., Disp: 90 tablet, Rfl: 3    lisinopril (PRINIVIL,ZESTRIL) 20 MG tablet, Take 1 tablet by mouth 2 (Two) Times a Day., Disp: 180 tablet, Rfl: 0    lisinopril (PRINIVIL,ZESTRIL) 20 MG tablet, Take 1 tablet by mouth 2 (Two) Times a Day., Disp: , Rfl:     metoprolol succinate XL (TOPROL-XL) 25 MG 24 hr tablet, Take 0.5 tablets by mouth Daily., Disp: 45 tablet, Rfl: 1    nitroglycerin (NITROSTAT) 0.4 MG SL tablet, place 1 tablet under the tongue if needed every 5 minutes fo, Disp: 25 tablet, Rfl: 5    omeprazole (priLOSEC) 20 MG capsule, Take 1 capsule by mouth  "Daily. MUST HAVE APPOINTMENT FOR FURTHER REFILLS., Disp: 90 capsule, Rfl: 1    rosuvastatin (CRESTOR) 20 MG tablet, Take 1 tablet by mouth Daily., Disp: , Rfl:     rosuvastatin (CRESTOR) 5 MG tablet, Take 1 tablet by mouth Daily., Disp: 90 tablet, Rfl: 3    clotrimazole-betamethasone (Lotrisone) 1-0.05 % cream, Apply 1 application  topically to the appropriate area as directed 2 (Two) Times a Day., Disp: 45 g, Rfl: 0    Objective      Blood pressure 114/72, pulse 53, temperature 97.8 °F (36.6 °C), temperature source Tympanic, height 177.8 cm (70\"), weight 103 kg (226 lb 1.9 oz), SpO2 97%.            Physical Exam     General Appearance:    Alert, cooperative, no distress, appears stated age   Head:    Normocephalic, without obvious abnormality, atraumatic   Eyes:    PERRL, conjunctiva/corneas clear, EOM's intact   Ears:    Normal TM's and external ear canals, both ears   Nose:   Nares normal, septum midline, mucosa normal, no drainage   or sinus tenderness   Throat:   Lips, mucosa, and tongue normal; teeth and gums normal   Neck:   Supple, symmetrical, trachea midline, no adenopathy;        thyroid:  No enlargement/tenderness/nodules; no carotid    bruit or JVD   Back:     Symmetric, no curvature, ROM normal, no CVA tenderness   Lungs:     Clear to auscultation bilaterally, respirations unlabored   Chest wall:    No tenderness or deformity   Heart:    Regular rate and rhythm, S1 and S2 normal, no murmur,        rub or gallop   Abdomen:     Soft, non-tender, bowel sounds active all four quadrants,     no masses, no organomegaly   Extremities:   Extremities normal, atraumatic, no cyanosis or edema   Pulses:   2+ and symmetric all extremities   Skin:   Dry scaling itching plaques non raised x 3. Each leg and right side of waist.       Lymph nodes:   Cervical, supraclavicular, and axillary nodes normal   Neurologic:   CNII-XII intact. Normal strength, sensation and reflexes       throughout      Results for orders " placed or performed in visit on 05/24/23   Lyme Disease, PCR - , Arm, Right    Specimen: Arm, Right   Result Value Ref Range    Lyme Disease(B.burgdorferi)PCR Negative Negative   Lipid Panel    Specimen: Blood   Result Value Ref Range    Total Cholesterol 139 100 - 199 mg/dL    Triglycerides 174 (H) 0 - 149 mg/dL    HDL Cholesterol 37 (L) >39 mg/dL    VLDL Cholesterol Rajesh 30 5 - 40 mg/dL    LDL Chol Calc (NIH) 72 0 - 99 mg/dL   PSA Screen    Specimen: Blood   Result Value Ref Range    PSA 1.1 0.0 - 4.0 ng/mL   Vitamin B12    Specimen: Blood   Result Value Ref Range    Vitamin B-12 699 232 - 1,245 pg/mL   Comprehensive Metabolic Panel    Specimen: Blood   Result Value Ref Range    Glucose 66 (L) 70 - 99 mg/dL    BUN 17 8 - 27 mg/dL    Creatinine 0.94 0.76 - 1.27 mg/dL    EGFR Result 84 >59 mL/min/1.73    BUN/Creatinine Ratio 18 10 - 24    Sodium 141 134 - 144 mmol/L    Potassium 4.4 3.5 - 5.2 mmol/L    Chloride 104 96 - 106 mmol/L    Total CO2 24 20 - 29 mmol/L    Calcium 9.6 8.6 - 10.2 mg/dL    Total Protein 6.4 6.0 - 8.5 g/dL    Albumin 4.3 3.7 - 4.7 g/dL    Globulin 2.1 1.5 - 4.5 g/dL    A/G Ratio 2.0 1.2 - 2.2    Total Bilirubin 0.3 0.0 - 1.2 mg/dL    Alkaline Phosphatase 61 44 - 121 IU/L    AST (SGOT) 23 0 - 40 IU/L    ALT (SGPT) 20 0 - 44 IU/L   TSH    Specimen: Blood   Result Value Ref Range    TSH 1.270 0.450 - 4.500 uIU/mL   T4, Free    Specimen: Blood   Result Value Ref Range    Free T4 1.07 0.82 - 1.77 ng/dL   [place] Spotted Fever, IgM    Specimen: Blood   Result Value Ref Range    RMSF IgM 0.48 0.00 - 0.89 index   [facility] Spotted Fever, IgG    Specimen: Blood   Result Value Ref Range    RMSF IgG Equivocal Negative   Ehrlichia Antibody Panel    Specimen: Blood   Result Value Ref Range    E. chaffeensis (HME) IgG Titer Negative Neg:<1:64    E. chaffeensis (HME) IgM Titer Negative Neg:<1:20    HGE IgG Titer Negative Neg:<1:64    HGE IgM Titer Negative Neg:<1:20   Cyclic Citrul Peptide Antibody, IgG  / IgA    Specimen: Blood   Result Value Ref Range    CCP Antibodies IgG/IgA 3 0 - 19 units   Rheumatoid Factor    Specimen: Blood   Result Value Ref Range    RA Latex Turbid <10.0 <14.0 IU/mL   Sedimentation Rate    Specimen: Blood   Result Value Ref Range    Sed Rate 16 0 - 30 mm/hr   Uric Acid    Specimen: Blood   Result Value Ref Range    Uric Acid 6.2 3.8 - 8.4 mg/dL   C-reactive Protein    Specimen: Blood   Result Value Ref Range    C-Reactive Protein <1 0 - 10 mg/L   Antistreptolysin O Titer    Specimen: Blood   Result Value Ref Range    ASO 21.5 0.0 - 200.0 IU/mL   Sjogren's Antibody, Anti-SS-A / -SS-B    Specimen: Blood   Result Value Ref Range    Sjogren's Anti-SS-A 0.3 0.0 - 0.9 AI    Sjogren's Anti-SS-B <0.2 0.0 - 0.9 AI   CK    Specimen: Blood   Result Value Ref Range    Creatine Kinase 52 41 - 331 U/L   Myoglobin, Serum    Specimen: Blood   Result Value Ref Range    Myoglobin 31 28 - 72 ng/mL   CBC & Differential    Specimen: Blood   Result Value Ref Range    WBC 7.8 3.4 - 10.8 x10E3/uL    RBC 4.70 4.14 - 5.80 x10E6/uL    Hemoglobin 15.0 13.0 - 17.7 g/dL    Hematocrit 43.8 37.5 - 51.0 %    MCV 93 79 - 97 fL    MCH 31.9 26.6 - 33.0 pg    MCHC 34.2 31.5 - 35.7 g/dL    RDW 12.5 11.6 - 15.4 %    Platelets 180 150 - 450 x10E3/uL    Neutrophil Rel % 58 Not Estab. %    Lymphocyte Rel % 24 Not Estab. %    Monocyte Rel % 12 Not Estab. %    Eosinophil Rel % 5 Not Estab. %    Basophil Rel % 1 Not Estab. %    Neutrophils Absolute 4.4 1.4 - 7.0 x10E3/uL    Lymphocytes Absolute 1.8 0.7 - 3.1 x10E3/uL    Monocytes Absolute 1.0 (H) 0.1 - 0.9 x10E3/uL    Eosinophils Absolute 0.4 0.0 - 0.4 x10E3/uL    Basophils Absolute 0.1 0.0 - 0.2 x10E3/uL    Immature Granulocyte Rel % 0 Not Estab. %    Immature Grans Absolute 0.0 0.0 - 0.1 x10E3/uL   Reflexed RMSF, IgG, IFA   Result Value Ref Range    RMSF IgG 1:128 (H) Neg <1:64         Assessment & Plan     Pt with proximal atrial fib. Currently in A fib. Likely contributing to  dizziness.  Hr slow on coreg 6.25.  may need reduced unless been done, an hr got to high. May just need to keep coreg at 6.25 and get Pacemaker.  Pt has f/u with EP this Friday.  Will get carotid study.     Needs Echo. Will get one if not done soon.  Had Stent x 11.              Diagnoses and all orders for this visit:    1. Rash (Primary)  -     clotrimazole-betamethasone (Lotrisone) 1-0.05 % cream; Apply 1 application  topically to the appropriate area as directed 2 (Two) Times a Day.  Dispense: 45 g; Refill: 0    2. Paroxysmal atrial fibrillation    3. Tachy-gerard syndrome    4. Dizziness  -     US carotid bilateral; Future      Return in about 6 weeks (around 2/7/2024).          There are no Patient Instructions on file for this visit.     Taz Garcia MD    Assessment & Plan

## 2023-12-28 LAB
25(OH)D3+25(OH)D2 SERPL-MCNC: 37.4 NG/ML (ref 30–100)
ALBUMIN SERPL-MCNC: 4.5 G/DL (ref 3.8–4.8)
ALBUMIN/GLOB SERPL: 1.8 {RATIO} (ref 1.2–2.2)
ALP SERPL-CCNC: 66 IU/L (ref 44–121)
ALT SERPL-CCNC: 18 IU/L (ref 0–44)
AST SERPL-CCNC: 22 IU/L (ref 0–40)
BASOPHILS # BLD AUTO: 0.1 X10E3/UL (ref 0–0.2)
BASOPHILS NFR BLD AUTO: 1 %
BILIRUB SERPL-MCNC: 0.4 MG/DL (ref 0–1.2)
BUN SERPL-MCNC: 21 MG/DL (ref 8–27)
BUN/CREAT SERPL: 21 (ref 10–24)
CALCIUM SERPL-MCNC: 9.6 MG/DL (ref 8.6–10.2)
CHLORIDE SERPL-SCNC: 103 MMOL/L (ref 96–106)
CHOLEST SERPL-MCNC: 177 MG/DL (ref 100–199)
CO2 SERPL-SCNC: 22 MMOL/L (ref 20–29)
CREAT SERPL-MCNC: 1.02 MG/DL (ref 0.76–1.27)
EGFRCR SERPLBLD CKD-EPI 2021: 76 ML/MIN/1.73
EOSINOPHIL # BLD AUTO: 0.2 X10E3/UL (ref 0–0.4)
EOSINOPHIL NFR BLD AUTO: 2 %
ERYTHROCYTE [DISTWIDTH] IN BLOOD BY AUTOMATED COUNT: 11.8 % (ref 11.6–15.4)
GLOBULIN SER CALC-MCNC: 2.5 G/DL (ref 1.5–4.5)
GLUCOSE SERPL-MCNC: 98 MG/DL (ref 70–99)
HBA1C MFR BLD: 5.9 % (ref 4.8–5.6)
HCT VFR BLD AUTO: 44 % (ref 37.5–51)
HDLC SERPL-MCNC: 39 MG/DL
HGB BLD-MCNC: 14.5 G/DL (ref 13–17.7)
IMM GRANULOCYTES # BLD AUTO: 0 X10E3/UL (ref 0–0.1)
IMM GRANULOCYTES NFR BLD AUTO: 0 %
LDLC SERPL CALC-MCNC: 105 MG/DL (ref 0–99)
LYMPHOCYTES # BLD AUTO: 1.9 X10E3/UL (ref 0.7–3.1)
LYMPHOCYTES NFR BLD AUTO: 20 %
MCH RBC QN AUTO: 30.5 PG (ref 26.6–33)
MCHC RBC AUTO-ENTMCNC: 33 G/DL (ref 31.5–35.7)
MCV RBC AUTO: 92 FL (ref 79–97)
MONOCYTES # BLD AUTO: 0.9 X10E3/UL (ref 0.1–0.9)
MONOCYTES NFR BLD AUTO: 9 %
NEUTROPHILS # BLD AUTO: 6.7 X10E3/UL (ref 1.4–7)
NEUTROPHILS NFR BLD AUTO: 68 %
PLATELET # BLD AUTO: 176 X10E3/UL (ref 150–450)
POTASSIUM SERPL-SCNC: 4.2 MMOL/L (ref 3.5–5.2)
PROT SERPL-MCNC: 7 G/DL (ref 6–8.5)
PSA SERPL-MCNC: 1.5 NG/ML (ref 0–4)
RBC # BLD AUTO: 4.76 X10E6/UL (ref 4.14–5.8)
SODIUM SERPL-SCNC: 141 MMOL/L (ref 134–144)
T4 FREE SERPL-MCNC: 1.22 NG/DL (ref 0.82–1.77)
TRIGL SERPL-MCNC: 189 MG/DL (ref 0–149)
TSH SERPL DL<=0.005 MIU/L-ACNC: 1.09 UIU/ML (ref 0.45–4.5)
UNABLE TO VOID: NORMAL
VIT B12 SERPL-MCNC: 845 PG/ML (ref 232–1245)
VLDLC SERPL CALC-MCNC: 33 MG/DL (ref 5–40)
WBC # BLD AUTO: 9.8 X10E3/UL (ref 3.4–10.8)

## 2023-12-29 ENCOUNTER — OFFICE VISIT (OUTPATIENT)
Dept: CARDIOLOGY | Facility: CLINIC | Age: 78
End: 2023-12-29
Payer: MEDICARE

## 2023-12-29 VITALS
OXYGEN SATURATION: 97 % | HEIGHT: 71 IN | WEIGHT: 226 LBS | BODY MASS INDEX: 31.64 KG/M2 | DIASTOLIC BLOOD PRESSURE: 82 MMHG | HEART RATE: 69 BPM | SYSTOLIC BLOOD PRESSURE: 126 MMHG

## 2023-12-29 DIAGNOSIS — I48.0 PAROXYSMAL ATRIAL FIBRILLATION: Primary | ICD-10-CM

## 2023-12-29 PROCEDURE — 93000 ELECTROCARDIOGRAM COMPLETE: CPT | Performed by: STUDENT IN AN ORGANIZED HEALTH CARE EDUCATION/TRAINING PROGRAM

## 2023-12-29 PROCEDURE — 3079F DIAST BP 80-89 MM HG: CPT | Performed by: STUDENT IN AN ORGANIZED HEALTH CARE EDUCATION/TRAINING PROGRAM

## 2023-12-29 PROCEDURE — 99214 OFFICE O/P EST MOD 30 MIN: CPT | Performed by: STUDENT IN AN ORGANIZED HEALTH CARE EDUCATION/TRAINING PROGRAM

## 2023-12-29 PROCEDURE — 3074F SYST BP LT 130 MM HG: CPT | Performed by: STUDENT IN AN ORGANIZED HEALTH CARE EDUCATION/TRAINING PROGRAM

## 2023-12-29 NOTE — PROGRESS NOTES
Cardiac Electrophysiology Outpatient Note  [facility]    Office Visit     Tristan Hillman  [id number]  09/01/2023    Primary Care Physician: Taz Garcia MD    Referred By: No ref. provider found    Subjective     Chief Complaint   Patient presents with    Paroxysmal SVT (supraventricular tachycardia)       History of Present Illness:   Tristan Hillman is a 78 y.o. male who presents to my electrophysiology clinic for tachycardia and palpitations.  Patient has a history of significant coronary disease and follows with Dr. Edouard.  He has had 11 stents in the past.  He was having significant palpitations back in May and underwent ambulatory monitor with Dr. Edouard.  This showed very frequent SVT.  There was concern this may represent atrial flutter and he was started on anticoagulation.  Was referred here for further evaluation of this.  At her initial visit, I discussed with him that I was uncertain whether this represented AV node reentry versus atrial flutter.  We discussed the option of an EP study to clarify this further, but as he is feeling well we decided to defer this.    He continues to feel well since his last visit.  He has not had any major palpitations.  He does have some shortness of breath on exertion.  Denies any presyncope or syncope.    Past Medical History:   Diagnosis Date    Ankle sprain     Anxiety     Arthritis of back     Arthritis of neck     Bursitis of hip     Coronary artery disease     GERD (gastroesophageal reflux disease)     Hip arthrosis     History of insomnia     Hyperlipidemia     Hypertension     Low back pain     Low back strain     Neck strain     Periarthritis of shoulder     Rotator cuff syndrome     Tennis elbow        Past Surgical History:   Procedure Laterality Date    CORONARY STENT PLACEMENT         Family History   Problem Relation Age of Onset    Heart attack Mother     Heart attack Father     Cancer Sister     Diabetes  Sister     Hypertension Sister     Heart attack Brother        Social History     Socioeconomic History    Marital status:    Tobacco Use    Smoking status: Never     Passive exposure: Never    Smokeless tobacco: Current    Tobacco comments:     Chews on cigars   Vaping Use    Vaping Use: Never used   Substance and Sexual Activity    Alcohol use: Not Currently    Drug use: No    Sexual activity: Not Currently     Partners: Female         Current Outpatient Medications:     apixaban (ELIQUIS) 5 MG tablet tablet, Take 1 tablet by mouth 2 (Two) Times a Day., Disp: 60 tablet, Rfl: 11    aspirin 81 MG tablet, Take 1 tablet by mouth Daily., Disp: 30 tablet, Rfl: 0    carvedilol (COREG) 6.25 MG tablet, Take 1 tablet by mouth 2 (Two) Times a Day., Disp: , Rfl:     clotrimazole-betamethasone (Lotrisone) 1-0.05 % cream, Apply 1 application  topically to the appropriate area as directed 2 (Two) Times a Day., Disp: 45 g, Rfl: 0    coenzyme Q10 100 MG capsule, Take 1 capsule by mouth Daily., Disp: , Rfl:     escitalopram (LEXAPRO) 10 MG tablet, Take 1 tablet by mouth Daily., Disp: 90 tablet, Rfl: 1    fluticasone (FLONASE) 50 MCG/ACT nasal spray, INSTILL 2 SPRAYS IN NOSTRIL QD, Disp: , Rfl:     hydroCHLOROthiazide (HYDRODIURIL) 12.5 MG tablet, Take 1 tablet by mouth Daily., Disp: 90 tablet, Rfl: 1    hydrOXYzine (ATARAX) 25 MG tablet, Take 1 tablet by mouth 3 (Three) Times a Day As Needed for Anxiety., Disp: 90 tablet, Rfl: 3    lisinopril (PRINIVIL,ZESTRIL) 20 MG tablet, Take 1 tablet by mouth 2 (Two) Times a Day., Disp: 180 tablet, Rfl: 0    metoprolol succinate XL (TOPROL-XL) 25 MG 24 hr tablet, Take 0.5 tablets by mouth Daily., Disp: 45 tablet, Rfl: 1    nitroglycerin (NITROSTAT) 0.4 MG SL tablet, place 1 tablet under the tongue if needed every 5 minutes fo, Disp: 25 tablet, Rfl: 5    omeprazole (priLOSEC) 20 MG capsule, Take 1 capsule by mouth Daily. MUST HAVE APPOINTMENT FOR FURTHER REFILLS., Disp: 90 capsule, Rfl:  "1    rosuvastatin (CRESTOR) 20 MG tablet, Take 1 tablet by mouth Daily., Disp: , Rfl:     rosuvastatin (CRESTOR) 5 MG tablet, Take 1 tablet by mouth Daily., Disp: 90 tablet, Rfl: 3    lisinopril (PRINIVIL,ZESTRIL) 20 MG tablet, Take 1 tablet by mouth 2 (Two) Times a Day., Disp: , Rfl:     Allergies:   No Known Allergies    Objective   Vital Signs: Blood pressure 126/82, pulse 69, height 180.3 cm (71\"), weight 103 kg (226 lb), SpO2 97%.    PHYSICAL EXAM  General appearance: Awake, alert, cooperative  Head: Normocephalic, without obvious abnormality, atraumatic  Neck: No JVD  Lungs: Clear to ascultation bilaterally  Heart: Regular rate and rhythm, no murmurs, 2+ LE pulses, no lower extremity swelling  Skin: Skin color, turgor normal, no rashes or lesions  Neurologic: Grossly normal     Lab Results   Component Value Date    GLUCOSE 98 12/27/2023    CALCIUM 9.6 12/27/2023     12/27/2023    K 4.2 12/27/2023    CO2 22 12/27/2023     12/27/2023    BUN 21 12/27/2023    CREATININE 1.02 12/27/2023    EGFRIFAFRI 107 12/21/2021    EGFRIFNONA 88 12/21/2021    BCR 21 12/27/2023    ANIONGAP 9 05/25/2016     Lab Results   Component Value Date    WBC 9.8 12/27/2023    HGB 14.5 12/27/2023    HCT 44.0 12/27/2023    MCV 92 12/27/2023     12/27/2023     No results found for: \"INR\", \"PROTIME\"  Lab Results   Component Value Date    TSH 1.090 12/27/2023                 I personally viewed and interpreted the patient's EKG/Telemetry/lab data      ECG 12 Lead    Date/Time: 12/29/2023 4:11 PM  Performed by: Curtis Garcia MD    Authorized by: Curtis Garcia MD  Comparison: compared with previous ECG from 12/12/2022  Similar to previous ECG  Comments: Sinus rhythm with PACs          Tristan Hillman  reports that he has never smoked. He has never been exposed to tobacco smoke. He uses smokeless tobacco..         Advance Care Planning   Advance Care Planning: ACP discussion was held with the patient during this visit. Patient does " not have an advance directive, declines further assistance.     Assessment & Plan    1. Paroxysmal SVT (supraventricular tachycardia)  Patient was initially referred for episodes of SVT unable event monitor.  I stated that this represented AV node reentry, but also cannot rule out atrial flutter.  He also has a possible prior history of atrial fibrillation.  Therefore we elected to keep him on anticoagulation.  He is not having any further symptoms related to his tachyarrhythmias, so we will continue to monitor for now.  We did discuss that should he have recurrent symptoms we could consider EP study with possible ablation.      2. Coronary artery disease involving native coronary artery of native heart without angina pectoris  He has history of coronary disease and follows with Dr. Edouard.  No angina currently.     3.  Dyspnea on exertion  Patient does have significant dyspnea on exertion.  Will check an echocardiogram to look for any underlying structural heart disease.    Follow Up:  No follow-ups on file.      Thank you for allowing me to participate in the care of your patient. Please do not hesitate to contact me with additional questions or concerns.      Curtis Garcia M.D.  Cardiac Electrophysiologist  [facility] / [facility]

## 2024-01-02 ENCOUNTER — TELEPHONE (OUTPATIENT)
Dept: CARDIOLOGY | Facility: CLINIC | Age: 79
End: 2024-01-02
Payer: MEDICARE

## 2024-01-02 DIAGNOSIS — I48.92 ATRIAL FLUTTER WITH RAPID VENTRICULAR RESPONSE: Primary | ICD-10-CM

## 2024-01-02 DIAGNOSIS — I48.0 PAROXYSMAL ATRIAL FIBRILLATION: ICD-10-CM

## 2024-01-02 RX ORDER — HYDROXYZINE HYDROCHLORIDE 25 MG/1
25 TABLET, FILM COATED ORAL 3 TIMES DAILY PRN
Qty: 90 TABLET | Refills: 3 | OUTPATIENT
Start: 2024-01-02

## 2024-01-02 RX ORDER — HYDROCHLOROTHIAZIDE 12.5 MG/1
12.5 TABLET ORAL DAILY
Qty: 90 TABLET | Refills: 1 | Status: SHIPPED | OUTPATIENT
Start: 2024-01-02

## 2024-01-02 NOTE — TELEPHONE ENCOUNTER
----- Message from Curtis Garcia MD sent at 12/29/2023  4:10 PM EST -----  Can we get an echo for him in [place]

## 2024-01-12 RX ORDER — METOPROLOL SUCCINATE 25 MG/1
12.5 TABLET, EXTENDED RELEASE ORAL DAILY
Qty: 45 TABLET | Refills: 1 | Status: SHIPPED | OUTPATIENT
Start: 2024-01-12

## 2024-01-31 ENCOUNTER — HOSPITAL ENCOUNTER (OUTPATIENT)
Dept: CARDIOLOGY | Facility: HOSPITAL | Age: 79
Discharge: HOME | End: 2024-01-31
Admitting: STUDENT IN AN ORGANIZED HEALTH CARE EDUCATION/TRAINING PROGRAM
Payer: MEDICARE

## 2024-01-31 ENCOUNTER — HOSPITAL ENCOUNTER (OUTPATIENT)
Dept: ULTRASOUND IMAGING | Facility: HOSPITAL | Age: 79
Discharge: HOME | End: 2024-01-31
Admitting: INTERNAL MEDICINE
Payer: MEDICARE

## 2024-01-31 VITALS — HEIGHT: 71 IN | BODY MASS INDEX: 31.64 KG/M2 | WEIGHT: 226 LBS

## 2024-01-31 DIAGNOSIS — R42 DIZZINESS: ICD-10-CM

## 2024-01-31 DIAGNOSIS — I48.92 ATRIAL FLUTTER WITH RAPID VENTRICULAR RESPONSE: ICD-10-CM

## 2024-01-31 DIAGNOSIS — I48.0 PAROXYSMAL ATRIAL FIBRILLATION: ICD-10-CM

## 2024-01-31 LAB
BH CV ECHO MEAS - AO MAX PG: 6.5 MMHG
BH CV ECHO MEAS - AO MEAN PG: 4 MMHG
BH CV ECHO MEAS - AO ROOT DIAM: 3.5 CM
BH CV ECHO MEAS - AO V2 MAX: 127 CM/SEC
BH CV ECHO MEAS - AO V2 VTI: 29.3 CM
BH CV ECHO MEAS - AVA(I,D): 2.01 CM2
BH CV ECHO MEAS - EDV(CUBED): 155.7 ML
BH CV ECHO MEAS - EDV(MOD-SP2): 101 ML
BH CV ECHO MEAS - EDV(MOD-SP4): 178 ML
BH CV ECHO MEAS - EF(MOD-BP): 62 %
BH CV ECHO MEAS - EF(MOD-SP2): 62.4 %
BH CV ECHO MEAS - EF(MOD-SP4): 64.6 %
BH CV ECHO MEAS - ESV(CUBED): 40 ML
BH CV ECHO MEAS - ESV(MOD-SP2): 38 ML
BH CV ECHO MEAS - ESV(MOD-SP4): 63 ML
BH CV ECHO MEAS - FS: 36.4 %
BH CV ECHO MEAS - IVS/LVPW: 0.93 CM
BH CV ECHO MEAS - IVSD: 0.96 CM
BH CV ECHO MEAS - LA DIMENSION: 4.9 CM
BH CV ECHO MEAS - LAT PEAK E' VEL: 9.2 CM/SEC
BH CV ECHO MEAS - LV DIASTOLIC VOL/BSA (35-75): 80.1 CM2
BH CV ECHO MEAS - LV MASS(C)D: 204.7 GRAMS
BH CV ECHO MEAS - LV MAX PG: 2.45 MMHG
BH CV ECHO MEAS - LV MEAN PG: 1 MMHG
BH CV ECHO MEAS - LV SYSTOLIC VOL/BSA (12-30): 28.4 CM2
BH CV ECHO MEAS - LV V1 MAX: 78.2 CM/SEC
BH CV ECHO MEAS - LV V1 VTI: 18.7 CM
BH CV ECHO MEAS - LVIDD: 5.4 CM
BH CV ECHO MEAS - LVIDS: 3.4 CM
BH CV ECHO MEAS - LVOT AREA: 3.1 CM2
BH CV ECHO MEAS - LVOT DIAM: 2 CM
BH CV ECHO MEAS - LVPWD: 1.04 CM
BH CV ECHO MEAS - MED PEAK E' VEL: 7.82 CM/SEC
BH CV ECHO MEAS - MV A MAX VEL: 42.9 CM/SEC
BH CV ECHO MEAS - MV DEC SLOPE: 327 CM/SEC2
BH CV ECHO MEAS - MV DEC TIME: 0.25 SEC
BH CV ECHO MEAS - MV E MAX VEL: 74.5 CM/SEC
BH CV ECHO MEAS - MV E/A: 1.74
BH CV ECHO MEAS - MV MAX PG: 3.6 MMHG
BH CV ECHO MEAS - MV MEAN PG: 1 MMHG
BH CV ECHO MEAS - MV P1/2T: 84 MSEC
BH CV ECHO MEAS - MV V2 VTI: 37.3 CM
BH CV ECHO MEAS - MVA(P1/2T): 2.6 CM2
BH CV ECHO MEAS - MVA(VTI): 1.58 CM2
BH CV ECHO MEAS - PA ACC SLOPE: 714 CM/SEC2
BH CV ECHO MEAS - PA ACC TIME: 0.13 SEC
BH CV ECHO MEAS - PA V2 MAX: 121 CM/SEC
BH CV ECHO MEAS - RAP SYSTOLE: 3 MMHG
BH CV ECHO MEAS - RVSP: 31 MMHG
BH CV ECHO MEAS - SI(MOD-SP2): 28.4 ML/M2
BH CV ECHO MEAS - SI(MOD-SP4): 51.8 ML/M2
BH CV ECHO MEAS - SV(LVOT): 58.7 ML
BH CV ECHO MEAS - SV(MOD-SP2): 63 ML
BH CV ECHO MEAS - SV(MOD-SP4): 115 ML
BH CV ECHO MEAS - TAPSE (>1.6): 2.8 CM
BH CV ECHO MEAS - TR MAX PG: 27.9 MMHG
BH CV ECHO MEAS - TR MAX VEL: 264 CM/SEC
BH CV ECHO MEASUREMENTS AVERAGE E/E' RATIO: 8.75
BH CV XLRA - RV BASE: 4.3 CM
BH CV XLRA - RV LENGTH: 6.5 CM
BH CV XLRA - RV MID: 3.4 CM
BH CV XLRA - TDI S': 12.8 CM/SEC
LEFT ATRIUM VOLUME INDEX: 36.9 ML/M2

## 2024-01-31 PROCEDURE — 93306 TTE W/DOPPLER COMPLETE: CPT

## 2024-01-31 PROCEDURE — 93880 EXTRACRANIAL BILAT STUDY: CPT

## 2024-02-13 RX ORDER — LISINOPRIL 20 MG/1
20 TABLET ORAL 2 TIMES DAILY
Qty: 90 TABLET | Refills: 1 | Status: SHIPPED | OUTPATIENT
Start: 2024-02-13

## 2024-04-10 ENCOUNTER — OFFICE VISIT (OUTPATIENT)
Dept: INTERNAL MEDICINE | Facility: CLINIC | Age: 79
End: 2024-04-10
Payer: MEDICARE

## 2024-04-10 VITALS
HEIGHT: 71 IN | BODY MASS INDEX: 32.77 KG/M2 | DIASTOLIC BLOOD PRESSURE: 86 MMHG | SYSTOLIC BLOOD PRESSURE: 134 MMHG | TEMPERATURE: 97.8 F | OXYGEN SATURATION: 97 % | WEIGHT: 234.12 LBS | HEART RATE: 62 BPM

## 2024-04-10 DIAGNOSIS — H60.313 ACUTE DIFFUSE OTITIS EXTERNA OF BOTH EARS: Primary | ICD-10-CM

## 2024-04-10 PROCEDURE — 1159F MED LIST DOCD IN RCRD: CPT | Performed by: NURSE PRACTITIONER

## 2024-04-10 PROCEDURE — 3079F DIAST BP 80-89 MM HG: CPT | Performed by: NURSE PRACTITIONER

## 2024-04-10 PROCEDURE — 1160F RVW MEDS BY RX/DR IN RCRD: CPT | Performed by: NURSE PRACTITIONER

## 2024-04-10 PROCEDURE — 99213 OFFICE O/P EST LOW 20 MIN: CPT | Performed by: NURSE PRACTITIONER

## 2024-04-10 PROCEDURE — 3075F SYST BP GE 130 - 139MM HG: CPT | Performed by: NURSE PRACTITIONER

## 2024-04-10 RX ORDER — CIPROFLOXACIN AND DEXAMETHASONE 3; 1 MG/ML; MG/ML
4 SUSPENSION/ DROPS AURICULAR (OTIC) 2 TIMES DAILY
Qty: 7.5 ML | Refills: 0 | Status: SHIPPED | OUTPATIENT
Start: 2024-04-10 | End: 2024-04-17

## 2024-04-10 NOTE — PROGRESS NOTES
"  Office Visit      Patient Name: Tristan Hillman  : 1945   MRN: [id number]   Care Team: Patient Care Team:  Taz Garcia MD as PCP - General (Internal Medicine)    Chief Complaint  Otitis Media (Patient states that he went to ENT and they told him that his right ear looked infected.  He denies pain.  Needs ear drop medication Ofloxacin refilled. )    Subjective     Subjective      Tristan Hillman presents to [facility] for ear irritation.    Was getting replacement hearing aids yesterday and was told his right ear looked infected.   He states it does feel irritated with water but other than this it is not painful.   He uses q tips and does notice yellowish drainage on q-tip.  Has been prescribed ofloxacin in the past and started again, hasn't noticed much of a difference.   Denies any ear pain, fever, chills, congestion, or rhinorrhea.     Objective     Objective   Vital Signs:   /86   Pulse 62   Temp 97.8 °F (36.6 °C) (Tympanic)   Ht 180.3 cm (70.98\")   Wt 106 kg (234 lb 1.9 oz)   SpO2 97%   BMI 32.67 kg/m²         Physical Exam  Vitals and nursing note reviewed.   Constitutional:       General: He is not in acute distress.     Appearance: Normal appearance. He is not toxic-appearing.   HENT:      Right Ear: Decreased hearing noted. Tympanic membrane is perforated.      Left Ear: Decreased hearing noted.      Ears:      Comments: Mild bilateral erythema to ear canal.  Eyes:      Pupils: Pupils are equal, round, and reactive to light.   Neck:      Vascular: No carotid bruit.   Cardiovascular:      Rate and Rhythm: Normal rate and regular rhythm.      Heart sounds: Normal heart sounds. No murmur heard.  Pulmonary:      Effort: Pulmonary effort is normal. No respiratory distress.      Breath sounds: Normal breath sounds. No wheezing.   Abdominal:      General: Bowel sounds are normal. There is no distension.      Palpations: Abdomen is soft.      Tenderness: There is no " abdominal tenderness.   Musculoskeletal:      Cervical back: Neck supple. No tenderness.   Skin:     General: Skin is warm and dry.      Findings: No rash.   Neurological:      General: No focal deficit present.      Mental Status: He is alert.   Psychiatric:         Mood and Affect: Mood normal.         Behavior: Behavior normal.          Assessment / Plan      Assessment & Plan   Problem List Items Addressed This Visit    None  Visit Diagnoses       Acute diffuse otitis externa of both ears    -  Primary    Discontinue olfloxacin and begin Ciprodex. Discouraged use of q-tips. Follow-up if not improving.           This note accurately reflects work and decisions made by me.    Follow Up   Return if symptoms worsen or fail to improve.  Patient was given instructions and counseling regarding his condition or for health maintenance advice. Please see specific information pulled into the AVS if appropriate.     LOUISE Mooney  [facility]

## 2024-04-18 ENCOUNTER — OFFICE VISIT (OUTPATIENT)
Dept: INTERNAL MEDICINE | Facility: CLINIC | Age: 79
End: 2024-04-18
Payer: MEDICARE

## 2024-04-18 VITALS
BODY MASS INDEX: 32.06 KG/M2 | HEIGHT: 71 IN | WEIGHT: 229 LBS | OXYGEN SATURATION: 98 % | RESPIRATION RATE: 16 BRPM | HEART RATE: 47 BPM | DIASTOLIC BLOOD PRESSURE: 56 MMHG | SYSTOLIC BLOOD PRESSURE: 114 MMHG | TEMPERATURE: 97.1 F

## 2024-04-18 DIAGNOSIS — F51.01 PRIMARY INSOMNIA: ICD-10-CM

## 2024-04-18 DIAGNOSIS — R00.1 BRADYCARDIA: Primary | ICD-10-CM

## 2024-04-18 PROCEDURE — 3074F SYST BP LT 130 MM HG: CPT | Performed by: INTERNAL MEDICINE

## 2024-04-18 PROCEDURE — G2211 COMPLEX E/M VISIT ADD ON: HCPCS | Performed by: INTERNAL MEDICINE

## 2024-04-18 PROCEDURE — 3078F DIAST BP <80 MM HG: CPT | Performed by: INTERNAL MEDICINE

## 2024-04-18 PROCEDURE — 99214 OFFICE O/P EST MOD 30 MIN: CPT | Performed by: INTERNAL MEDICINE

## 2024-04-18 PROCEDURE — 93000 ELECTROCARDIOGRAM COMPLETE: CPT | Performed by: INTERNAL MEDICINE

## 2024-04-18 RX ORDER — TRAZODONE HYDROCHLORIDE 50 MG/1
50 TABLET ORAL NIGHTLY
Qty: 30 TABLET | Refills: 3 | Status: SHIPPED | OUTPATIENT
Start: 2024-04-18

## 2024-04-18 NOTE — PROGRESS NOTES
Subjective     Patient ID: Tristan Hillman is a 79 y.o. male. Patient is here for management of multiple medical problems.     Chief Complaint   Patient presents with    Insomnia    Shortness of Breath     History of Present Illness   Short of breath.   Worsening. X 3 months.      The following portions of the patient's history were reviewed and updated as appropriate: allergies, current medications, past family history, past medical history, past social history, past surgical history and problem list.    Review of Systems    Current Outpatient Medications:     apixaban (ELIQUIS) 5 MG tablet tablet, Take 1 tablet by mouth 2 (Two) Times a Day., Disp: 60 tablet, Rfl: 11    aspirin 81 MG tablet, Take 1 tablet by mouth Daily., Disp: 30 tablet, Rfl: 0    clotrimazole-betamethasone (Lotrisone) 1-0.05 % cream, Apply 1 application  topically to the appropriate area as directed 2 (Two) Times a Day., Disp: 45 g, Rfl: 0    coenzyme Q10 100 MG capsule, Take 1 capsule by mouth Daily., Disp: , Rfl:     escitalopram (LEXAPRO) 10 MG tablet, Take 1 tablet by mouth Daily., Disp: 90 tablet, Rfl: 1    hydroCHLOROthiazide (HYDRODIURIL) 12.5 MG tablet, TAKE 1 TABLET BY MOUTH DAILY., Disp: 90 tablet, Rfl: 1    hydrOXYzine (ATARAX) 25 MG tablet, Take 1 tablet by mouth 3 (Three) Times a Day As Needed for Anxiety., Disp: 90 tablet, Rfl: 3    lisinopril (PRINIVIL,ZESTRIL) 20 MG tablet, Take 1 tablet by mouth 2 (Two) Times a Day., Disp: 90 tablet, Rfl: 1    nitroglycerin (NITROSTAT) 0.4 MG SL tablet, place 1 tablet under the tongue if needed every 5 minutes fo, Disp: 25 tablet, Rfl: 5    omeprazole (priLOSEC) 20 MG capsule, Take 1 capsule by mouth Daily. MUST HAVE APPOINTMENT FOR FURTHER REFILLS., Disp: 90 capsule, Rfl: 1    rosuvastatin (CRESTOR) 5 MG tablet, Take 1 tablet by mouth Daily., Disp: 90 tablet, Rfl: 3    traZODone (DESYREL) 50 MG tablet, Take 1 tablet by mouth Every Night., Disp: 30 tablet, Rfl: 3    Objective      Blood pressure  "114/56, pulse (!) 47, temperature 97.1 °F (36.2 °C), resp. rate 16, height 180.3 cm (70.98\"), weight 104 kg (229 lb), SpO2 98%.            Physical Exam     General Appearance:    Alert, cooperative, no distress, appears stated age   Head:    Normocephalic, without obvious abnormality, atraumatic   Eyes:    PERRL, conjunctiva/corneas clear, EOM's intact   Ears:    Normal TM's and external ear canals, both ears   Nose:   Nares normal, septum midline, mucosa normal, no drainage   or sinus tenderness   Throat:   Lips, mucosa, and tongue normal; teeth and gums normal   Neck:   Supple, symmetrical, trachea midline, no adenopathy;        thyroid:  No enlargement/tenderness/nodules; no carotid    bruit or JVD   Back:     Symmetric, no curvature, ROM normal, no CVA tenderness   Lungs:     Clear to auscultation bilaterally, respirations unlabored   Chest wall:    No tenderness or deformity   Heart:    Regular rate and rhythm, S1 and S2 normal, no murmur,        rub or gallop   Abdomen:     Soft, non-tender, bowel sounds active all four quadrants,     no masses, no organomegaly   Extremities:   Extremities normal, atraumatic, no cyanosis or edema   Pulses:   2+ and symmetric all extremities   Skin:   Skin color, texture, turgor normal, no rashes or lesions   Lymph nodes:   Cervical, supraclavicular, and axillary nodes normal   Neurologic:   CNII-XII intact. Normal strength, sensation and reflexes       throughout      Results for orders placed or performed in visit on 04/18/24   T4, Free    Specimen: Blood   Result Value Ref Range    Free T4 1.19 0.93 - 1.70 ng/dL   TSH    Specimen: Blood   Result Value Ref Range    TSH 1.190 0.270 - 4.200 uIU/mL   Comprehensive Metabolic Panel    Specimen: Blood   Result Value Ref Range    Glucose 95 65 - 99 mg/dL    BUN 24 (H) 8 - 23 mg/dL    Creatinine 1.29 (H) 0.76 - 1.27 mg/dL    EGFR Result 57.1 (L) >60.0 mL/min/1.73    BUN/Creatinine Ratio 18.6 7.0 - 25.0    Sodium 143 136 - 145 mmol/L "    Potassium 4.6 3.5 - 5.2 mmol/L    Chloride 106 98 - 107 mmol/L    Total CO2 25.5 22.0 - 29.0 mmol/L    Calcium 9.4 8.6 - 10.5 mg/dL    Total Protein 6.5 6.0 - 8.5 g/dL    Albumin 4.2 3.5 - 5.2 g/dL    Globulin 2.3 gm/dL    A/G Ratio 1.8 g/dL    Total Bilirubin 0.2 0.0 - 1.2 mg/dL    Alkaline Phosphatase 59 39 - 117 U/L    AST (SGOT) 20 1 - 40 U/L    ALT (SGPT) 19 1 - 41 U/L   CBC & Differential    Specimen: Blood   Result Value Ref Range    WBC 7.42 3.40 - 10.80 10*3/mm3    RBC 4.65 4.14 - 5.80 10*6/mm3    Hemoglobin 13.9 13.0 - 17.7 g/dL    Hematocrit 41.9 37.5 - 51.0 %    MCV 90.1 79.0 - 97.0 fL    MCH 29.9 26.6 - 33.0 pg    MCHC 33.2 31.5 - 35.7 g/dL    RDW 12.0 (L) 12.3 - 15.4 %    Platelets 190 140 - 450 10*3/mm3    Neutrophil Rel % 56.5 42.7 - 76.0 %    Lymphocyte Rel % 27.9 19.6 - 45.3 %    Monocyte Rel % 11.6 5.0 - 12.0 %    Eosinophil Rel % 2.8 0.3 - 6.2 %    Basophil Rel % 0.9 0.0 - 1.5 %    Neutrophils Absolute 4.19 1.70 - 7.00 10*3/mm3    Lymphocytes Absolute 2.07 0.70 - 3.10 10*3/mm3    Monocytes Absolute 0.86 0.10 - 0.90 10*3/mm3    Eosinophils Absolute 0.21 0.00 - 0.40 10*3/mm3    Basophils Absolute 0.07 0.00 - 0.20 10*3/mm3    Immature Granulocyte Rel % 0.3 0.0 - 0.5 %    Immature Grans Absolute 0.02 0.00 - 0.05 10*3/mm3    nRBC 0.0 0.0 - 0.2 /100 WBC         Assessment & Plan   Lungs clear to bases. HR slow.  Dizzy and soa up walking. This appears to all be related to bradycardia.  On blood thinners. Will get cbc in evaent of profound anemia.    Will need to move apt up with Dr Garcia.     Pt reoprts he can vagal down a fast hr with a cough.     Hold metoprolol 12.5 mg    Insomnia. Melatonin.   Tried benadryl no help.  Hydroxazine. Not working with sleep.     Pt has had a fall with the dizzinss in the past.       ECG 12 Lead    Date/Time: 4/19/2024 8:05 AM  Performed by: Taz Garcia MD    Authorized by: Taz Garcia MD  Comparison: compared with previous ECG from 12/29/2023  Comparison  to previous ECG: Prior with LAD and pac. Now just SB rate 45 with LAD.  Rhythm: sinus bradycardia  Rate: bradycardic  Q waves: II and III    QRS axis: left    Clinical impression: abnormal EKG           Diagnoses and all orders for this visit:    1. Bradycardia (Primary)  -     Ambulatory Referral to Cardiology  -     T4, Free  -     TSH  -     Comprehensive Metabolic Panel  -     CBC & Differential    2. Primary insomnia  -     traZODone (DESYREL) 50 MG tablet; Take 1 tablet by mouth Every Night.  Dispense: 30 tablet; Refill: 3  -     T4, Free  -     TSH  -     Comprehensive Metabolic Panel  -     CBC & Differential    Other orders  -     ECG 12 Lead      Return in about 3 months (around 7/18/2024).          There are no Patient Instructions on file for this visit.     Taz Garcia MD    Assessment & Plan

## 2024-04-19 DIAGNOSIS — N28.9 ACUTE RENAL INSUFFICIENCY: Primary | ICD-10-CM

## 2024-04-19 LAB
ALBUMIN SERPL-MCNC: 4.2 G/DL (ref 3.5–5.2)
ALBUMIN/GLOB SERPL: 1.8 G/DL
ALP SERPL-CCNC: 59 U/L (ref 39–117)
ALT SERPL-CCNC: 19 U/L (ref 1–41)
AST SERPL-CCNC: 20 U/L (ref 1–40)
BASOPHILS # BLD AUTO: 0.07 10*3/MM3 (ref 0–0.2)
BASOPHILS NFR BLD AUTO: 0.9 % (ref 0–1.5)
BILIRUB SERPL-MCNC: 0.2 MG/DL (ref 0–1.2)
BUN SERPL-MCNC: 24 MG/DL (ref 8–23)
BUN/CREAT SERPL: 18.6 (ref 7–25)
CALCIUM SERPL-MCNC: 9.4 MG/DL (ref 8.6–10.5)
CHLORIDE SERPL-SCNC: 106 MMOL/L (ref 98–107)
CO2 SERPL-SCNC: 25.5 MMOL/L (ref 22–29)
CREAT SERPL-MCNC: 1.29 MG/DL (ref 0.76–1.27)
EGFRCR SERPLBLD CKD-EPI 2021: 57.1 ML/MIN/1.73
EOSINOPHIL # BLD AUTO: 0.21 10*3/MM3 (ref 0–0.4)
EOSINOPHIL NFR BLD AUTO: 2.8 % (ref 0.3–6.2)
ERYTHROCYTE [DISTWIDTH] IN BLOOD BY AUTOMATED COUNT: 12 % (ref 12.3–15.4)
GLOBULIN SER CALC-MCNC: 2.3 GM/DL
GLUCOSE SERPL-MCNC: 95 MG/DL (ref 65–99)
HCT VFR BLD AUTO: 41.9 % (ref 37.5–51)
HGB BLD-MCNC: 13.9 G/DL (ref 13–17.7)
IMM GRANULOCYTES # BLD AUTO: 0.02 10*3/MM3 (ref 0–0.05)
IMM GRANULOCYTES NFR BLD AUTO: 0.3 % (ref 0–0.5)
LYMPHOCYTES # BLD AUTO: 2.07 10*3/MM3 (ref 0.7–3.1)
LYMPHOCYTES NFR BLD AUTO: 27.9 % (ref 19.6–45.3)
MCH RBC QN AUTO: 29.9 PG (ref 26.6–33)
MCHC RBC AUTO-ENTMCNC: 33.2 G/DL (ref 31.5–35.7)
MCV RBC AUTO: 90.1 FL (ref 79–97)
MONOCYTES # BLD AUTO: 0.86 10*3/MM3 (ref 0.1–0.9)
MONOCYTES NFR BLD AUTO: 11.6 % (ref 5–12)
NEUTROPHILS # BLD AUTO: 4.19 10*3/MM3 (ref 1.7–7)
NEUTROPHILS NFR BLD AUTO: 56.5 % (ref 42.7–76)
NRBC BLD AUTO-RTO: 0 /100 WBC (ref 0–0.2)
PLATELET # BLD AUTO: 190 10*3/MM3 (ref 140–450)
POTASSIUM SERPL-SCNC: 4.6 MMOL/L (ref 3.5–5.2)
PROT SERPL-MCNC: 6.5 G/DL (ref 6–8.5)
RBC # BLD AUTO: 4.65 10*6/MM3 (ref 4.14–5.8)
SODIUM SERPL-SCNC: 143 MMOL/L (ref 136–145)
T4 FREE SERPL-MCNC: 1.19 NG/DL (ref 0.93–1.7)
TSH SERPL DL<=0.005 MIU/L-ACNC: 1.19 UIU/ML (ref 0.27–4.2)
WBC # BLD AUTO: 7.42 10*3/MM3 (ref 3.4–10.8)

## 2024-04-19 NOTE — ADDENDUM NOTE
Addended by: ERIKA BENAVIDES on: 4/19/2024 08:07 AM     Modules accepted: Orders, Level of Service

## 2024-04-30 NOTE — TELEPHONE ENCOUNTER
Rx Refill Note  Requested Prescriptions     Pending Prescriptions Disp Refills    omeprazole (priLOSEC) 20 MG capsule [Pharmacy Med Name: OMEPRAZOLE DR 20 MG CAPSULE 20 Capsule] 90 capsule 1     Sig: Take 1 capsule by mouth Daily. MUST HAVE APPOINTMENT FOR FURTHER REFILLS.      Last office visit with prescribing clinician: 4/18/2024   Last telemedicine visit with prescribing clinician: Visit date not found   Next office visit with prescribing clinician: 7/22/2024     Frida Ennis MA  04/30/24, 13:04 EDT

## 2024-05-01 RX ORDER — OMEPRAZOLE 20 MG/1
20 CAPSULE, DELAYED RELEASE ORAL DAILY
Qty: 90 CAPSULE | Refills: 1 | Status: SHIPPED | OUTPATIENT
Start: 2024-05-01

## 2024-05-10 LAB
BUN SERPL-MCNC: 16 MG/DL (ref 8–23)
BUN/CREAT SERPL: 15.4 (ref 7–25)
CALCIUM SERPL-MCNC: 9.5 MG/DL (ref 8.6–10.5)
CHLORIDE SERPL-SCNC: 105 MMOL/L (ref 98–107)
CO2 SERPL-SCNC: 28 MMOL/L (ref 22–29)
CREAT SERPL-MCNC: 1.04 MG/DL (ref 0.76–1.27)
EGFRCR SERPLBLD CKD-EPI 2021: 74 ML/MIN/1.73
GLUCOSE SERPL-MCNC: 111 MG/DL (ref 65–99)
POTASSIUM SERPL-SCNC: 4.3 MMOL/L (ref 3.5–5.2)
SODIUM SERPL-SCNC: 142 MMOL/L (ref 136–145)

## 2024-05-15 ENCOUNTER — OFFICE VISIT (OUTPATIENT)
Dept: INTERNAL MEDICINE | Facility: CLINIC | Age: 79
End: 2024-05-15
Payer: MEDICARE

## 2024-05-15 VITALS
OXYGEN SATURATION: 94 % | DIASTOLIC BLOOD PRESSURE: 80 MMHG | WEIGHT: 238 LBS | BODY MASS INDEX: 33.32 KG/M2 | HEART RATE: 97 BPM | SYSTOLIC BLOOD PRESSURE: 144 MMHG | HEIGHT: 71 IN

## 2024-05-15 DIAGNOSIS — R60.9 EDEMA, UNSPECIFIED TYPE: Primary | ICD-10-CM

## 2024-05-15 PROCEDURE — 3077F SYST BP >= 140 MM HG: CPT | Performed by: NURSE PRACTITIONER

## 2024-05-15 PROCEDURE — 1160F RVW MEDS BY RX/DR IN RCRD: CPT | Performed by: NURSE PRACTITIONER

## 2024-05-15 PROCEDURE — 3079F DIAST BP 80-89 MM HG: CPT | Performed by: NURSE PRACTITIONER

## 2024-05-15 PROCEDURE — 1159F MED LIST DOCD IN RCRD: CPT | Performed by: NURSE PRACTITIONER

## 2024-05-15 PROCEDURE — 99213 OFFICE O/P EST LOW 20 MIN: CPT | Performed by: NURSE PRACTITIONER

## 2024-05-15 PROCEDURE — 1126F AMNT PAIN NOTED NONE PRSNT: CPT | Performed by: NURSE PRACTITIONER

## 2024-05-15 RX ORDER — FUROSEMIDE 20 MG/1
20 TABLET ORAL DAILY PRN
Qty: 30 TABLET | Refills: 0 | Status: SHIPPED | OUTPATIENT
Start: 2024-05-15

## 2024-05-15 NOTE — PROGRESS NOTES
Office Note     Name: Tristan Hillman    : 1945     MRN: [id number]     Chief Complaint  Follow-up (Swelling, feet & legs)    Subjective     History of Present Illness:  Tristan Hillman is a 79 y.o. male who presents today for edema in his bilateral ankles developing three months ago. The patient has a history of 11 stent placements and takes Eliquis daily. He reports shortness of breath, denies chest pain. He reports more shortness of breath in the spring and contributes it to allergies.he denies history of asthma or COPD. No history of smoking. He is next appointment with Dr. Edouard, Cardiology is in 2024. He has a history of bradycardia which they are monitoring.    Review of Systems:   Review of Systems    Past Medical History:   Past Medical History:   Diagnosis Date    Ankle sprain     Anxiety     Arthritis of back     Arthritis of neck     Bursitis of hip     Coronary artery disease     GERD (gastroesophageal reflux disease)     Hip arthrosis     History of insomnia     Hyperlipidemia     Hypertension     Low back pain     Low back strain     Neck strain     Periarthritis of shoulder     Rotator cuff syndrome     Tennis elbow        Past Surgical History:   Past Surgical History:   Procedure Laterality Date    CORONARY STENT PLACEMENT         Immunizations:   Immunization History   Administered Date(s) Administered    Fluad Quad 65+ 10/06/2020    Fluzone (or Fluarix & Flulaval for VFC) >6mos 2019    Fluzone High Dose =>65 Years ([employer] ONLY) 2018    Fluzone High-Dose 65+yrs 2021, 10/13/2022, 2023    Pneumococcal Conjugate 13-Valent (PCV13) 2016, 2017    Pneumococcal Polysaccharide (PPSV23) 2018    Td (TDVAX) 2018    Zostavax 2017        Medications:     Current Outpatient Medications:     apixaban (ELIQUIS) 5 MG tablet tablet, Take 1 tablet by mouth 2 (Two) Times a Day., Disp: 60 tablet, Rfl: 11    aspirin 81 MG tablet, Take 1 tablet by mouth  Daily., Disp: 30 tablet, Rfl: 0    coenzyme Q10 100 MG capsule, Take 1 capsule by mouth Daily., Disp: , Rfl:     escitalopram (LEXAPRO) 10 MG tablet, Take 1 tablet by mouth Daily., Disp: 90 tablet, Rfl: 1    hydroCHLOROthiazide (HYDRODIURIL) 12.5 MG tablet, TAKE 1 TABLET BY MOUTH DAILY., Disp: 90 tablet, Rfl: 1    hydrOXYzine (ATARAX) 25 MG tablet, Take 1 tablet by mouth 3 (Three) Times a Day As Needed for Anxiety., Disp: 90 tablet, Rfl: 3    lisinopril (PRINIVIL,ZESTRIL) 20 MG tablet, Take 1 tablet by mouth 2 (Two) Times a Day., Disp: 90 tablet, Rfl: 1    nitroglycerin (NITROSTAT) 0.4 MG SL tablet, place 1 tablet under the tongue if needed every 5 minutes fo, Disp: 25 tablet, Rfl: 5    omeprazole (priLOSEC) 20 MG capsule, TAKE 1 CAPSULE BY MOUTH DAILY. MUST HAVE APPOINTMENT FOR FURTHER REFILLS., Disp: 90 capsule, Rfl: 1    rosuvastatin (CRESTOR) 5 MG tablet, Take 1 tablet by mouth Daily., Disp: 90 tablet, Rfl: 3    traZODone (DESYREL) 50 MG tablet, Take 1 tablet by mouth Every Night., Disp: 30 tablet, Rfl: 3    clotrimazole-betamethasone (Lotrisone) 1-0.05 % cream, Apply 1 application  topically to the appropriate area as directed 2 (Two) Times a Day. (Patient not taking: Reported on 5/15/2024), Disp: 45 g, Rfl: 0    furosemide (Lasix) 20 MG tablet, Take 1 tablet by mouth Daily As Needed (edema)., Disp: 30 tablet, Rfl: 0    Allergies:   No Known Allergies    Family History:   Family History   Problem Relation Age of Onset    Heart attack Mother     Heart attack Father     Cancer Sister     Diabetes Sister     Hypertension Sister     Heart attack Brother        Social History:   Social History     Socioeconomic History    Marital status:    Tobacco Use    Smoking status: Never     Passive exposure: Never    Smokeless tobacco: Current    Tobacco comments:     Chews on cigars   Vaping Use    Vaping status: Never Used   Substance and Sexual Activity    Alcohol use: Not Currently    Drug use: No    Sexual  "activity: Not Currently     Partners: Female         Objective     Vital Signs  /80   Pulse 97   Ht 180.3 cm (71\")   Wt 108 kg (238 lb)   SpO2 94%   BMI 33.19 kg/m²   Estimated body mass index is 33.19 kg/m² as calculated from the following:    Height as of this encounter: 180.3 cm (71\").    Weight as of this encounter: 108 kg (238 lb).            Physical Exam  Vitals and nursing note reviewed.   Constitutional:       General: He is not in acute distress.     Appearance: Normal appearance. He is not ill-appearing or toxic-appearing.   HENT:      Head: Normocephalic and atraumatic.   Cardiovascular:      Rate and Rhythm: Normal rate and regular rhythm.      Heart sounds: Normal heart sounds.   Pulmonary:      Effort: Pulmonary effort is normal.      Breath sounds: Normal breath sounds.   Musculoskeletal:         General: Normal range of motion.      Cervical back: Normal range of motion and neck supple. No rigidity or tenderness.      Right lower leg: Edema present.      Left lower leg: Edema present.      Comments: +1 bilateral lower ankle and foot edema.  Toes pink, cap refill brisk.   Lymphadenopathy:      Cervical: No cervical adenopathy.   Skin:     General: Skin is warm.   Neurological:      General: No focal deficit present.      Mental Status: He is alert.   Psychiatric:         Mood and Affect: Mood normal.         Behavior: Behavior normal.         Thought Content: Thought content normal.         Judgment: Judgment normal.          Assessment and Plan       ECG 12 Lead    Date/Time: 5/16/2024 5:13 PM  Performed by: Brant Quijano APRN    Authorized by: Brant Quijano APRN  Rhythm: sinus bradycardia  Comments: 1st degree AV block with occasional premature ventricular complexes.  Abnormal ECG            Lab Results (last 72 hours)       ** No results found for the last 72 hours. **                  Diagnoses and all orders for this visit:    1. Edema, unspecified type (Primary)  -     ECG 12 " Lead  -     furosemide (Lasix) 20 MG tablet; Take 1 tablet by mouth Daily As Needed (edema).  Dispense: 30 tablet; Refill: 0    Reviewed exam findings with the patient.  Reviewed echo from January 2024.  Initiate furosemide 20 mg tablet, 1 tablet by mouth daily for 2 to 3 days, then prn.  Elevate legs, consider support hose.  Keep the upcoming appointment with cardiology.  Recommend patient notify his cardiologist of the new onset edema.      Follow Up  Return if symptoms worsen or fail to improve.    LOUISE Sheffield [facility]  [address]  [place] [facility] [id number] [phone]

## 2024-05-16 PROCEDURE — 93000 ELECTROCARDIOGRAM COMPLETE: CPT | Performed by: NURSE PRACTITIONER

## 2024-05-29 RX ORDER — LISINOPRIL 20 MG/1
20 TABLET ORAL 2 TIMES DAILY
Qty: 90 TABLET | Refills: 1 | Status: SHIPPED | OUTPATIENT
Start: 2024-05-29

## 2024-06-14 ENCOUNTER — OFFICE VISIT (OUTPATIENT)
Dept: CARDIOLOGY | Facility: CLINIC | Age: 79
End: 2024-06-14
Payer: MEDICARE

## 2024-06-14 VITALS
WEIGHT: 238.8 LBS | BODY MASS INDEX: 33.43 KG/M2 | HEART RATE: 56 BPM | OXYGEN SATURATION: 95 % | SYSTOLIC BLOOD PRESSURE: 142 MMHG | DIASTOLIC BLOOD PRESSURE: 82 MMHG | HEIGHT: 71 IN

## 2024-06-14 DIAGNOSIS — R60.9 EDEMA, UNSPECIFIED TYPE: ICD-10-CM

## 2024-06-14 RX ORDER — ROSUVASTATIN CALCIUM 5 MG/1
5 TABLET, COATED ORAL DAILY
Qty: 90 TABLET | Refills: 3 | Status: SHIPPED | OUTPATIENT
Start: 2024-06-14

## 2024-06-14 RX ORDER — FUROSEMIDE 40 MG/1
40 TABLET ORAL DAILY PRN
Qty: 30 TABLET | Refills: 3 | Status: SHIPPED | OUTPATIENT
Start: 2024-06-14

## 2024-06-14 NOTE — LETTER
June 16, 2024       No Recipients    Patient: Tristan Hillman   YOB: 1945   Date of Visit: 6/14/2024     Dear Kishore Edouard MD:       This nat is again to see you in a week or 2.  His biggest complaint is lower extremity swelling.  I put him on Lasix 40 mg for now until he sees you.  Going to check labs the day he comes to [place].  Just a heads up to see if we need to change anything at that point.       Sincerely,        Curtis Garcia MD        CC:   No Recipients    Curtis Garcia MD  06/16/24 1402  Sign when Signing Visit         Cardiac Electrophysiology Outpatient Note  [facility]    Office Visit     Tristan Hillman  [id number]  09/01/2021    Primary Care Physician: Taz Garcia MD    Referred By: No ref. provider found    Subjective     Chief Complaint   Patient presents with   • Paroxysmal atrial fibrillation       History of Present Illness:   Tristan Hillman is a 79 y.o. male who presents to my electrophysiology clinic for follow-up of palpitations.  He previously wore for an event monitor showing several runs of SVT.    Since last visit he is overall done well.  He has not had significant palpitations that have been bothersome to him.  Has noted some bradycardia, noting that his heart rate was in the 40s at his PCP office.  Again, he does not think he has any symptoms with this.  Main complaint is significant lower extremity swelling.  Was prescribed Lasix as needed, but has not been taking that much.  Has mostly been taking his hydrochlorothiazide.      Past Medical History:   Diagnosis Date   • Ankle sprain    • Anxiety    • Arthritis of back    • Arthritis of neck    • Bursitis of hip    • Coronary artery disease    • GERD (gastroesophageal reflux disease)    • Hip arthrosis    • History of insomnia    • Hyperlipidemia    • Hypertension    • Low back pain    • Low back strain    • Neck strain    • Periarthritis of shoulder    • Rotator cuff syndrome     • Tennis elbow        Past Surgical History:   Procedure Laterality Date   • CORONARY STENT PLACEMENT         Family History   Problem Relation Age of Onset   • Heart attack Mother    • Heart attack Father    • Cancer Sister    • Diabetes Sister    • Hypertension Sister    • Heart attack Brother        Social History     Socioeconomic History   • Marital status:    Tobacco Use   • Smoking status: Never     Passive exposure: Never   • Smokeless tobacco: Current   • Tobacco comments:     Chews on cigars   Vaping Use   • Vaping status: Never Used   Substance and Sexual Activity   • Alcohol use: Not Currently   • Drug use: No   • Sexual activity: Not Currently     Partners: Female         Current Outpatient Medications:   •  apixaban (ELIQUIS) 5 MG tablet tablet, Take 1 tablet by mouth 2 (Two) Times a Day., Disp: 60 tablet, Rfl: 11  •  aspirin 81 MG tablet, Take 1 tablet by mouth Daily., Disp: 30 tablet, Rfl: 0  •  coenzyme Q10 100 MG capsule, Take 1 capsule by mouth Daily., Disp: , Rfl:   •  escitalopram (LEXAPRO) 10 MG tablet, Take 1 tablet by mouth Daily., Disp: 90 tablet, Rfl: 1  •  furosemide (Lasix) 40 MG tablet, Take 1 tablet by mouth Daily As Needed (edema)., Disp: 30 tablet, Rfl: 3  •  hydrOXYzine (ATARAX) 25 MG tablet, Take 1 tablet by mouth 3 (Three) Times a Day As Needed for Anxiety., Disp: 90 tablet, Rfl: 3  •  lisinopril (PRINIVIL,ZESTRIL) 20 MG tablet, Take 1 tablet by mouth 2 (Two) Times a Day., Disp: 90 tablet, Rfl: 1  •  nitroglycerin (NITROSTAT) 0.4 MG SL tablet, place 1 tablet under the tongue if needed every 5 minutes fo, Disp: 25 tablet, Rfl: 5  •  omeprazole (priLOSEC) 20 MG capsule, TAKE 1 CAPSULE BY MOUTH DAILY. MUST HAVE APPOINTMENT FOR FURTHER REFILLS., Disp: 90 capsule, Rfl: 1  •  rosuvastatin (CRESTOR) 5 MG tablet, Take 1 tablet by mouth Daily., Disp: 90 tablet, Rfl: 3  •  traZODone (DESYREL) 50 MG tablet, Take 1 tablet by mouth Every Night., Disp: 30 tablet, Rfl: 3    Allergies:  "  No Known Allergies    Objective   Vital Signs: Blood pressure 142/82, pulse 56, height 180.3 cm (71\"), weight 108 kg (238 lb 12.8 oz), SpO2 95%.    PHYSICAL EXAM  General appearance: Awake, alert, cooperative  Head: Normocephalic, without obvious abnormality, atraumatic  Neck: No JVD  Lungs: Clear to ascultation bilaterally  Heart: Regular rate and rhythm, no murmurs, 2+ LE pulses, no lower extremity swelling  Skin: Skin color, turgor normal, no rashes or lesions  Neurologic: Grossly normal     Lab Results   Component Value Date    GLUCOSE 111 (H) 05/10/2024    CALCIUM 9.5 05/10/2024     05/10/2024    K 4.3 05/10/2024    CO2 28.0 05/10/2024     05/10/2024    BUN 16 05/10/2024    CREATININE 1.04 05/10/2024    EGFRIFAFRI 107 12/21/2021    EGFRIFNONA 88 12/21/2021    BCR 15.4 05/10/2024    ANIONGAP 9 05/25/2016     Lab Results   Component Value Date    WBC 7.42 04/18/2024    HGB 13.9 04/18/2024    HCT 41.9 04/18/2024    MCV 90.1 04/18/2024     04/18/2024     No results found for: \"INR\", \"PROTIME\"  Lab Results   Component Value Date    TSH 1.190 04/18/2024          Results for orders placed during the hospital encounter of 01/31/24    Adult Transthoracic Echo Complete W/ Cont if Necessary Per Protocol    Interpretation Summary  •  Left ventricular systolic function is normal. Calculated left ventricular EF = 62%  •  The right ventricular cavity is dilated.  •  The left atrial cavity is dilated.  •  Left atrial volume is mildly increased.  •  The right atrial cavity is borderline dilated.  •  Estimated right ventricular systolic pressure from tricuspid regurgitation is normal (<35 mmHg). Calculated right ventricular systolic pressure from tricuspid regurgitation is 31 mmHg.         I personally viewed and interpreted the patient's EKG/Telemetry/lab data    Procedures    Tristan Hillman  reports that he has never smoked. He has never been exposed to tobacco smoke. He uses smokeless tobacco..   "       Advance Care Planning  Advance Care Planning: ACP discussion was held with the patient during this visit. Patient does not have an advance directive, declines further assistance.     Assessment & Plan    1. Paroxysmal SVT (supraventricular tachycardia)  Patient was initially referred for episodes of SVT monitor showed episodes of narrow complex SVT, that possibly represented AV node reentry.  He is not having any significant palpitations at this time.  Therefore we will continue to monitor.  Should this worsen we could consider EP study.    2.  Low extremity swelling  Patient has significant lower extremity swelling.  On exam today he has 2-3+ pitting edema bilaterally.  Unsure the etiology of this.  Last echo did show RV and RA dilation, however RVSP was normal and IVC was not dilated.  He was prescribed as needed Lasix but for now has just been taking hydrochlorothiazide.    We will go ahead and start him on 40 mg of Lasix daily for now.  He is scheduled to see Dr. Edouard in about 10 days.  Will have him get a BMP on that day and we will see if Dr. Edouard can adjust his Lasix if needed at that point.  Will also check a urine protein and creatinine to rule out significant proteinuria as a cause of the swelling.    3. Coronary artery disease involving native coronary artery of native heart without angina pectoris  He has history of coronary disease and follows with Dr. Edouard.  No angina currently.     4.  Dyspnea on exertion  Stable, unchanged from prior visit.  Will continue to monitor.  Follow Up:  No follow-ups on file.      Thank you for allowing me to participate in the care of your patient. Please do not hesitate to contact me with additional questions or concerns.      Curtis Garcia M.D.  Cardiac Electrophysiologist  [facility] / [facility]

## 2024-06-16 NOTE — PROGRESS NOTES
Cardiac Electrophysiology Outpatient Note  [facility] at [facility]    Office Visit     Tristan Hillman  [id number]  09/01/2021    Primary Care Physician: Taz Garcia MD    Referred By: No ref. provider found    Subjective     Chief Complaint   Patient presents with    Paroxysmal atrial fibrillation       History of Present Illness:   Tristan Hillman is a 79 y.o. male who presents to my electrophysiology clinic for follow-up of palpitations.  He previously wore for an event monitor showing several runs of SVT.    Since last visit he is overall done well.  He has not had significant palpitations that have been bothersome to him.  Has noted some bradycardia, noting that his heart rate was in the 40s at his PCP office.  Again, he does not think he has any symptoms with this.  Main complaint is significant lower extremity swelling.  Was prescribed Lasix as needed, but has not been taking that much.  Has mostly been taking his hydrochlorothiazide.      Past Medical History:   Diagnosis Date    Ankle sprain     Anxiety     Arthritis of back     Arthritis of neck     Bursitis of hip     Coronary artery disease     GERD (gastroesophageal reflux disease)     Hip arthrosis     History of insomnia     Hyperlipidemia     Hypertension     Low back pain     Low back strain     Neck strain     Periarthritis of shoulder     Rotator cuff syndrome     Tennis elbow        Past Surgical History:   Procedure Laterality Date    CORONARY STENT PLACEMENT         Family History   Problem Relation Age of Onset    Heart attack Mother     Heart attack Father     Cancer Sister     Diabetes Sister     Hypertension Sister     Heart attack Brother        Social History     Socioeconomic History    Marital status:    Tobacco Use    Smoking status: Never     Passive exposure: Never    Smokeless tobacco: Current    Tobacco comments:     Chews on cigars   Vaping Use    Vaping status: Never Used   Substance and  "Sexual Activity    Alcohol use: Not Currently    Drug use: No    Sexual activity: Not Currently     Partners: Female         Current Outpatient Medications:     apixaban (ELIQUIS) 5 MG tablet tablet, Take 1 tablet by mouth 2 (Two) Times a Day., Disp: 60 tablet, Rfl: 11    aspirin 81 MG tablet, Take 1 tablet by mouth Daily., Disp: 30 tablet, Rfl: 0    coenzyme Q10 100 MG capsule, Take 1 capsule by mouth Daily., Disp: , Rfl:     escitalopram (LEXAPRO) 10 MG tablet, Take 1 tablet by mouth Daily., Disp: 90 tablet, Rfl: 1    furosemide (Lasix) 40 MG tablet, Take 1 tablet by mouth Daily As Needed (edema)., Disp: 30 tablet, Rfl: 3    hydrOXYzine (ATARAX) 25 MG tablet, Take 1 tablet by mouth 3 (Three) Times a Day As Needed for Anxiety., Disp: 90 tablet, Rfl: 3    lisinopril (PRINIVIL,ZESTRIL) 20 MG tablet, Take 1 tablet by mouth 2 (Two) Times a Day., Disp: 90 tablet, Rfl: 1    nitroglycerin (NITROSTAT) 0.4 MG SL tablet, place 1 tablet under the tongue if needed every 5 minutes fo, Disp: 25 tablet, Rfl: 5    omeprazole (priLOSEC) 20 MG capsule, TAKE 1 CAPSULE BY MOUTH DAILY. MUST HAVE APPOINTMENT FOR FURTHER REFILLS., Disp: 90 capsule, Rfl: 1    rosuvastatin (CRESTOR) 5 MG tablet, Take 1 tablet by mouth Daily., Disp: 90 tablet, Rfl: 3    traZODone (DESYREL) 50 MG tablet, Take 1 tablet by mouth Every Night., Disp: 30 tablet, Rfl: 3    Allergies:   No Known Allergies    Objective   Vital Signs: Blood pressure 142/82, pulse 56, height 180.3 cm (71\"), weight 108 kg (238 lb 12.8 oz), SpO2 95%.    PHYSICAL EXAM  General appearance: Awake, alert, cooperative  Head: Normocephalic, without obvious abnormality, atraumatic  Neck: No JVD  Lungs: Clear to ascultation bilaterally  Heart: Regular rate and rhythm, no murmurs, 2+ LE pulses, no lower extremity swelling  Skin: Skin color, turgor normal, no rashes or lesions  Neurologic: Grossly normal     Lab Results   Component Value Date    GLUCOSE 111 (H) 05/10/2024    CALCIUM 9.5 " "05/10/2024     05/10/2024    K 4.3 05/10/2024    CO2 28.0 05/10/2024     05/10/2024    BUN 16 05/10/2024    CREATININE 1.04 05/10/2024    EGFRIFAFRI 107 12/21/2021    EGFRIFNONA 88 12/21/2021    BCR 15.4 05/10/2024    ANIONGAP 9 05/25/2016     Lab Results   Component Value Date    WBC 7.42 04/18/2024    HGB 13.9 04/18/2024    HCT 41.9 04/18/2024    MCV 90.1 04/18/2024     04/18/2024     No results found for: \"INR\", \"PROTIME\"  Lab Results   Component Value Date    TSH 1.190 04/18/2024          Results for orders placed during the hospital encounter of 01/31/24    Adult Transthoracic Echo Complete W/ Cont if Necessary Per Protocol    Interpretation Summary    Left ventricular systolic function is normal. Calculated left ventricular EF = 62%    The right ventricular cavity is dilated.    The left atrial cavity is dilated.    Left atrial volume is mildly increased.    The right atrial cavity is borderline dilated.    Estimated right ventricular systolic pressure from tricuspid regurgitation is normal (<35 mmHg). Calculated right ventricular systolic pressure from tricuspid regurgitation is 31 mmHg.         I personally viewed and interpreted the patient's EKG/Telemetry/lab data    Procedures    Tristan Hillman  reports that he has never smoked. He has never been exposed to tobacco smoke. He uses smokeless tobacco..         Advance Care Planning   Advance Care Planning: ACP discussion was held with the patient during this visit. Patient does not have an advance directive, declines further assistance.     Assessment & Plan    1. Paroxysmal SVT (supraventricular tachycardia)  Patient was initially referred for episodes of SVT monitor showed episodes of narrow complex SVT, that possibly represented AV node reentry.  He is not having any significant palpitations at this time.  Therefore we will continue to monitor.  Should this worsen we could consider EP study.    2.  Low extremity swelling  Patient has " significant lower extremity swelling.  On exam today he has 2-3+ pitting edema bilaterally.  Unsure the etiology of this.  Last echo did show RV and RA dilation, however RVSP was normal and IVC was not dilated.  He was prescribed as needed Lasix but for now has just been taking hydrochlorothiazide.    We will go ahead and start him on 40 mg of Lasix daily for now.  He is scheduled to see Dr. Edouard in about 10 days.  Will have him get a BMP on that day and we will see if Dr. Edouard can adjust his Lasix if needed at that point.  Will also check a urine protein and creatinine to rule out significant proteinuria as a cause of the swelling.    3. Coronary artery disease involving native coronary artery of native heart without angina pectoris  He has history of coronary disease and follows with Dr. Edouard.  No angina currently.     4.  Dyspnea on exertion  Stable, unchanged from prior visit.  Will continue to monitor.  Follow Up:  No follow-ups on file.      Thank you for allowing me to participate in the care of your patient. Please do not hesitate to contact me with additional questions or concerns.      Curtis Garcia M.D.  Cardiac Electrophysiologist  [facility] / [facility]

## 2024-06-17 ENCOUNTER — TELEPHONE (OUTPATIENT)
Dept: CARDIOLOGY | Facility: CLINIC | Age: 79
End: 2024-06-17
Payer: MEDICARE

## 2024-06-17 DIAGNOSIS — R60.0 BILATERAL LEG EDEMA: Primary | ICD-10-CM

## 2024-06-17 DIAGNOSIS — Z51.81 ENCOUNTER FOR MEDICATION MONITORING: ICD-10-CM

## 2024-06-17 NOTE — TELEPHONE ENCOUNTER
----- Message from Curtis Garcia sent at 6/16/2024  1:59 PM EDT -----  Can we get the patient to have a BMP, and a urine protein and creatinine at [place] when he goes to see Dr. Guzman

## 2024-06-18 ENCOUNTER — TELEPHONE (OUTPATIENT)
Dept: CARDIOLOGY | Facility: CLINIC | Age: 79
End: 2024-06-18
Payer: MEDICARE

## 2024-06-19 NOTE — PROGRESS NOTES
[facility] Cardiology Office Follow Up Note    Tristan Hillman  [id number]  2024    Primary Care Provider: Taz Garcia MD   Referring Provider: No ref. provider found    Chief Complaint: 1yr F/U CAD, PAF, Aflutter    History of Present Illness:   Mr. Tristan Hillman is a 79 y.o. male who presents to the Cardiology Clinic for routine follow-up.  The patient has a past medical history of coronary artery disease with previous JADE of multiple vessels, paroxysmal atrial fibrillation, hypertension, hyperlipidemia, PAD, and atrial flutter with RVR.  About a week and a half ago, he was seen by electrophysiology at which time he reported lower extremity edema.  Chlorothiazide was discontinued and patient was prescribed daily Lasix at 40 mg.  He presents today for routine follow-up.  The patient reports feeling well today from a cardiac standpoint.  He denies chest pain, dyspnea, palpitations, dizziness, and lower extremity edema.  He offers no other complaints or concerns at this time.    Past Cardiac Testin. Last Coronary Angio: SCANNED - CARDIOLOGY (2020)   2. Prior Stress Testing: None  3. Last Echo: 2024       Left ventricular systolic function is normal. Calculated left ventricular EF = 62%    The right ventricular cavity is dilated.    The left atrial cavity is dilated.    Left atrial volume is mildly increased.    The right atrial cavity is borderline dilated.    Estimated right ventricular systolic pressure from tricuspid regurgitation is normal (<35 mmHg). Calculated right ventricular systolic pressure from tricuspid regurgitation is 31 mmHg.  4. Prior Holter Monitor: 2023     An abnormal monitor study.  Multiple sustained and nonsustained salvos of a narrow complex regular tachycardia with an average heart rate of 150 bpm.  Symptomatic.  15% PAC burden.  Intermittent positive symptom correlation.    Review of Systems:   Review of Systems  As Noted in HPI.   I have  "reviewed and confirmed the accuracy of the ROS as documented by the MA/LPN/RN Renetta Barnett RN    I have reviewed and/or updated the patient's past medical, past surgical, family, social history, problem list and allergies as appropriate.     Medications:     Current Outpatient Medications:     apixaban (ELIQUIS) 5 MG tablet tablet, Take 1 tablet by mouth 2 (Two) Times a Day., Disp: 60 tablet, Rfl: 11    aspirin 81 MG tablet, Take 1 tablet by mouth Daily., Disp: 30 tablet, Rfl: 0    coenzyme Q10 100 MG capsule, Take 1 capsule by mouth Daily., Disp: , Rfl:     escitalopram (LEXAPRO) 10 MG tablet, Take 1 tablet by mouth Daily., Disp: 90 tablet, Rfl: 1    furosemide (Lasix) 40 MG tablet, Take 1 tablet by mouth Daily As Needed (edema)., Disp: 30 tablet, Rfl: 3    hydrOXYzine (ATARAX) 25 MG tablet, Take 1 tablet by mouth 3 (Three) Times a Day As Needed for Anxiety., Disp: 90 tablet, Rfl: 3    lisinopril (PRINIVIL,ZESTRIL) 20 MG tablet, Take 1 tablet by mouth 2 (Two) Times a Day., Disp: 90 tablet, Rfl: 1    nitroglycerin (NITROSTAT) 0.4 MG SL tablet, place 1 tablet under the tongue if needed every 5 minutes fo, Disp: 25 tablet, Rfl: 5    omeprazole (priLOSEC) 20 MG capsule, TAKE 1 CAPSULE BY MOUTH DAILY. MUST HAVE APPOINTMENT FOR FURTHER REFILLS., Disp: 90 capsule, Rfl: 1    rosuvastatin (CRESTOR) 5 MG tablet, Take 1 tablet by mouth Daily., Disp: 90 tablet, Rfl: 3    traZODone (DESYREL) 50 MG tablet, Take 1 tablet by mouth Every Night., Disp: 30 tablet, Rfl: 3    Physical Exam:  Vital Signs:   Vitals:    06/25/24 1449 06/25/24 1459   BP: 112/62 118/62   BP Location: Left arm Left arm   Patient Position: Sitting Standing   Cuff Size: Adult Adult   Pulse: 67 82   Resp: 19    SpO2: 96% 98%   Weight: 107 kg (236 lb 3.2 oz)    Height: 180.3 cm (71\")      Body mass index is 32.94 kg/m².    Physical Exam  Vitals and nursing note reviewed.   Constitutional:       General: He is not in acute distress.     Appearance: He is " obese.   HENT:      Head: Normocephalic and atraumatic.   Neck:      Trachea: Trachea normal.   Cardiovascular:      Rate and Rhythm: Normal rate and regular rhythm.      Pulses: Normal pulses.      Heart sounds: Normal heart sounds. No murmur heard.     No friction rub. No gallop.   Pulmonary:      Effort: Pulmonary effort is normal.      Breath sounds: Normal breath sounds.   Musculoskeletal:      Cervical back: Neck supple.      Right lower leg: No edema.      Left lower leg: No edema.   Skin:     General: Skin is warm and dry.   Neurological:      Mental Status: He is alert and oriented to person, place, and time.   Psychiatric:         Mood and Affect: Mood normal.         Behavior: Behavior normal. Behavior is cooperative.         Thought Content: Thought content does not include suicidal ideation.         Results Review:   I reviewed the patient's new clinical results.    Procedures    Assessment / Plan:   Diagnoses and all orders for this visit:    1. Primary hypertension (Primary)  Acceptable blood pressure    2. Paroxysmal atrial fibrillation  No symptoms  Continue Eliquis for CVA prophylaxis    3. Coronary artery disease involving native coronary artery of native heart without angina pectoris  Stable and angina free    4. Dyslipidemia  Continue statin therapy        Preventative Cardiology:   Tobacco Cessation: N/A Cessation Counseling Provided    Advance Care Planning: ACP discussion was declined by the patient. Patient has an advance directive in EMR which is still valid.      Follow Up:   Return in about 1 year (around 6/25/2025) for Follow-up with Dr. Edouard.      Thank you for allowing me to participate in the care of your patient. Please do not hesitate to contact me with additional questions or concerns.     LOUISE Sandhu

## 2024-06-22 LAB
BUN SERPL-MCNC: 18 MG/DL (ref 8–23)
BUN/CREAT SERPL: 17.6 (ref 7–25)
CALCIUM SERPL-MCNC: 9.5 MG/DL (ref 8.6–10.5)
CHLORIDE SERPL-SCNC: 104 MMOL/L (ref 98–107)
CO2 SERPL-SCNC: 26.8 MMOL/L (ref 22–29)
CREAT SERPL-MCNC: 1.02 MG/DL (ref 0.76–1.27)
CREAT UR-MCNC: 87.5 MG/DL
EGFRCR SERPLBLD CKD-EPI 2021: 75.7 ML/MIN/1.73
GLUCOSE SERPL-MCNC: 96 MG/DL (ref 65–99)
POTASSIUM SERPL-SCNC: 4.3 MMOL/L (ref 3.5–5.2)
PROT UR-MCNC: 8.2 MG/DL
PROT/CREAT UR: 93.7 MG/G CREA (ref 0–200)
SODIUM SERPL-SCNC: 140 MMOL/L (ref 136–145)

## 2024-06-24 DIAGNOSIS — F51.01 PRIMARY INSOMNIA: ICD-10-CM

## 2024-06-25 ENCOUNTER — OFFICE VISIT (OUTPATIENT)
Dept: CARDIOLOGY | Facility: CLINIC | Age: 79
End: 2024-06-25
Payer: MEDICARE

## 2024-06-25 VITALS
DIASTOLIC BLOOD PRESSURE: 62 MMHG | WEIGHT: 236.2 LBS | HEIGHT: 71 IN | OXYGEN SATURATION: 98 % | SYSTOLIC BLOOD PRESSURE: 118 MMHG | HEART RATE: 82 BPM | BODY MASS INDEX: 33.07 KG/M2 | RESPIRATION RATE: 19 BRPM

## 2024-06-25 DIAGNOSIS — E78.5 DYSLIPIDEMIA: ICD-10-CM

## 2024-06-25 DIAGNOSIS — I10 PRIMARY HYPERTENSION: Primary | ICD-10-CM

## 2024-06-25 DIAGNOSIS — I48.0 PAROXYSMAL ATRIAL FIBRILLATION: ICD-10-CM

## 2024-06-25 DIAGNOSIS — I25.10 CORONARY ARTERY DISEASE INVOLVING NATIVE CORONARY ARTERY OF NATIVE HEART WITHOUT ANGINA PECTORIS: ICD-10-CM

## 2024-06-25 PROCEDURE — 99214 OFFICE O/P EST MOD 30 MIN: CPT | Performed by: NURSE PRACTITIONER

## 2024-06-25 PROCEDURE — 1160F RVW MEDS BY RX/DR IN RCRD: CPT | Performed by: NURSE PRACTITIONER

## 2024-06-25 PROCEDURE — 3074F SYST BP LT 130 MM HG: CPT | Performed by: NURSE PRACTITIONER

## 2024-06-25 PROCEDURE — 1159F MED LIST DOCD IN RCRD: CPT | Performed by: NURSE PRACTITIONER

## 2024-06-25 PROCEDURE — 3078F DIAST BP <80 MM HG: CPT | Performed by: NURSE PRACTITIONER

## 2024-06-25 RX ORDER — LISINOPRIL 10 MG/1
10 TABLET ORAL DAILY
Qty: 90 TABLET | Refills: 3 | Status: SHIPPED | OUTPATIENT
Start: 2024-06-25

## 2024-06-28 RX ORDER — TRAZODONE HYDROCHLORIDE 100 MG/1
100 TABLET ORAL NIGHTLY
Qty: 90 TABLET | Refills: 3 | Status: SHIPPED | OUTPATIENT
Start: 2024-06-28

## 2024-06-28 NOTE — TELEPHONE ENCOUNTER
Rx Refill Note  Requested Prescriptions     Pending Prescriptions Disp Refills    traZODone (DESYREL) 50 MG tablet 30 tablet 3     Sig: Take 1 tablet by mouth Every Night.      Last office visit with prescribing clinician: 4/18/2024   Last telemedicine visit with prescribing clinician: Visit date not found   Next office visit with prescribing clinician: 7/22/2024                         Would you like a call back once the refill request has been completed: [] Yes [] No    If the office needs to give you a call back, can they leave a voicemail: [] Yes [] No    Frida Ennis MA  06/28/24, 14:44 EDT

## 2024-06-28 NOTE — TELEPHONE ENCOUNTER
Pts daughter called and stated pt is out of Trazadone. She states he takes 2 nightly. It does help some. Pts daughter states he needs a refill (she mychart messagefrom daughter). Please advise.

## 2024-07-24 ENCOUNTER — OFFICE VISIT (OUTPATIENT)
Dept: FAMILY MEDICINE CLINIC | Facility: CLINIC | Age: 79
End: 2024-07-24
Payer: MEDICARE

## 2024-07-24 VITALS
WEIGHT: 230 LBS | SYSTOLIC BLOOD PRESSURE: 125 MMHG | HEIGHT: 71 IN | BODY MASS INDEX: 32.2 KG/M2 | HEART RATE: 55 BPM | OXYGEN SATURATION: 96 % | DIASTOLIC BLOOD PRESSURE: 80 MMHG

## 2024-07-24 DIAGNOSIS — F41.0 PANIC ATTACK: ICD-10-CM

## 2024-07-24 DIAGNOSIS — I48.0 PAROXYSMAL ATRIAL FIBRILLATION: ICD-10-CM

## 2024-07-24 DIAGNOSIS — G47.00 INSOMNIA, UNSPECIFIED TYPE: ICD-10-CM

## 2024-07-24 DIAGNOSIS — I25.10 CORONARY ARTERY DISEASE INVOLVING NATIVE CORONARY ARTERY OF NATIVE HEART WITHOUT ANGINA PECTORIS: ICD-10-CM

## 2024-07-24 DIAGNOSIS — R00.1 BRADYCARDIA: ICD-10-CM

## 2024-07-24 DIAGNOSIS — K21.9 GASTROESOPHAGEAL REFLUX DISEASE WITHOUT ESOPHAGITIS: ICD-10-CM

## 2024-07-24 DIAGNOSIS — B37.2 SKIN CANDIDIASIS: ICD-10-CM

## 2024-07-24 DIAGNOSIS — F41.1 GENERALIZED ANXIETY DISORDER: Primary | ICD-10-CM

## 2024-07-24 PROCEDURE — 3074F SYST BP LT 130 MM HG: CPT | Performed by: NURSE PRACTITIONER

## 2024-07-24 PROCEDURE — 1159F MED LIST DOCD IN RCRD: CPT | Performed by: NURSE PRACTITIONER

## 2024-07-24 PROCEDURE — 99214 OFFICE O/P EST MOD 30 MIN: CPT | Performed by: NURSE PRACTITIONER

## 2024-07-24 PROCEDURE — G2211 COMPLEX E/M VISIT ADD ON: HCPCS | Performed by: NURSE PRACTITIONER

## 2024-07-24 PROCEDURE — 3079F DIAST BP 80-89 MM HG: CPT | Performed by: NURSE PRACTITIONER

## 2024-07-24 PROCEDURE — 1160F RVW MEDS BY RX/DR IN RCRD: CPT | Performed by: NURSE PRACTITIONER

## 2024-07-24 PROCEDURE — 1126F AMNT PAIN NOTED NONE PRSNT: CPT | Performed by: NURSE PRACTITIONER

## 2024-07-24 RX ORDER — BUSPIRONE HYDROCHLORIDE 5 MG/1
5 TABLET ORAL 2 TIMES DAILY
Qty: 60 TABLET | Refills: 2 | Status: SHIPPED | OUTPATIENT
Start: 2024-07-24

## 2024-07-24 RX ORDER — NYSTATIN 100000 [USP'U]/G
POWDER TOPICAL 3 TIMES DAILY
Qty: 60 G | Refills: 2 | Status: SHIPPED | OUTPATIENT
Start: 2024-07-24

## 2024-07-24 RX ORDER — ALPRAZOLAM 0.5 MG/1
0.5 TABLET ORAL NIGHTLY PRN
Qty: 30 TABLET | Refills: 2 | Status: SHIPPED | OUTPATIENT
Start: 2024-07-24

## 2024-07-24 NOTE — PROGRESS NOTES
New Patient History and Physical      Referring Physician: No ref. provider found    Subjective     Chief Complaint:    Chief Complaint   Patient presents with    Foot Injury     Has blister    Dizziness    Edema       History of Present Illness:   Here as a new patient.  Transferring care from [facility] primary care  Notes anxiety, on Lexapro, still feels wound up and anxious.  Has trouble sleeping.  He is to take alprazolam for years low-dose before bed and did very well with that until his previous primary care physician retired and that was discontinued  Hypertension/HLD/bradycardia/PAF/PAD, follows with cardiology, note reviewed, on aspirin, Eliquis, lisinopril, statin, tolerating  GERD, PPI, controlled  Rash in armpits present several weeks    Review of Systems   Gen- No fevers, chills  CV- No chest pain, palpitations  Resp- No cough, dyspnea  GI- No N/V/D, abd pain  Neuro-No dizziness, headaches    Past Medical History:   Past Medical History:   Diagnosis Date    Ankle sprain     Anxiety     Arthritis of back     Arthritis of neck     Bursitis of hip     Coronary artery disease     GERD (gastroesophageal reflux disease)     Hip arthrosis     History of insomnia     Hyperlipidemia     Hypertension     Low back pain     Low back strain     Neck strain     Periarthritis of shoulder     Rotator cuff syndrome     Tennis elbow        Past Surgical History:  Past Surgical History:   Procedure Laterality Date    CORONARY STENT PLACEMENT         Family History: family history includes Cancer in his sister; Diabetes in his sister; Heart attack in his brother, father, and mother; Heart disease in his mother; Hyperlipidemia in his sister; Hypertension in his mother and sister.    Social History:  reports that he has never smoked. He has never been exposed to tobacco smoke. He uses smokeless tobacco. He reports that he does not currently use alcohol. He reports that he does not use  "drugs.    Medications:    Current Outpatient Medications:     aspirin 81 MG tablet, Take 1 tablet by mouth Daily., Disp: 30 tablet, Rfl: 0    coenzyme Q10 100 MG capsule, Take 1 capsule by mouth Daily., Disp: , Rfl:     lisinopril (PRINIVIL,ZESTRIL) 10 MG tablet, Take 1 tablet by mouth Daily., Disp: 90 tablet, Rfl: 3    nitroglycerin (NITROSTAT) 0.4 MG SL tablet, place 1 tablet under the tongue if needed every 5 minutes fo, Disp: 25 tablet, Rfl: 5    rosuvastatin (CRESTOR) 5 MG tablet, Take 1 tablet by mouth Daily., Disp: 90 tablet, Rfl: 3    ALPRAZolam (Xanax) 0.5 MG tablet, Take 1 tablet by mouth At Night As Needed for Sleep or Anxiety., Disp: 30 tablet, Rfl: 2    apixaban (ELIQUIS) 5 MG tablet tablet, Take 1 tablet by mouth 2 (Two) Times a Day., Disp: 60 tablet, Rfl: 1    busPIRone (BUSPAR) 5 MG tablet, Take 1 tablet by mouth 2 (Two) Times a Day., Disp: 60 tablet, Rfl: 2    escitalopram (LEXAPRO) 10 MG tablet, Take 1 tablet by mouth Daily., Disp: 90 tablet, Rfl: 1    nystatin (MYCOSTATIN) 813753 UNIT/GM powder, Apply  topically to the appropriate area as directed 3 (Three) Times a Day., Disp: 60 g, Rfl: 2    omeprazole (priLOSEC) 20 MG capsule, Take 1 capsule by mouth Daily. MUST HAVE APPOINTMENT FOR FURTHER REFILLS., Disp: 90 capsule, Rfl: 1    Allergies:  No Known Allergies    Objective     Vital Signs:   Vitals:    07/24/24 1109   BP: 125/80   Pulse: 55   SpO2: 96%   Weight: 104 kg (230 lb)   Height: 180.3 cm (71\")     Body mass index is 32.08 kg/m².    Physical Exam:    Physical Exam  Vitals and nursing note reviewed.   Constitutional:       Appearance: He is well-developed.   HENT:      Head: Normocephalic and atraumatic.   Eyes:      Pupils: Pupils are equal, round, and reactive to light.   Cardiovascular:      Rate and Rhythm: Normal rate and regular rhythm.      Heart sounds: Normal heart sounds.   Pulmonary:      Effort: Pulmonary effort is normal.      Breath sounds: Normal breath sounds.   Abdominal: "      General: Bowel sounds are normal. There is no distension.      Palpations: Abdomen is soft.      Tenderness: There is no abdominal tenderness.   Musculoskeletal:      Cervical back: Neck supple.   Skin:     General: Skin is warm and dry.      Findings: Rash (Erythematous rash in axilla) present.   Neurological:      General: No focal deficit present.      Mental Status: He is alert and oriented to person, place, and time.   Psychiatric:         Mood and Affect: Mood normal.         Behavior: Behavior normal.         Assessment / Plan     Assessment/Plan:   Problem List Items Addressed This Visit       Generalized anxiety disorder - Primary    Relevant Medications    busPIRone (BUSPAR) 5 MG tablet    ALPRAZolam (Xanax) 0.5 MG tablet    escitalopram (LEXAPRO) 10 MG tablet    Gastroesophageal reflux disease without esophagitis    Relevant Medications    omeprazole (priLOSEC) 20 MG capsule    Insomnia    Relevant Medications    ALPRAZolam (Xanax) 0.5 MG tablet    Bradycardia    Coronary artery disease involving native coronary artery without angina pectoris    Relevant Medications    nitroglycerin (NITROSTAT) 0.4 MG SL tablet    Paroxysmal atrial fibrillation    Relevant Medications    nitroglycerin (NITROSTAT) 0.4 MG SL tablet     Other Visit Diagnoses       Panic attack        Relevant Medications    busPIRone (BUSPAR) 5 MG tablet    Skin candidiasis        Relevant Medications    nystatin (MYCOSTATIN) 957871 UNIT/GM powder          --Keep follow-up with cardiology who is managing his cardiac conditions, continue current meds  --Start buspirone scheduled for anxiety, add Xanax as needed nightly for sleep and breakthrough anxiety  Controlled Substance  Prescription  As part of this patient's treatment plan I am prescribing controlled substances. The patient has been made aware of appropriate use of such medications, including potential risk of somnolence, limited ability to drive and /or work safely, and potential  for dependence or overdose. It has also been made clear that these medications are for use by this patient only, without concomitant use of alcohol or other substances unless prescribed. Alec reviewed and appropriate       Discussed plan of care in detail with pt today; pt verb understanding and agrees.    I have reviewed pertinent health maintenance applicable to this patient.    Follow up:  6 weeks    Electronically signed by LOUISE Grissom   07/24/2024 11:33 EDT      Please note that portions of this note were completed with a voice recognition program.

## 2024-08-01 NOTE — TELEPHONE ENCOUNTER
Rx Refill Note  Requested Prescriptions     Pending Prescriptions Disp Refills    escitalopram (LEXAPRO) 10 MG tablet 90 tablet 1     Sig: Take 1 tablet by mouth Daily.    omeprazole (priLOSEC) 20 MG capsule 90 capsule 1     Sig: Take 1 capsule by mouth Daily. MUST HAVE APPOINTMENT FOR FURTHER REFILLS.      Last office visit with prescribing clinician: 4/18/2024   Last telemedicine visit with prescribing clinician: Visit date not found   Next office visit with prescribing clinician: Visit date not found                         Would you like a call back once the refill request has been completed: [] Yes [] No    If the office needs to give you a call back, can they leave a voicemail: [] Yes [] No    Mandy Valle MA  08/01/24, 11:11 EDT

## 2024-08-02 RX ORDER — OMEPRAZOLE 20 MG/1
20 CAPSULE, DELAYED RELEASE ORAL DAILY
Qty: 90 CAPSULE | Refills: 1 | Status: SHIPPED | OUTPATIENT
Start: 2024-08-02

## 2024-08-02 RX ORDER — ESCITALOPRAM OXALATE 10 MG/1
10 TABLET ORAL DAILY
Qty: 90 TABLET | Refills: 1 | Status: SHIPPED | OUTPATIENT
Start: 2024-08-02

## 2024-08-13 ENCOUNTER — TELEPHONE (OUTPATIENT)
Dept: CARDIOLOGY | Facility: CLINIC | Age: 79
End: 2024-08-13
Payer: MEDICARE

## 2024-08-13 DIAGNOSIS — I10 PRIMARY HYPERTENSION: ICD-10-CM

## 2024-08-13 RX ORDER — LISINOPRIL 10 MG/1
10 TABLET ORAL 2 TIMES DAILY
Qty: 180 TABLET | Refills: 3 | Status: SHIPPED | OUTPATIENT
Start: 2024-08-13

## 2024-08-13 NOTE — TELEPHONE ENCOUNTER
This was changed to 10 mg once daily at his last cardiology clinic visit based on what I understood the patient to be taking at home.  If he is taking lisinopril 10 mg twice a day, please confirm with daughter and send a new Rx, if necessary.  Thank you!

## 2024-08-13 NOTE — TELEPHONE ENCOUNTER
Caller: Alayna Onofre    Relationship: Emergency Contact    Best call back number: [phone]    Which medication are you concerned about: LISINOPRIL 10 MG    Who prescribed you this medication: LOUISE GILLETTE    When did you start taking this medication: 6.25.24    What are your concerns: PATIENTS DAUGHTER CALLED STATING THAT THE LISINOPRIL WAS SUPPOSED TO BE SWITCHED FROM 40 MG (20 MG TWICE DAILY) TO 20 MG (10 MG TWICE DAILY).  SHE WAS UNAWARE THAT IT WAS SWITCHED TO 10 MG ONCE DAILY AND WAS GIVING HER DAD 10 MG TWICE DAILY AND NOW HE HAS RUN OUT OF THE LISINOPRIL AND HIS INSURANCE WONT REFILL IT BECAUSE HE'S SUPPOSED TO HAVE MORE THAN WHAT HE ACTUALLY HAS. PLEASE SWITCH TO 10 MG TWICE DAILY FOR NEXT TIME AND CONTACT THE PATIENT IF NEED BE. THANK YOU

## 2024-08-13 NOTE — TELEPHONE ENCOUNTER
Pt daughter states that he was previously taking 10 mg in the am and 10 mg in the PM. He was taking a total of 20 mg daily. Pts daughter state that his BP has been running good and he has had no issues. Pts BP has been running 130's/80's

## 2024-09-04 ENCOUNTER — OFFICE VISIT (OUTPATIENT)
Dept: FAMILY MEDICINE CLINIC | Facility: CLINIC | Age: 79
End: 2024-09-04
Payer: MEDICARE

## 2024-09-04 VITALS
BODY MASS INDEX: 32.06 KG/M2 | OXYGEN SATURATION: 97 % | SYSTOLIC BLOOD PRESSURE: 125 MMHG | HEIGHT: 71 IN | DIASTOLIC BLOOD PRESSURE: 80 MMHG | HEART RATE: 55 BPM | WEIGHT: 229 LBS

## 2024-09-04 DIAGNOSIS — R26.9 IMPAIRED GAIT: ICD-10-CM

## 2024-09-04 DIAGNOSIS — F40.298 FEAR OF FALLING: ICD-10-CM

## 2024-09-04 DIAGNOSIS — F41.1 GENERALIZED ANXIETY DISORDER: Primary | ICD-10-CM

## 2024-09-04 DIAGNOSIS — R42 DIZZINESS: ICD-10-CM

## 2024-09-04 DIAGNOSIS — F39 MOOD DISORDER: ICD-10-CM

## 2024-09-04 DIAGNOSIS — F41.0 PANIC ATTACK: ICD-10-CM

## 2024-09-04 PROCEDURE — 1160F RVW MEDS BY RX/DR IN RCRD: CPT | Performed by: NURSE PRACTITIONER

## 2024-09-04 PROCEDURE — 1126F AMNT PAIN NOTED NONE PRSNT: CPT | Performed by: NURSE PRACTITIONER

## 2024-09-04 PROCEDURE — 1159F MED LIST DOCD IN RCRD: CPT | Performed by: NURSE PRACTITIONER

## 2024-09-04 PROCEDURE — G2211 COMPLEX E/M VISIT ADD ON: HCPCS | Performed by: NURSE PRACTITIONER

## 2024-09-04 PROCEDURE — 3074F SYST BP LT 130 MM HG: CPT | Performed by: NURSE PRACTITIONER

## 2024-09-04 PROCEDURE — 3079F DIAST BP 80-89 MM HG: CPT | Performed by: NURSE PRACTITIONER

## 2024-09-04 PROCEDURE — 99214 OFFICE O/P EST MOD 30 MIN: CPT | Performed by: NURSE PRACTITIONER

## 2024-09-04 RX ORDER — HYDROCHLOROTHIAZIDE 12.5 MG/1
CAPSULE ORAL
COMMUNITY

## 2024-09-04 NOTE — PROGRESS NOTES
Subjective     Chief Complaint:    Chief Complaint   Patient presents with   • Dizziness     Pt sts it feels like his head is swimmy and like it's going to blow up.       History of Present Illness:   Continues to have anxiety, nervoursness, irritability, mood swings, easily angered. Buspar did not help. Xanax is helping some nights him sleep. He has poor sleep hygiene and poor expectations for medications. He continues to have dizziness, feels pressure in his head like it could explode, this has been going on for several years, MRI from 2022 reviewed, has been seen by ENT who did not feel mastoid was causing dizziness. He is scared he is going to fall and fells off balance often       Answers submitted by the patient for this visit:  Primary Reason for Visit (Submitted on 9/3/2024)  What is the primary reason for your visit?: Other  Other (Submitted on 9/3/2024)  Please describe your symptoms.: I believe this is a follow up appointment, however my dad contines to say he's dizzy and off balance.  We have had his ears checked, nothing there.  He thinks its some medication he is taking.  But he says he gets a bad headache too.  I would love to find out what is wrong.  He says he is tired of feeling this way, that he cant do anything physically cause hes afraid of falling.  Have you had these symptoms before?: Yes  How long have you been having these symptoms?: Greater than 2 weeks    Review of Systems  Gen- No fevers, chills  CV- No chest pain, palpitations  Resp- No cough, dyspnea  GI- No N/V/D, abd pain  Neuro-No dizziness, headaches      I have reviewed and/or updated the patient's past medical, surgical, family, social history and problem list as appropriate.     Medications:    Current Outpatient Medications:   •  ALPRAZolam (Xanax) 0.5 MG tablet, Take 1 tablet by mouth At Night As Needed for Sleep or Anxiety., Disp: 30 tablet, Rfl: 2  •  apixaban (ELIQUIS) 5 MG tablet tablet, Take 1 tablet by mouth 2 (Two)  "Times a Day., Disp: 60 tablet, Rfl: 1  •  aspirin 81 MG tablet, Take 1 tablet by mouth Daily., Disp: 30 tablet, Rfl: 0  •  coenzyme Q10 100 MG capsule, Take 1 capsule by mouth Daily., Disp: , Rfl:   •  escitalopram (LEXAPRO) 10 MG tablet, Take 1 tablet by mouth Daily., Disp: 90 tablet, Rfl: 1  •  lisinopril (PRINIVIL,ZESTRIL) 10 MG tablet, Take 1 tablet by mouth 2 (Two) Times a Day., Disp: 180 tablet, Rfl: 3  •  nitroglycerin (NITROSTAT) 0.4 MG SL tablet, place 1 tablet under the tongue if needed every 5 minutes fo, Disp: 25 tablet, Rfl: 5  •  nystatin (MYCOSTATIN) 254717 UNIT/GM powder, Apply  topically to the appropriate area as directed 3 (Three) Times a Day., Disp: 60 g, Rfl: 2  •  omeprazole (priLOSEC) 20 MG capsule, Take 1 capsule by mouth Daily. MUST HAVE APPOINTMENT FOR FURTHER REFILLS., Disp: 90 capsule, Rfl: 1  •  rosuvastatin (CRESTOR) 5 MG tablet, Take 1 tablet by mouth Daily., Disp: 90 tablet, Rfl: 3  •  Cariprazine HCl (Vraylar) 1.5 MG capsule capsule, Take 1 capsule by mouth Daily., Disp: 30 capsule, Rfl: 1  •  hydroCHLOROthiazide (MICROZIDE) 12.5 MG capsule, , Disp: , Rfl:     Allergies:  No Known Allergies    Objective     Vital Signs:   Vitals:    09/04/24 1334   BP: 125/80   Pulse: 55   SpO2: 97%   Weight: 104 kg (229 lb)   Height: 180.3 cm (71\")     Body mass index is 31.94 kg/m².    Physical Exam:    Physical Exam  Vitals and nursing note reviewed.   Constitutional:       Appearance: He is well-developed.   HENT:      Head: Normocephalic and atraumatic.   Eyes:      Pupils: Pupils are equal, round, and reactive to light.   Cardiovascular:      Rate and Rhythm: Normal rate and regular rhythm.      Heart sounds: Normal heart sounds.   Pulmonary:      Effort: Pulmonary effort is normal.      Breath sounds: Normal breath sounds.   Abdominal:      General: Bowel sounds are normal. There is no distension.      Palpations: Abdomen is soft.      Tenderness: There is no abdominal tenderness. "   Musculoskeletal:      Cervical back: Neck supple.   Skin:     General: Skin is warm and dry.   Neurological:      General: No focal deficit present.      Mental Status: He is alert and oriented to person, place, and time.      Motor: Weakness present.      Gait: Gait abnormal.   Psychiatric:         Mood and Affect: Mood is anxious.         Behavior: Behavior normal.         Assessment / Plan     Assessment/Plan:   Problem List Items Addressed This Visit       Generalized anxiety disorder - Primary    Relevant Medications    ALPRAZolam (Xanax) 0.5 MG tablet    escitalopram (LEXAPRO) 10 MG tablet    Cariprazine HCl (Vraylar) 1.5 MG capsule capsule    Other Relevant Orders    Ambulatory Referral to Behavioral Health     Other Visit Diagnoses       Panic attack        Relevant Orders    Ambulatory Referral to Behavioral Health    Dizziness        Relevant Orders    Ambulatory Referral to Physical Therapy for Evaluation & Treatment (Completed)    Fear of falling        Relevant Orders    Ambulatory Referral to Physical Therapy for Evaluation & Treatment (Completed)    Impaired gait        Relevant Orders    Ambulatory Referral to Physical Therapy for Evaluation & Treatment (Completed)    Mood disorder        Relevant Medications    Cariprazine HCl (Vraylar) 1.5 MG capsule capsule    Other Relevant Orders    Ambulatory Referral to Behavioral Health          -- stop buspar, trial vraylar, continue lexapro and xanax, will get him in with behavior health for med management  -- repeat brain mri  -- vestibular and gait PT    Discussed plan of care in detail with pt today; pt verb understanding and agrees.    Follow UP  2-3 weeks     Electronically signed by LOUISE Grissom   09/04/2024 13:32 EDT      Please note that portions of this note were completed with a voice recognition program.

## 2024-09-16 ENCOUNTER — TELEPHONE (OUTPATIENT)
Dept: FAMILY MEDICINE CLINIC | Facility: CLINIC | Age: 79
End: 2024-09-16
Payer: MEDICARE

## 2024-09-25 ENCOUNTER — PRIOR AUTHORIZATION (OUTPATIENT)
Dept: FAMILY MEDICINE CLINIC | Facility: CLINIC | Age: 79
End: 2024-09-25
Payer: MEDICARE

## 2024-09-25 ENCOUNTER — OFFICE VISIT (OUTPATIENT)
Dept: FAMILY MEDICINE CLINIC | Facility: CLINIC | Age: 79
End: 2024-09-25
Payer: MEDICARE

## 2024-09-25 VITALS
DIASTOLIC BLOOD PRESSURE: 70 MMHG | SYSTOLIC BLOOD PRESSURE: 130 MMHG | OXYGEN SATURATION: 98 % | WEIGHT: 232 LBS | HEIGHT: 71 IN | HEART RATE: 60 BPM | BODY MASS INDEX: 32.48 KG/M2

## 2024-09-25 DIAGNOSIS — F39 MOOD DISORDER: ICD-10-CM

## 2024-09-25 DIAGNOSIS — F33.0 MILD EPISODE OF RECURRENT MAJOR DEPRESSIVE DISORDER: Primary | ICD-10-CM

## 2024-09-25 DIAGNOSIS — F41.1 GENERALIZED ANXIETY DISORDER: ICD-10-CM

## 2024-09-25 DIAGNOSIS — F33.0 MILD EPISODE OF RECURRENT MAJOR DEPRESSIVE DISORDER: ICD-10-CM

## 2024-09-25 DIAGNOSIS — B37.2 SKIN CANDIDIASIS: ICD-10-CM

## 2024-09-25 DIAGNOSIS — F41.0 PANIC ATTACK: ICD-10-CM

## 2024-09-25 DIAGNOSIS — F39 MOOD DISORDER: Primary | ICD-10-CM

## 2024-09-25 PROCEDURE — 1159F MED LIST DOCD IN RCRD: CPT | Performed by: NURSE PRACTITIONER

## 2024-09-25 PROCEDURE — 99213 OFFICE O/P EST LOW 20 MIN: CPT | Performed by: NURSE PRACTITIONER

## 2024-09-25 PROCEDURE — 3078F DIAST BP <80 MM HG: CPT | Performed by: NURSE PRACTITIONER

## 2024-09-25 PROCEDURE — 1160F RVW MEDS BY RX/DR IN RCRD: CPT | Performed by: NURSE PRACTITIONER

## 2024-09-25 PROCEDURE — 3075F SYST BP GE 130 - 139MM HG: CPT | Performed by: NURSE PRACTITIONER

## 2024-09-25 PROCEDURE — 1126F AMNT PAIN NOTED NONE PRSNT: CPT | Performed by: NURSE PRACTITIONER

## 2024-09-25 PROCEDURE — G2211 COMPLEX E/M VISIT ADD ON: HCPCS | Performed by: NURSE PRACTITIONER

## 2024-09-25 RX ORDER — NYSTATIN 100000 [USP'U]/G
POWDER TOPICAL 3 TIMES DAILY
Qty: 60 G | Refills: 2 | Status: SHIPPED | OUTPATIENT
Start: 2024-09-25

## 2024-10-04 ENCOUNTER — OFFICE VISIT (OUTPATIENT)
Dept: FAMILY MEDICINE CLINIC | Facility: CLINIC | Age: 79
End: 2024-10-04
Payer: MEDICARE

## 2024-10-04 VITALS
BODY MASS INDEX: 32.06 KG/M2 | WEIGHT: 229 LBS | TEMPERATURE: 98.1 F | SYSTOLIC BLOOD PRESSURE: 138 MMHG | DIASTOLIC BLOOD PRESSURE: 80 MMHG | OXYGEN SATURATION: 98 % | HEIGHT: 71 IN | HEART RATE: 76 BPM

## 2024-10-04 DIAGNOSIS — R42 DIZZINESS: Primary | ICD-10-CM

## 2024-10-04 DIAGNOSIS — I10 PRIMARY HYPERTENSION: ICD-10-CM

## 2024-10-04 PROCEDURE — G2211 COMPLEX E/M VISIT ADD ON: HCPCS | Performed by: NURSE PRACTITIONER

## 2024-10-04 PROCEDURE — 3079F DIAST BP 80-89 MM HG: CPT | Performed by: NURSE PRACTITIONER

## 2024-10-04 PROCEDURE — 1125F AMNT PAIN NOTED PAIN PRSNT: CPT | Performed by: NURSE PRACTITIONER

## 2024-10-04 PROCEDURE — 1159F MED LIST DOCD IN RCRD: CPT | Performed by: NURSE PRACTITIONER

## 2024-10-04 PROCEDURE — 1160F RVW MEDS BY RX/DR IN RCRD: CPT | Performed by: NURSE PRACTITIONER

## 2024-10-04 PROCEDURE — 3075F SYST BP GE 130 - 139MM HG: CPT | Performed by: NURSE PRACTITIONER

## 2024-10-04 PROCEDURE — 99213 OFFICE O/P EST LOW 20 MIN: CPT | Performed by: NURSE PRACTITIONER

## 2024-10-04 NOTE — PROGRESS NOTES
Subjective     Chief Complaint:    Chief Complaint   Patient presents with    Hypertension     PT states too high        History of Present Illness:   Here with his daughter.  Notes while he was in physical therapy they have been checking his blood pressure quite frequently and has had some intermittent fluctuation.  Did have orthostatic readings.  Physical therapist was concerned and wanted him to have a check-in before proceeding with physical therapy.  Patient reports continued dizziness it has been going on for several years.  Follows with cardiology and EP.    Review of Systems  Gen- No fevers, chills  CV- No chest pain, palpitations  Resp- No cough, dyspnea  GI- No N/V/D, abd pain        I have reviewed and/or updated the patient's past medical, surgical, family, social history and problem list as appropriate.     Medications:    Current Outpatient Medications:     ALPRAZolam (Xanax) 0.5 MG tablet, Take 1 tablet by mouth At Night As Needed for Sleep or Anxiety., Disp: 30 tablet, Rfl: 2    apixaban (ELIQUIS) 5 MG tablet tablet, Take 1 tablet by mouth 2 (Two) Times a Day., Disp: 60 tablet, Rfl: 1    aspirin 81 MG tablet, Take 1 tablet by mouth Daily., Disp: 30 tablet, Rfl: 0    Cariprazine HCl (Vraylar) 1.5 MG capsule capsule, Take 1 capsule by mouth Daily., Disp: 30 capsule, Rfl: 1    coenzyme Q10 100 MG capsule, Take 1 capsule by mouth Daily., Disp: , Rfl:     escitalopram (LEXAPRO) 10 MG tablet, Take 1 tablet by mouth Daily., Disp: 90 tablet, Rfl: 1    hydroCHLOROthiazide (MICROZIDE) 12.5 MG capsule, , Disp: , Rfl:     lisinopril (PRINIVIL,ZESTRIL) 10 MG tablet, Take 1 tablet by mouth 2 (Two) Times a Day., Disp: 180 tablet, Rfl: 3    nitroglycerin (NITROSTAT) 0.4 MG SL tablet, place 1 tablet under the tongue if needed every 5 minutes fo, Disp: 25 tablet, Rfl: 5    nystatin (MYCOSTATIN) 410155 UNIT/GM powder, Apply  topically to the appropriate area as directed 3 (Three) Times a Day., Disp: 60 g, Rfl:  "2    omeprazole (priLOSEC) 20 MG capsule, Take 1 capsule by mouth Daily. MUST HAVE APPOINTMENT FOR FURTHER REFILLS., Disp: 90 capsule, Rfl: 1    rosuvastatin (CRESTOR) 5 MG tablet, Take 1 tablet by mouth Daily., Disp: 90 tablet, Rfl: 3    Allergies:  No Known Allergies    Objective     Vital Signs:   Vitals:    10/04/24 1057   BP: 138/80   BP Location: Left arm   Patient Position: Sitting   Cuff Size: Adult   Pulse: 76   Temp: 98.1 °F (36.7 °C)   TempSrc: Esophageal   SpO2: 98%   Weight: 104 kg (229 lb)   Height: 180.3 cm (70.98\")   PainSc:   5   PainLoc: Back     Body mass index is 31.95 kg/m².    Vitals:    10/04/24 1057 10/04/24 1158 10/04/24 1159 10/04/24 1200   Orthostatic BP:  154/90 150/90 158/92   Orthostatic Pulse:  50 68 55   Patient Position: Sitting            Physical Exam:    Physical Exam  Vitals and nursing note reviewed.   Constitutional:       Appearance: He is well-developed.   HENT:      Head: Normocephalic and atraumatic.   Eyes:      Pupils: Pupils are equal, round, and reactive to light.   Cardiovascular:      Rate and Rhythm: Normal rate and regular rhythm.      Heart sounds: Normal heart sounds.   Pulmonary:      Effort: Pulmonary effort is normal.      Breath sounds: Normal breath sounds.   Abdominal:      General: Bowel sounds are normal. There is no distension.      Palpations: Abdomen is soft.      Tenderness: There is no abdominal tenderness.   Musculoskeletal:      Cervical back: Neck supple.   Skin:     General: Skin is warm and dry.   Neurological:      General: No focal deficit present.      Mental Status: He is alert and oriented to person, place, and time.      Comments: No slowing, no bradykinesia, good affect on face, no tremor, he is able to get out of chair without using his arms   Psychiatric:         Mood and Affect: Mood normal.         Behavior: Behavior normal.         Assessment / Plan     Assessment/Plan:   Problem List Items Addressed This Visit       Primary " hypertension    Relevant Medications    lisinopril (PRINIVIL,ZESTRIL) 10 MG tablet    hydroCHLOROthiazide (MICROZIDE) 12.5 MG capsule     Other Visit Diagnoses       Dizziness    -  Primary          I did call and discuss his case with his physical therapist.  He is not orthostatic in office.  His blood pressure has consistently demonstrated stability, his dizziness is chronic, no signs or symptoms of Parkinson's on exam today, does have somatic anxiety, I recommend that he proceed with physical therapy with monitoring symptoms versus numbers on the blood pressure cuff    Discussed plan of care in detail with pt today; pt verb understanding and agrees.    Follow up:  As scheduled    Electronically signed by LOUISE Grissom   10/04/2024 11:24 EDT      Please note that portions of this note were completed with a voice recognition program.

## 2024-10-07 ENCOUNTER — HOSPITAL ENCOUNTER (OUTPATIENT)
Dept: MRI IMAGING | Facility: HOSPITAL | Age: 79
Discharge: HOME | End: 2024-10-07
Admitting: NURSE PRACTITIONER
Payer: MEDICARE

## 2024-10-07 DIAGNOSIS — H74.92 MASTOID DISORDER, LEFT: Primary | ICD-10-CM

## 2024-10-07 DIAGNOSIS — R42 DIZZINESS: ICD-10-CM

## 2024-10-07 PROCEDURE — 70551 MRI BRAIN STEM W/O DYE: CPT

## 2024-10-07 NOTE — PROGRESS NOTES
Brain MRI shows --> He does have chronic small vessel disease which is a common finding in people of his age.  He is already on proper treatment with that due to his current condition.  There is no signs of an old stroke.  He has fluid behind the left mastoid and some sinus thickening.  This was present on the prior MRI as well.  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  I will order referral back to [facility] in [facility] for check-in

## 2024-10-17 RX ORDER — APIXABAN 5 MG/1
5 TABLET, FILM COATED ORAL 2 TIMES DAILY
Qty: 60 TABLET | Refills: 11 | Status: SHIPPED | OUTPATIENT
Start: 2024-10-17

## 2024-10-23 DIAGNOSIS — G47.00 INSOMNIA, UNSPECIFIED TYPE: ICD-10-CM

## 2024-10-23 DIAGNOSIS — F41.1 GENERALIZED ANXIETY DISORDER: ICD-10-CM

## 2024-10-23 RX ORDER — ALPRAZOLAM 0.5 MG
0.5 TABLET ORAL NIGHTLY PRN
Qty: 30 TABLET | Refills: 2 | OUTPATIENT
Start: 2024-10-23

## 2024-10-23 RX ORDER — ALPRAZOLAM 0.5 MG
0.5 TABLET ORAL NIGHTLY PRN
Qty: 30 TABLET | Refills: 2 | Status: SHIPPED | OUTPATIENT
Start: 2024-10-23

## 2024-10-23 NOTE — TELEPHONE ENCOUNTER
Rx Refill Note  Requested Prescriptions     Pending Prescriptions Disp Refills    ALPRAZolam (XANAX) 0.5 MG tablet [Pharmacy Med Name: ALPRAZOLAM 0.5 MG TABLET 0.5 Tablet] 30 tablet 2     Sig: TAKE 1 TABLET BY MOUTH AT NIGHT AS NEEDED FOR SLEEP OR ANXIETY.      Last office visit with prescribing clinician: 10/4/2024   Last telemedicine visit with prescribing clinician: Visit date not found   Next office visit with prescribing clinician: 10/23/2024                         Would you like a call back once the refill request has been completed: [] Yes [] No    If the office needs to give you a call back, can they leave a voicemail: [] Yes [] No    Nelda Adams CMA  10/23/24, 13:10 EDT

## 2024-10-24 ENCOUNTER — OFFICE VISIT (OUTPATIENT)
Dept: BEHAVIORAL HEALTH | Facility: CLINIC | Age: 79
End: 2024-10-24
Payer: MEDICARE

## 2024-10-24 VITALS
OXYGEN SATURATION: 96 % | BODY MASS INDEX: 31.92 KG/M2 | HEIGHT: 71 IN | HEART RATE: 48 BPM | SYSTOLIC BLOOD PRESSURE: 132 MMHG | WEIGHT: 228 LBS | DIASTOLIC BLOOD PRESSURE: 80 MMHG

## 2024-10-24 DIAGNOSIS — F33.1 MAJOR DEPRESSIVE DISORDER, RECURRENT EPISODE, MODERATE: ICD-10-CM

## 2024-10-24 DIAGNOSIS — F41.1 GENERALIZED ANXIETY DISORDER: Primary | ICD-10-CM

## 2024-10-24 DIAGNOSIS — F39 MOOD DISORDER: ICD-10-CM

## 2024-10-24 DIAGNOSIS — F33.0 MILD EPISODE OF RECURRENT MAJOR DEPRESSIVE DISORDER: ICD-10-CM

## 2024-10-24 PROCEDURE — 90792 PSYCH DIAG EVAL W/MED SRVCS: CPT

## 2024-10-24 PROCEDURE — 3079F DIAST BP 80-89 MM HG: CPT

## 2024-10-24 PROCEDURE — 1160F RVW MEDS BY RX/DR IN RCRD: CPT

## 2024-10-24 PROCEDURE — 1159F MED LIST DOCD IN RCRD: CPT

## 2024-10-24 PROCEDURE — 3075F SYST BP GE 130 - 139MM HG: CPT

## 2024-10-24 NOTE — PROGRESS NOTES
New Patient Office Visit    Patient Name: Tristan Hillman  : 1945   MRN: [id number]   Care Team: Patient Care Team:  Kerri Chew APRN as PCP - General (Nurse Practitioner)  Katheryn Craven APRN as Nurse Practitioner (Behavioral Health)         Chief Complaint:    Chief Complaint   Patient presents with    Anxiety    Med Management    Depression       History of Present Illness: Tristan Hillman is a 79 y.o. male who is here today to establish care.  Patient was referred by PCP.  Patient presents with his daughter in room.  Patient has two hearing aids in bilaterally and states he is hard of hearing.  Patient states that he has not had issues with anxiety and depression in the past and has been very active in work, [employer] and home activities.  Patient and daughter state that symptoms of sadness, restlessness, SI and irritability started when patient retired and has progressively gotten worse as patient's mobility has started to decrease due to symptoms of getting dizzy when he stands up.  Both state that the death of his dog, his mother and Iding his nephew after a car accident has contributed to his symptoms. Both patient and daughter agree that the Lexapro, Xanax and Vraylar has been effective in decreasing thoughts of suicide, helping him feel motivation to get up and do activities during the day and helps him sleep well at night.  Both daughter and the patient state that there are issues with insurance not paying for Vraylar and patient's wife not being supportive of patient taking medication.  Patient states he hides the Vraylar and takes it when he is not around his wife.  Wife is ok with patient taking Lexapro and Xanax.  Both patient and daughter state that these two issues are causing patient to seek other treatment besides the Vraylar at this time.  Patient denies SI/HI at this time.    The following portion of the patient's history were reviewed and updated appropriately: allergies, current  and past medications, family history, medical history and social history. PHILIP reviewed and appropriate.   Subjective   Review of Systems:    Review of Systems   Respiratory: Negative.     Cardiovascular: Negative.  Negative for chest pain and palpitations.   Neurological: Negative.    Psychiatric/Behavioral:  Positive for depressed mood and stress. The patient is nervous/anxious.    All other systems reviewed and are negative.      PSYCHIATRIC HISTORY   Current Psychiatric Medications:  Xanax, Vraylar, Lexapro  Prior Psychiatric Medications:  none  Currently in Counseling or Therapy:  none  Prior Psychiatric Outpatient Care:  none  Prior Psychiatric Hospitalizations:  none  Previous Suicide Attempts:  none  Previous Self-Harming Behavior:  none  History of Seizures or TBI:  none  Abuse History:  Verbal: none  Emotional: none  Mental: none  Physical: none  Sexual: none  Rape: none        Family Psychiatric History:  Mother had anxiety issues, Sister has issues with anxiety and depression  Any family history of suicide attempts:  Sister X2      Substance Abuse History:  Alcohol: none  Smoking/Cigarettes: none  Vaping: none  Smokeless Tobacco: Chews a cigar daily  Illicit Substances: none  Prescription Medication Misuse: none    Social History:  Born: [facility]  Raised: [place]  Currently resides in [facility]   Marriage status:   Children: 2 adult children  Lives with: The patient's currently household consists of the patient, wife  Highest Level of Education: High school graduate   Employment: Retired from [employer]   History: none  Legal History, Arrests, or Incarcerations: No current legal charges pending.  Patient's Support Network Includes:  wife, daughter      Current Medications:   Current Outpatient Medications   Medication Sig Dispense Refill    Cariprazine HCl (Vraylar) 1.5 MG capsule capsule Take 1 capsule by mouth Daily.      ALPRAZolam (XANAX) 0.5 MG tablet TAKE 1 TABLET BY MOUTH  "AT NIGHT AS NEEDED FOR SLEEP OR ANXIETY. 30 tablet 2    apixaban (Eliquis) 5 MG tablet tablet TAKE 1 TABLET BY MOUTH 2 (TWO) TIMES A DAY. 60 tablet 11    aspirin 81 MG tablet Take 1 tablet by mouth Daily. 30 tablet 0    coenzyme Q10 100 MG capsule Take 1 capsule by mouth Daily.      escitalopram (LEXAPRO) 10 MG tablet Take 1 tablet by mouth Daily. 90 tablet 1    hydroCHLOROthiazide (MICROZIDE) 12.5 MG capsule       lisinopril (PRINIVIL,ZESTRIL) 10 MG tablet Take 1 tablet by mouth 2 (Two) Times a Day. 180 tablet 3    nitroglycerin (NITROSTAT) 0.4 MG SL tablet place 1 tablet under the tongue if needed every 5 minutes fo 25 tablet 5    nystatin (MYCOSTATIN) 718246 UNIT/GM powder Apply  topically to the appropriate area as directed 3 (Three) Times a Day. 60 g 2    omeprazole (priLOSEC) 20 MG capsule Take 1 capsule by mouth Daily. MUST HAVE APPOINTMENT FOR FURTHER REFILLS. 90 capsule 1    rosuvastatin (CRESTOR) 5 MG tablet Take 1 tablet by mouth Daily. 90 tablet 3     No current facility-administered medications for this visit.       Mental Status Exam:   Hygiene:   good  Cooperation:  Cooperative  Eye Contact:  Good  Psychomotor Behavior:  Appropriate  Affect:  Full range  Mood: normal  Speech:  Normal  Thought Process:  Goal directed  Thought Content:  Normal  Suicidal:  Suicidal Ideation  Homicidal:  None  Hallucinations:  None  Delusion:  None  Memory:   Patient states that he has had deficits but able to recall history  Orientation:  Person, Place, Time, and Situation  Reliability:  fair  Insight:  Fair  Judgement:  Fair  Impulse Control:  Fair  Physical/Medical Issues:  Yes See medical chart       Objective   Vital Signs:   /80   Pulse (!) 48   Ht 180.3 cm (71\")   Wt 103 kg (228 lb)   SpO2 96%   BMI 31.80 kg/m²       Assessment / Plan    Diagnoses and all orders for this visit:    1. Generalized anxiety disorder (Primary)  -     Cariprazine HCl (Vraylar) 1.5 MG capsule capsule; Take 1 capsule by mouth " Daily.    2. Major depressive disorder, recurrent episode, moderate    3. Mood disorder  -     Cariprazine HCl (Vraylar) 1.5 MG capsule capsule; Take 1 capsule by mouth Daily.    4. Mild episode of recurrent major depressive disorder  -     Cariprazine HCl (Vraylar) 1.5 MG capsule capsule; Take 1 capsule by mouth Daily.       Overall, Patient appears to be doing well on current medication. No issues reported and no indication for change. Will continue medication as ordered. Encouraged patient ask his wife to come to our next appointment so we can discuss any concerns she has regarding medication.     AIMS  Facial and Oral Movements  Muscles of Facial Expression: None, normal  Lips and Perioral Area: None, normal  Jaw: None, normal  Tongue: None, normal  Extremity Movements  Upper (arms, wrists, hands, fingers): None, normal  Lower (legs, knees, ankles, toes): None, normal  Trunk Movements  Neck, shoulders, hips: None, normal  Overall Severity  Severity of abnormal movements (max 4): 0  Incapacitation due to abnormal movements: None, normal  Patient's awareness of abnormal movements (rate only patient's report): Aware, no distress  Dental Status  Current problems with teeth and/or dentures?: No  Does patient usually wear dentures?: No      PHQ-9   PHQ-2/PHQ-9: Depression Screening  Little interest or pleasure in doing things: More than half the days  Feeling down, depressed, or hopeless: More than half the days  Patient Health Questionnaire-2 Score: 4  Trouble falling or staying asleep, or sleeping too much: Nearly every day  Feeling tired or having little energy: Nearly every day  Poor appetite or overeating: More than half the days  Feeling bad about yourself - or that you are a failure or have let yourself or your family down: Nearly every day  Trouble concentrating on things, such as reading the newspaper or watching television: Nearly every day  Moving or speaking so slowly that other people could have noticed?  Or the opposite - being so fidgety or restless that you have been moving around a lot more than usual.: Nearly every day  Thoughts that you would be better off dead or hurting yourself in some way: Several days  Patient Health Questionnaire-9 Score: 22      PHQ-9 Score:   PHQ-9 Total Score: 22    Depression Screening:  Patient screened positive for depression based on a PHQ-9 score of 22 on 10/24/2024. Follow-up recommendations include: Prescribed antidepressant medication treatment and Suicide Risk Assessment performed.      BASSEM-7 Score:  Feeling nervous, anxious or on edge: More than half the days  Not being able to stop or control worrying: Nearly every day  Worrying too much about different things: Nearly every day  Trouble Relaxing: Several days  Being so restless that it is hard to sit still: Not at all  Feeling afraid as if something awful might happen: Nearly every day  Becoming easily annoyed or irritable: Nearly every day  BASSEM 7 Total Score: 15     ADHD  Screening for Adults With ADHD - (1-6)  1. How often do you have trouble wrapping up the final details of a project, once the challenging parts have been done?: Very Often  2. How often do you have difficulty getting things in order when you have to do a task that requires organization?: Very Often  3. How often do you have problems remembering appointments or obligations : Very Often  4. When you have a task that requires a lot of thought, how often do you avoid or delay getting started ?: Very Often  5. How often do you fidget or squirm with your hands or feet when you have to sit down for a long time?: Very Often  6. How often do you feel overly active and compelled to do things, like you were driven by a motor?: Rarely  7. How often do you make careless mistakes when you have to work on a boring or difficult project?: Very Often  8. How often do have difficulty keeping your attention when you are doing boring or repetitive work?: Very Often  9. How  often do you have difficulty concentrating on what people say to you, even when they are speaking to you: Very Often  10.How often do you misplace or have difficulty finding things at home or at work?: Very Often  11.How often are you distracted by activity or noise around you?: Often  12.How often do you leave your seat in meetings or other situations in which you are expected to remain seated?: Rarely  13.How often do you feel restless or fidgety?: Very Often  14.How often do you have difficulty unwinding and relaxing when you have time to yourself?: Rarely  15.How often do you find yourself talking too much when you are in social situations?: Rarely  16.When you’re in a conversation, how often do you find yourself finishing the sentences of the people you are talking to, before they can finish them themselves?: Never  17.How often do you have difficulty waiting your turn in situations when turn taking is required?: Never  18.How often do you interrupt others when they are busy?: Never    A psychological evaluation was conducted in order to assess past and current level of functioning. Areas assessed included, but were not limited to: perception of social support, perception of ability to face and deal with challenges in life (positive functioning), anxiety symptoms, depressive symptoms, perspective on beliefs/belief system, coping skills for stress, intelligence level,  Therapeutic rapport was established. Interventions conducted today were geared towards incorporating medication management along with support for continued therapy. Education was also provided as to the med management with this provider and what to expect in subsequent sessions.      We discussed risks, benefits, goals and side effects of the above medication and the patient was agreeable with the plan. Patient was educated on the importance of compliance with treatment and follow-up appointments. Patient is aware to contact the [facility]  with any worsening of symptoms. To call for questions or concerns and return early if necessary. Patent is agreeable to go to the Emergency Department or call 911 should they begin SI/HI.    MEDS ORDERED DURING VISIT:  New Medications Ordered This Visit   Medications    Cariprazine HCl (Vraylar) 1.5 MG capsule capsule     Sig: Take 1 capsule by mouth Daily.         Follow Up   Return in about 6 weeks (around 12/5/2024).  Patient was given instructions and counseling regarding his condition or for health maintenance advice. Please see specific information pulled into the AVS if appropriate.     TREATMENT PLAN/GOALS: Continue supportive psychotherapy efforts and medications as indicated. Treatment and medication options discussed during today's visit. Patient acknowledged and verbally consented to continue with current treatment plan and was educated on the importance of compliance with treatment and follow-up appointments.    MEDICATION ISSUES:  Discussed medication options and treatment plan of prescribed medication as well as the risks, benefits, and side effects including potential falls, possible impaired driving and metabolic adversities among others. Patient is agreeable to call the office with any worsening of symptoms or onset of side effects. Patient is agreeable to call 911 or go to the nearest ER should he/she begin having SI/HI.        [facility]  [address] [place] [address] DR MICHAEL ENCARNACION [id number] [phone]     October 24, 2024 12:51 EDT

## 2024-11-15 RX ORDER — HYDROCHLOROTHIAZIDE 12.5 MG/1
12.5 CAPSULE ORAL EVERY MORNING
Qty: 90 CAPSULE | Refills: 1 | Status: SHIPPED | OUTPATIENT
Start: 2024-11-15 | End: 2024-11-15 | Stop reason: SDUPTHER

## 2024-11-15 RX ORDER — HYDROCHLOROTHIAZIDE 12.5 MG/1
12.5 CAPSULE ORAL EVERY MORNING
Qty: 90 CAPSULE | Refills: 1 | Status: SHIPPED | OUTPATIENT
Start: 2024-11-15

## 2024-11-15 NOTE — TELEPHONE ENCOUNTER
MATEUSZ WITH [facility] CALLED AND ASKED IF WE CAN CHANGE THIS SCRIPT TO TABLETS. GALINA SPOKE WITH HIM AND GAVE HIM A VERBAL OK.

## 2024-11-15 NOTE — TELEPHONE ENCOUNTER
Caller: Alanya Onofre    Relationship: Emergency Contact    Best call back number: [phone]     Requested Prescriptions:   Requested Prescriptions     Pending Prescriptions Disp Refills    hydroCHLOROthiazide (MICROZIDE) 12.5 MG capsule          Pharmacy where request should be sent: [address] [phone] [facility] [phone]      Last office visit with prescribing clinician: 10/4/2024   Last telemedicine visit with prescribing clinician: Visit date not found   Next office visit with prescribing clinician: 11/25/2024     Additional details provided by patient: PATIENT HAS 5 DAYS REMAINING OF THIS MEDICATION      Does the patient have less than a 3 day supply:  [] Yes  [x] No    Would you like a call back once the refill request has been completed: [] Yes [x] No    If the office needs to give you a call back, can they leave a voicemail: [] Yes [x] No    Renny Oscar Rep   11/15/24 09:04 EST

## 2024-11-15 NOTE — TELEPHONE ENCOUNTER
Caller: [address] - [phone] [facility] [phone] FX    Relationship: Pharmacy        Requested Prescriptions:   Requested Prescriptions     Pending Prescriptions Disp Refills    hydroCHLOROthiazide (MICROZIDE) 12.5 MG capsule 90 capsule 1     Sig: Take 1 capsule by mouth Every Morning.        Pharmacy where request should be sent:      Last office visit with prescribing clinician: 10/4/2024   Last telemedicine visit with prescribing clinician: Visit date not found   Next office visit with prescribing clinician: 11/25/2024     Additional details provided by patient: STATED THAT PHARMACY DOES NOT HAVE THE CAPSULES IN STOCK, REQUEST THE TABLETS   Renny Rosa Rep   11/15/24 12:57 EST

## 2024-11-15 NOTE — TELEPHONE ENCOUNTER
Rx Refill Note  Requested Prescriptions     Pending Prescriptions Disp Refills    hydroCHLOROthiazide (MICROZIDE) 12.5 MG capsule        Last office visit with prescribing clinician: 10/4/2024   Last telemedicine visit with prescribing clinician: Visit date not found   Next office visit with prescribing clinician: 11/25/2024                         Would you like a call back once the refill request has been completed: [] Yes [] No    If the office needs to give you a call back, can they leave a voicemail: [] Yes [] No    Mandy Valle MA  11/15/24, 09:08 EST

## 2024-11-25 ENCOUNTER — OFFICE VISIT (OUTPATIENT)
Dept: FAMILY MEDICINE CLINIC | Facility: CLINIC | Age: 79
End: 2024-11-25
Payer: MEDICARE

## 2024-11-25 VITALS
DIASTOLIC BLOOD PRESSURE: 90 MMHG | RESPIRATION RATE: 14 BRPM | HEART RATE: 62 BPM | HEIGHT: 71 IN | OXYGEN SATURATION: 98 % | SYSTOLIC BLOOD PRESSURE: 138 MMHG | WEIGHT: 227 LBS | TEMPERATURE: 97.9 F | BODY MASS INDEX: 31.78 KG/M2

## 2024-11-25 DIAGNOSIS — R42 DIZZINESS: Primary | ICD-10-CM

## 2024-11-25 DIAGNOSIS — F33.0 MILD EPISODE OF RECURRENT MAJOR DEPRESSIVE DISORDER: ICD-10-CM

## 2024-11-25 DIAGNOSIS — Z23 NEED FOR INFLUENZA VACCINATION: ICD-10-CM

## 2024-11-25 DIAGNOSIS — G47.00 INSOMNIA, UNSPECIFIED TYPE: ICD-10-CM

## 2024-11-25 DIAGNOSIS — I10 PRIMARY HYPERTENSION: ICD-10-CM

## 2024-11-25 DIAGNOSIS — F41.1 GENERALIZED ANXIETY DISORDER: ICD-10-CM

## 2024-11-25 DIAGNOSIS — F39 MOOD DISORDER: ICD-10-CM

## 2024-11-25 DIAGNOSIS — R79.9 ABNORMAL FINDING OF BLOOD CHEMISTRY, UNSPECIFIED: ICD-10-CM

## 2024-11-25 PROCEDURE — 90662 IIV NO PRSV INCREASED AG IM: CPT | Performed by: NURSE PRACTITIONER

## 2024-11-25 PROCEDURE — 3075F SYST BP GE 130 - 139MM HG: CPT | Performed by: NURSE PRACTITIONER

## 2024-11-25 PROCEDURE — 3080F DIAST BP >= 90 MM HG: CPT | Performed by: NURSE PRACTITIONER

## 2024-11-25 PROCEDURE — G0008 ADMIN INFLUENZA VIRUS VAC: HCPCS | Performed by: NURSE PRACTITIONER

## 2024-11-25 PROCEDURE — 99214 OFFICE O/P EST MOD 30 MIN: CPT | Performed by: NURSE PRACTITIONER

## 2024-11-25 PROCEDURE — 1125F AMNT PAIN NOTED PAIN PRSNT: CPT | Performed by: NURSE PRACTITIONER

## 2024-11-25 NOTE — PROGRESS NOTES
Subjective     Chief Complaint:    Chief Complaint   Patient presents with    Dizziness     Reports little resolution in symptoms since last visit.        History of Present Illness:   Notes continued PT and is still feeling dizzy. He did not go last week and he is not sure if he wants to go back  MRI brain reviewed  Has seen ENT and they referred him to , not sure if he wants to do that or not  Still with mood dysregulation at times, does see isabel   Spends a lot of time in the chair and does not move around much       Review of Systems  Gen- No fevers, chills  CV- No chest pain, palpitations  Resp- No cough, dyspnea  GI- No N/V/D, abd pain  Neuro-No dizziness, headaches      I have reviewed and/or updated the patient's past medical, surgical, family, social history and problem list as appropriate.     Medications:    Current Outpatient Medications:     ALPRAZolam (XANAX) 0.5 MG tablet, TAKE 1 TABLET BY MOUTH AT NIGHT AS NEEDED FOR SLEEP OR ANXIETY., Disp: 30 tablet, Rfl: 2    apixaban (Eliquis) 5 MG tablet tablet, TAKE 1 TABLET BY MOUTH 2 (TWO) TIMES A DAY., Disp: 60 tablet, Rfl: 11    aspirin 81 MG tablet, Take 1 tablet by mouth Daily., Disp: 30 tablet, Rfl: 0    Cariprazine HCl (Vraylar) 1.5 MG capsule capsule, Take 1 capsule by mouth Daily., Disp: , Rfl:     coenzyme Q10 100 MG capsule, Take 1 capsule by mouth Daily., Disp: , Rfl:     escitalopram (LEXAPRO) 10 MG tablet, Take 1 tablet by mouth Daily., Disp: 90 tablet, Rfl: 1    hydroCHLOROthiazide (MICROZIDE) 12.5 MG capsule, Take 1 capsule by mouth Every Morning., Disp: 90 capsule, Rfl: 1    lisinopril (PRINIVIL,ZESTRIL) 10 MG tablet, Take 1 tablet by mouth 2 (Two) Times a Day., Disp: 180 tablet, Rfl: 3    nitroglycerin (NITROSTAT) 0.4 MG SL tablet, place 1 tablet under the tongue if needed every 5 minutes fo, Disp: 25 tablet, Rfl: 5    nystatin (MYCOSTATIN) 788317 UNIT/GM powder, Apply  topically to the appropriate area as directed 3 (Three) Times a  "Day., Disp: 60 g, Rfl: 2    omeprazole (priLOSEC) 20 MG capsule, Take 1 capsule by mouth Daily. MUST HAVE APPOINTMENT FOR FURTHER REFILLS., Disp: 90 capsule, Rfl: 1    rosuvastatin (CRESTOR) 5 MG tablet, Take 1 tablet by mouth Daily., Disp: 90 tablet, Rfl: 3    Allergies:  No Known Allergies    Objective     Vital Signs:   Vitals:    11/25/24 1329   BP: 138/90   BP Location: Left arm   Patient Position: Sitting   Cuff Size: Adult   Pulse: 62   Resp: 14   Temp: 97.9 °F (36.6 °C)   TempSrc: Oral   SpO2: 98%   Weight: 103 kg (227 lb)   Height: 180.3 cm (70.98\")   PainSc:   4   PainLoc: Back     Body mass index is 31.67 kg/m².    Physical Exam:    Physical Exam  Vitals and nursing note reviewed.   Constitutional:       Appearance: He is well-developed.   HENT:      Head: Normocephalic and atraumatic.   Eyes:      Pupils: Pupils are equal, round, and reactive to light.   Cardiovascular:      Rate and Rhythm: Normal rate and regular rhythm.      Heart sounds: Normal heart sounds.   Pulmonary:      Effort: Pulmonary effort is normal.      Breath sounds: Normal breath sounds.   Abdominal:      General: Bowel sounds are normal. There is no distension.      Palpations: Abdomen is soft.      Tenderness: There is no abdominal tenderness.   Musculoskeletal:      Cervical back: Neck supple.   Skin:     General: Skin is warm and dry.   Neurological:      General: No focal deficit present.      Mental Status: He is alert and oriented to person, place, and time.   Psychiatric:         Mood and Affect: Mood normal.         Behavior: Behavior normal.         Assessment / Plan     Assessment/Plan:   Problem List Items Addressed This Visit       Generalized anxiety disorder    Relevant Medications    escitalopram (LEXAPRO) 10 MG tablet    ALPRAZolam (XANAX) 0.5 MG tablet    Cariprazine HCl (Vraylar) 1.5 MG capsule capsule    Primary hypertension    Relevant Medications    lisinopril (PRINIVIL,ZESTRIL) 10 MG tablet    hydroCHLOROthiazide " (MICROZIDE) 12.5 MG capsule    Other Relevant Orders    Comprehensive Metabolic Panel    CBC Auto Differential    Insomnia    Relevant Medications    ALPRAZolam (XANAX) 0.5 MG tablet     Other Visit Diagnoses       Dizziness    -  Primary    Relevant Orders    Ambulatory Referral to ENT (Otolaryngology)    Ferritin    Iron Profile    Mood disorder        Mild episode of recurrent major depressive disorder        Abnormal finding of blood chemistry, unspecified        Relevant Orders    Ferritin    Iron Profile          -- labs  -- needs to see  [facility]  -- continue current meds  -- long discussed about activity and deconditioning       Discussed plan of care in detail with pt today; pt verb understanding and agrees.    Follow up:  3 months    Electronically signed by LOUISE Grissom   11/25/2024 13:38 EST      Please note that portions of this note were completed with a voice recognition program.

## 2024-11-26 LAB
ALBUMIN SERPL-MCNC: 4.2 G/DL (ref 3.5–5.2)
ALBUMIN/GLOB SERPL: 1.8 G/DL
ALP SERPL-CCNC: 60 U/L (ref 39–117)
ALT SERPL-CCNC: 17 U/L (ref 1–41)
AST SERPL-CCNC: 20 U/L (ref 1–40)
BASOPHILS # BLD AUTO: 0.07 10*3/MM3 (ref 0–0.2)
BASOPHILS NFR BLD AUTO: 0.9 % (ref 0–1.5)
BILIRUB SERPL-MCNC: <0.2 MG/DL (ref 0–1.2)
BUN SERPL-MCNC: 22 MG/DL (ref 8–23)
BUN/CREAT SERPL: 22.2 (ref 7–25)
CALCIUM SERPL-MCNC: 9.3 MG/DL (ref 8.6–10.5)
CHLORIDE SERPL-SCNC: 103 MMOL/L (ref 98–107)
CO2 SERPL-SCNC: 25.9 MMOL/L (ref 22–29)
CREAT SERPL-MCNC: 0.99 MG/DL (ref 0.76–1.27)
EGFRCR SERPLBLD CKD-EPI 2021: 78.5 ML/MIN/1.73
EOSINOPHIL # BLD AUTO: 0.09 10*3/MM3 (ref 0–0.4)
EOSINOPHIL NFR BLD AUTO: 1.1 % (ref 0.3–6.2)
ERYTHROCYTE [DISTWIDTH] IN BLOOD BY AUTOMATED COUNT: 12.3 % (ref 12.3–15.4)
FERRITIN SERPL-MCNC: 196 NG/ML (ref 30–400)
GLOBULIN SER CALC-MCNC: 2.3 GM/DL
GLUCOSE SERPL-MCNC: 107 MG/DL (ref 65–99)
HCT VFR BLD AUTO: 43.5 % (ref 37.5–51)
HGB BLD-MCNC: 14.3 G/DL (ref 13–17.7)
IMM GRANULOCYTES # BLD AUTO: 0.03 10*3/MM3 (ref 0–0.05)
IMM GRANULOCYTES NFR BLD AUTO: 0.4 % (ref 0–0.5)
IRON SATN MFR SERPL: 28 % (ref 20–50)
IRON SERPL-MCNC: 88 MCG/DL (ref 59–158)
LYMPHOCYTES # BLD AUTO: 1.8 10*3/MM3 (ref 0.7–3.1)
LYMPHOCYTES NFR BLD AUTO: 22.9 % (ref 19.6–45.3)
MAGNESIUM SERPL-MCNC: 1.8 MG/DL (ref 1.6–2.4)
MCH RBC QN AUTO: 31.2 PG (ref 26.6–33)
MCHC RBC AUTO-ENTMCNC: 32.9 G/DL (ref 31.5–35.7)
MCV RBC AUTO: 94.8 FL (ref 79–97)
MONOCYTES # BLD AUTO: 0.7 10*3/MM3 (ref 0.1–0.9)
MONOCYTES NFR BLD AUTO: 8.9 % (ref 5–12)
NEUTROPHILS # BLD AUTO: 5.16 10*3/MM3 (ref 1.7–7)
NEUTROPHILS NFR BLD AUTO: 65.8 % (ref 42.7–76)
NRBC BLD AUTO-RTO: 0 /100 WBC (ref 0–0.2)
PLATELET # BLD AUTO: 199 10*3/MM3 (ref 140–450)
POTASSIUM SERPL-SCNC: 4.2 MMOL/L (ref 3.5–5.2)
PROT SERPL-MCNC: 6.5 G/DL (ref 6–8.5)
RBC # BLD AUTO: 4.59 10*6/MM3 (ref 4.14–5.8)
SODIUM SERPL-SCNC: 140 MMOL/L (ref 136–145)
TIBC SERPL-MCNC: 311 MCG/DL
UIBC SERPL-MCNC: 223 MCG/DL (ref 112–346)
WBC # BLD AUTO: 7.85 10*3/MM3 (ref 3.4–10.8)

## 2024-12-16 ENCOUNTER — TELEPHONE (OUTPATIENT)
Dept: BEHAVIORAL HEALTH | Facility: CLINIC | Age: 79
End: 2024-12-16
Payer: MEDICARE

## 2024-12-16 NOTE — TELEPHONE ENCOUNTER
PATIENT IS FEELING WEAK AND IS NOT FEELING WELL ENOUGH TO COME IN FOR HIS APPT TMR[facility]. HE IS NEEDING HIS VRAYLAR.

## 2024-12-17 NOTE — TELEPHONE ENCOUNTER
Spoke with Mr. Jesus daughter. She states he's been pretty sick physically, not moving around a lot. Patients daughter said he was just needing some samples. I told her that I would give her a call when they were ready to be picked up.

## 2024-12-30 ENCOUNTER — OFFICE VISIT (OUTPATIENT)
Dept: FAMILY MEDICINE CLINIC | Facility: CLINIC | Age: 79
End: 2024-12-30
Payer: MEDICARE

## 2024-12-30 VITALS
WEIGHT: 225 LBS | SYSTOLIC BLOOD PRESSURE: 108 MMHG | OXYGEN SATURATION: 97 % | RESPIRATION RATE: 14 BRPM | HEIGHT: 71 IN | BODY MASS INDEX: 31.5 KG/M2 | TEMPERATURE: 98 F | DIASTOLIC BLOOD PRESSURE: 60 MMHG | HEART RATE: 60 BPM

## 2024-12-30 DIAGNOSIS — R42 DIZZINESS: ICD-10-CM

## 2024-12-30 DIAGNOSIS — G25.9 MOVEMENT DISORDER: ICD-10-CM

## 2024-12-30 DIAGNOSIS — F39 MOOD DISORDER: ICD-10-CM

## 2024-12-30 DIAGNOSIS — I10 PRIMARY HYPERTENSION: ICD-10-CM

## 2024-12-30 DIAGNOSIS — F33.0 MILD EPISODE OF RECURRENT MAJOR DEPRESSIVE DISORDER: ICD-10-CM

## 2024-12-30 DIAGNOSIS — Z00.00 MEDICARE ANNUAL WELLNESS VISIT, SUBSEQUENT: Primary | ICD-10-CM

## 2024-12-30 DIAGNOSIS — Z00.00 PREVENTATIVE HEALTH CARE: ICD-10-CM

## 2024-12-30 DIAGNOSIS — I48.0 PAROXYSMAL ATRIAL FIBRILLATION: ICD-10-CM

## 2024-12-30 DIAGNOSIS — R29.818 PARKINSONIAN FEATURES: ICD-10-CM

## 2024-12-30 DIAGNOSIS — E78.5 DYSLIPIDEMIA: ICD-10-CM

## 2024-12-30 DIAGNOSIS — F41.1 GENERALIZED ANXIETY DISORDER: ICD-10-CM

## 2024-12-30 DIAGNOSIS — G47.00 INSOMNIA, UNSPECIFIED TYPE: ICD-10-CM

## 2024-12-30 PROCEDURE — 3074F SYST BP LT 130 MM HG: CPT | Performed by: NURSE PRACTITIONER

## 2024-12-30 PROCEDURE — 1170F FXNL STATUS ASSESSED: CPT | Performed by: NURSE PRACTITIONER

## 2024-12-30 PROCEDURE — 96160 PT-FOCUSED HLTH RISK ASSMT: CPT | Performed by: NURSE PRACTITIONER

## 2024-12-30 PROCEDURE — 1126F AMNT PAIN NOTED NONE PRSNT: CPT | Performed by: NURSE PRACTITIONER

## 2024-12-30 PROCEDURE — 3078F DIAST BP <80 MM HG: CPT | Performed by: NURSE PRACTITIONER

## 2024-12-30 PROCEDURE — G0439 PPPS, SUBSEQ VISIT: HCPCS | Performed by: NURSE PRACTITIONER

## 2024-12-30 PROCEDURE — 99397 PER PM REEVAL EST PAT 65+ YR: CPT | Performed by: NURSE PRACTITIONER

## 2024-12-30 PROCEDURE — 1160F RVW MEDS BY RX/DR IN RCRD: CPT | Performed by: NURSE PRACTITIONER

## 2024-12-30 PROCEDURE — 1159F MED LIST DOCD IN RCRD: CPT | Performed by: NURSE PRACTITIONER

## 2024-12-30 PROCEDURE — 99214 OFFICE O/P EST MOD 30 MIN: CPT | Performed by: NURSE PRACTITIONER

## 2024-12-30 RX ORDER — CARBIDOPA AND LEVODOPA 25; 100 MG/1; MG/1
1 TABLET, EXTENDED RELEASE ORAL 2 TIMES DAILY
Qty: 60 TABLET | Refills: 1 | Status: SHIPPED | OUTPATIENT
Start: 2024-12-30 | End: 2025-12-30

## 2024-12-30 RX ORDER — LISINOPRIL AND HYDROCHLOROTHIAZIDE 12.5; 2 MG/1; MG/1
1 TABLET ORAL DAILY
Qty: 90 TABLET | Refills: 1 | Status: SHIPPED | OUTPATIENT
Start: 2024-12-30

## 2024-12-30 NOTE — PROGRESS NOTES
Subjective   The ABCs of the Annual Wellness Visit  Medicare Wellness Visit      Tristan Hillman is a 79 y.o. patient who presents for a Medicare Wellness Visit.    The following portions of the patient's history were reviewed and   updated as appropriate: allergies, current medications, past family history, past medical history, past social history, past surgical history, and problem list.    Compared to one year ago, the patient's physical   health is worse.  Compared to one year ago, the patient's mental   health is worse.    Recent Hospitalizations:  He was not admitted to the hospital during the last year.     Current Medical Providers:  Patient Care Team:  Kerri Chew APRN as PCP - General (Nurse Practitioner)  Katheryn Craven APRN as Nurse Practitioner (Behavioral Health)    Outpatient Medications Prior to Visit   Medication Sig Dispense Refill    ALPRAZolam (XANAX) 0.5 MG tablet TAKE 1 TABLET BY MOUTH AT NIGHT AS NEEDED FOR SLEEP OR ANXIETY. 30 tablet 2    apixaban (Eliquis) 5 MG tablet tablet TAKE 1 TABLET BY MOUTH 2 (TWO) TIMES A DAY. 60 tablet 11    aspirin 81 MG tablet Take 1 tablet by mouth Daily. 30 tablet 0    Cariprazine HCl (Vraylar) 1.5 MG capsule capsule Take 1 capsule by mouth Daily.      coenzyme Q10 100 MG capsule Take 1 capsule by mouth Daily.      escitalopram (LEXAPRO) 10 MG tablet Take 1 tablet by mouth Daily. 90 tablet 1    nitroglycerin (NITROSTAT) 0.4 MG SL tablet place 1 tablet under the tongue if needed every 5 minutes fo 25 tablet 5    nystatin (MYCOSTATIN) 887721 UNIT/GM powder Apply  topically to the appropriate area as directed 3 (Three) Times a Day. 60 g 2    omeprazole (priLOSEC) 20 MG capsule Take 1 capsule by mouth Daily. MUST HAVE APPOINTMENT FOR FURTHER REFILLS. 90 capsule 1    rosuvastatin (CRESTOR) 5 MG tablet Take 1 tablet by mouth Daily. 90 tablet 3    hydroCHLOROthiazide (MICROZIDE) 12.5 MG capsule Take 1 capsule by mouth Every Morning. 90 capsule 1     "lisinopril (PRINIVIL,ZESTRIL) 10 MG tablet Take 1 tablet by mouth 2 (Two) Times a Day. 180 tablet 3     No facility-administered medications prior to visit.     No opioid medication identified on active medication list. I have reviewed chart for other potential  high risk medication/s and harmful drug interactions in the elderly.      Aspirin is on active medication list. Aspirin use is indicated based on review of current medical condition/s. Pros and cons of this therapy have been discussed today. Benefits of this medication outweigh potential harm.  Patient has been encouraged to continue taking this medication.  .      Patient Active Problem List   Diagnosis    Arthralgia of multiple joints    Generalized anxiety disorder    Gastroesophageal reflux disease without esophagitis    Dyslipidemia    Primary hypertension    Insomnia    Bradycardia    Chronic left-sided low back pain without sciatica    Idiopathic peripheral neuropathy    Coronary artery disease involving native coronary artery without angina pectoris    Chronic tension-type headache, not intractable    Adhesive capsulitis of right shoulder    Rotator cuff injury, right, initial encounter    Near syncope    Paroxysmal atrial fibrillation    Weakness of both lower extremities    Chronic right hip pain    Atherosclerosis of native arteries of extremities with intermittent claudication, bilateral legs    Atrial flutter with rapid ventricular response     Advance Care Planning Advance Directive is not on file.  ACP discussion was held with the patient during this visit. Patient does not have an advance directive, declines further assistance.            Objective   Vitals:    12/30/24 1358   BP: 108/60   BP Location: Left arm   Patient Position: Sitting   Cuff Size: Adult   Pulse: 60   Resp: 14   Temp: 98 °F (36.7 °C)   TempSrc: Infrared   SpO2: 97%   Weight: 102 kg (225 lb)   Height: 180.3 cm (70.98\")   PainSc: 0-No pain       Estimated body mass index is " "31.4 kg/m² as calculated from the following:    Height as of this encounter: 180.3 cm (70.98\").    Weight as of this encounter: 102 kg (225 lb).                Does the patient have evidence of cognitive impairment? No                                                                                                Health  Risk Assessment    Smoking Status:  Social History     Tobacco Use   Smoking Status Never    Passive exposure: Never   Smokeless Tobacco Current   Tobacco Comments    Chews on cigars     Alcohol Consumption:  Social History     Substance and Sexual Activity   Alcohol Use Not Currently       Fall Risk Screen  STEADI Fall Risk Assessment was completed, and patient is at LOW risk for falls.Assessment completed on:2024    Depression Screening   Little interest or pleasure in doing things? Not at all   Feeling down, depressed, or hopeless? Not at all   PHQ-2 Total Score 0      Health Habits and Functional and Cognitive Screenin/30/2024     2:01 PM   Functional & Cognitive Status   Do you have difficulty preparing food and eating? No   Do you have difficulty bathing yourself, getting dressed or grooming yourself? Yes   Do you have difficulty using the toilet? Yes   Do you have difficulty moving around from place to place? Yes   Do you have trouble with steps or getting out of a bed or a chair? Yes   Current Diet Unhealthy Diet   Dental Exam Not up to date   Eye Exam Not up to date   Exercise (times per week) 0 times per week   Current Exercises Include No Regular Exercise   Do you need help using the phone?  No   Are you deaf or do you have serious difficulty hearing?  Yes   Do you need help to go to places out of walking distance? Yes   Do you need help shopping? No   Do you need help preparing meals?  No   Do you need help with housework?  No   Do you need help with laundry? No   Do you need help taking your medications? Yes   Do you need help managing money? No   Do you ever drive or " ride in a car without wearing a seat belt? No   Have you felt unusual stress, anger or loneliness in the last month? No   Who do you live with? Spouse   If you need help, do you have trouble finding someone available to you? No   Have you been bothered in the last four weeks by sexual problems? No   Do you have difficulty concentrating, remembering or making decisions? Yes           Age-appropriate Screening Schedule:  Refer to the list below for future screening recommendations based on patient's age, sex and/or medical conditions. Orders for these recommended tests are listed in the plan section. The patient has been provided with a written plan.    Health Maintenance List  Health Maintenance   Topic Date Due    ZOSTER VACCINE (2 of 3) 08/20/2017    COVID-19 Vaccine (1 - 2024-25 season) Never done    LIPID PANEL  12/27/2024    RSV Vaccine - Adults (1 - 1-dose 75+ series) 04/18/2025 (Originally 4/2/2022)    BMI FOLLOWUP  09/04/2025    ANNUAL WELLNESS VISIT  12/30/2025    COLORECTAL CANCER SCREENING  08/12/2029    HEPATITIS C SCREENING  Completed    INFLUENZA VACCINE  Completed    Pneumococcal Vaccine 65+  Completed    TDAP/TD VACCINES  Discontinued                                                                                                                                                CMS Preventative Services Quick Reference  Risk Factors Identified During Encounter  Fall Risk-High or Moderate: Discussed Fall Prevention in the home    The above risks/problems have been discussed with the patient.  Pertinent information has been shared with the patient in the After Visit Summary.  An After Visit Summary and PPPS were made available to the patient.    Follow Up:   Next Medicare Wellness visit to be scheduled in 1 year.         Additional E&M Note during same encounter follows:  Patient has additional, significant, and separately identifiable condition(s)/problem(s) that require work above and beyond the Medicare  "Wellness Visit     Chief Complaint  Medicare Wellness-subsequent    Subjective   HPI  Tristan is also being seen today for an annual adult preventative physical exam.  and Tristan is also being seen today for additional medical problem/s.  Continues to have chronic dizziness, not sure about [facility] at this time  Continues to have tremors, instable gait, spends most of his time in the recliner, etc  Has anxiety/insmonia, on prn xanax and vraylar   HTN/CHF/PAF, follows with cards, request combo BP fluid medication and to take once a day                 Objective   Vital Signs:  /60 (BP Location: Left arm, Patient Position: Sitting, Cuff Size: Adult)   Pulse 60   Temp 98 °F (36.7 °C) (Infrared)   Resp 14   Ht 180.3 cm (70.98\")   Wt 102 kg (225 lb)   SpO2 97%   BMI 31.40 kg/m²   Physical Exam  Vitals and nursing note reviewed.   Constitutional:       Appearance: He is well-developed.   HENT:      Head: Normocephalic and atraumatic.   Eyes:      Pupils: Pupils are equal, round, and reactive to light.   Cardiovascular:      Rate and Rhythm: Normal rate and regular rhythm.      Heart sounds: Normal heart sounds.   Pulmonary:      Effort: Pulmonary effort is normal.      Breath sounds: Normal breath sounds.   Abdominal:      General: Bowel sounds are normal. There is no distension.      Palpations: Abdomen is soft.      Tenderness: There is no abdominal tenderness.   Musculoskeletal:      Cervical back: Neck supple.   Skin:     General: Skin is warm and dry.   Neurological:      General: No focal deficit present.      Mental Status: He is alert and oriented to person, place, and time.      Motor: Weakness present.      Gait: Gait abnormal.   Psychiatric:         Mood and Affect: Mood normal.         Behavior: Behavior normal.                       Assessment and Plan            Primary hypertension      Orders:    lisinopril-hydrochlorothiazide (Zestoretic) 20-12.5 MG per tablet; Take 1 tablet by mouth " Daily.    Parkinsonian features  -- trial and refer to neuro  Orders:    carbidopa-levodopa ER (SINEMET CR)  MG per tablet; Take 1 tablet by mouth 2 (Two) Times a Day.    Ambulatory Referral to Neurology    Movement disorder    Orders:    carbidopa-levodopa ER (SINEMET CR)  MG per tablet; Take 1 tablet by mouth 2 (Two) Times a Day.    Ambulatory Referral to Neurology    Generalized anxiety disorder  -- continue current meds and f/u with behavior health          Medicare annual wellness visit, subsequent  -- wellness visit performed, avoid prolonged fasting periods, ensure good hydration, stay active, yearly eye exam         Insomnia, unspecified type         Paroxysmal atrial fibrillation         Mood disorder           Mild episode of recurrent major depressive disorder           Dyslipidemia         Preventative health care         Dizziness  -- he needs to see  as he has met my capacity for treating this, we discussed                 Follow Up   3 months   Patient was given instructions and counseling regarding his condition or for health maintenance advice. Please see specific information pulled into the AVS if appropriate.

## 2024-12-30 NOTE — ASSESSMENT & PLAN NOTE
Orders:    lisinopril-hydrochlorothiazide (Zestoretic) 20-12.5 MG per tablet; Take 1 tablet by mouth Daily.

## 2025-01-09 RX ORDER — ESCITALOPRAM OXALATE 10 MG/1
10 TABLET ORAL DAILY
Qty: 90 TABLET | Refills: 0 | Status: SHIPPED | OUTPATIENT
Start: 2025-01-09

## 2025-01-16 DIAGNOSIS — G47.00 INSOMNIA, UNSPECIFIED TYPE: ICD-10-CM

## 2025-01-16 DIAGNOSIS — F41.1 GENERALIZED ANXIETY DISORDER: ICD-10-CM

## 2025-01-16 NOTE — TELEPHONE ENCOUNTER
Rx Refill Note  Requested Prescriptions     Pending Prescriptions Disp Refills    ALPRAZolam (XANAX) 0.5 MG tablet [Pharmacy Med Name: ALPRAZOLAM 0.5MG TABLET] 30 tablet 1     Sig: TAKE ONE (1) TABLET BY MOUTH EVERY NIGHT AS NEEDED FOR SLEEP/ANXIETY      Last office visit with prescribing clinician: 12/30/2024   Last telemedicine visit with prescribing clinician: Visit date not found   Next office visit with prescribing clinician: 3/31/2025                         Would you like a call back once the refill request has been completed: [] Yes [] No    If the office needs to give you a call back, can they leave a voicemail: [] Yes [] No    Mandy Valle MA  01/16/25, 08:50 EST

## 2025-01-17 RX ORDER — ALPRAZOLAM 0.5 MG
TABLET ORAL
Qty: 30 TABLET | Refills: 1 | Status: SHIPPED | OUTPATIENT
Start: 2025-01-17

## 2025-01-17 NOTE — TELEPHONE ENCOUNTER
Rx Refill Note  Requested Prescriptions     Pending Prescriptions Disp Refills    ALPRAZolam (XANAX) 0.5 MG tablet [Pharmacy Med Name: ALPRAZOLAM 0.5MG TABLET] 30 tablet 1     Sig: TAKE ONE (1) TABLET BY MOUTH EVERY NIGHT AS NEEDED FOR SLEEP/ANXIETY      Last office visit with prescribing clinician: 12/30/2024   Last telemedicine visit with prescribing clinician: Visit date not found   Next office visit with prescribing clinician: 1/17/2025                         Would you like a call back once the refill request has been completed: [] Yes [] No    If the office needs to give you a call back, can they leave a voicemail: [] Yes [] No    Nelda Adams, SARA  01/17/25, 10:03 EST

## 2025-01-17 NOTE — TELEPHONE ENCOUNTER
Caller: Alayna Onofre    Relationship: Emergency Contact    Best call back number: [phone]    Requested Prescriptions:   Requested Prescriptions     Pending Prescriptions Disp Refills    ALPRAZolam (XANAX) 0.5 MG tablet [Pharmacy Med Name: ALPRAZOLAM 0.5MG TABLET] 30 tablet 1     Sig: TAKE ONE (1) TABLET BY MOUTH EVERY NIGHT AS NEEDED FOR SLEEP/ANXIETY        Pharmacy where request should be sent: [address] [phone] PH - [phone] FX     Last office visit with prescribing clinician: 12/30/2024   Last telemedicine visit with prescribing clinician: Visit date not found   Next office visit with prescribing clinician: 1/17/2025         Does the patient have less than a 3 day supply:  [x] Yes  [] No      Renny Rosa Rep   01/17/25 09:29 EST

## 2025-01-23 ENCOUNTER — APPOINTMENT (OUTPATIENT)
Dept: GENERAL RADIOLOGY | Facility: HOSPITAL | Age: 80
End: 2025-01-23
Payer: MEDICARE

## 2025-01-23 ENCOUNTER — HOSPITAL ENCOUNTER (OUTPATIENT)
Facility: HOSPITAL | Age: 80
Setting detail: OBSERVATION
Discharge: HOME HEALTH | End: 2025-01-26
Attending: STUDENT IN AN ORGANIZED HEALTH CARE EDUCATION/TRAINING PROGRAM | Admitting: FAMILY MEDICINE
Payer: MEDICARE

## 2025-01-23 DIAGNOSIS — G60.9 IDIOPATHIC PERIPHERAL NEUROPATHY: ICD-10-CM

## 2025-01-23 DIAGNOSIS — R53.1 GENERALIZED WEAKNESS: ICD-10-CM

## 2025-01-23 DIAGNOSIS — I10 PRIMARY HYPERTENSION: ICD-10-CM

## 2025-01-23 DIAGNOSIS — M25.50 ARTHRALGIA OF MULTIPLE JOINTS: ICD-10-CM

## 2025-01-23 DIAGNOSIS — J10.1 INFLUENZA A: Primary | ICD-10-CM

## 2025-01-23 LAB
ALBUMIN SERPL-MCNC: 4.3 G/DL (ref 3.5–5.2)
ALBUMIN/GLOB SERPL: 1.7 G/DL
ALP SERPL-CCNC: 64 U/L (ref 39–117)
ALT SERPL W P-5'-P-CCNC: 19 U/L (ref 1–41)
ANION GAP SERPL CALCULATED.3IONS-SCNC: 14.4 MMOL/L (ref 5–15)
AST SERPL-CCNC: 22 U/L (ref 1–40)
BASOPHILS # BLD AUTO: 0.07 10*3/MM3 (ref 0–0.2)
BASOPHILS NFR BLD AUTO: 0.9 % (ref 0–1.5)
BILIRUB SERPL-MCNC: 0.4 MG/DL (ref 0–1.2)
BUN SERPL-MCNC: 16 MG/DL (ref 8–23)
BUN/CREAT SERPL: 16.7 (ref 7–25)
CALCIUM SPEC-SCNC: 9.2 MG/DL (ref 8.6–10.5)
CHLORIDE SERPL-SCNC: 101 MMOL/L (ref 98–107)
CO2 SERPL-SCNC: 24.6 MMOL/L (ref 22–29)
CREAT SERPL-MCNC: 0.96 MG/DL (ref 0.76–1.27)
DEPRECATED RDW RBC AUTO: 39.8 FL (ref 37–54)
EGFRCR SERPLBLD CKD-EPI 2021: 81.4 ML/MIN/1.73
EOSINOPHIL # BLD AUTO: 0.02 10*3/MM3 (ref 0–0.4)
EOSINOPHIL NFR BLD AUTO: 0.3 % (ref 0.3–6.2)
ERYTHROCYTE [DISTWIDTH] IN BLOOD BY AUTOMATED COUNT: 12 % (ref 12.3–15.4)
FLUAV RNA RESP QL NAA+PROBE: DETECTED
FLUBV RNA RESP QL NAA+PROBE: NOT DETECTED
GEN 5 1HR TROPONIN T REFLEX: 14 NG/L
GLOBULIN UR ELPH-MCNC: 2.6 GM/DL
GLUCOSE SERPL-MCNC: 140 MG/DL (ref 65–99)
HCT VFR BLD AUTO: 39.7 % (ref 37.5–51)
HGB BLD-MCNC: 13.9 G/DL (ref 13–17.7)
HOLD SPECIMEN: NORMAL
HOLD SPECIMEN: NORMAL
IMM GRANULOCYTES # BLD AUTO: 0.02 10*3/MM3 (ref 0–0.05)
IMM GRANULOCYTES NFR BLD AUTO: 0.3 % (ref 0–0.5)
LYMPHOCYTES # BLD AUTO: 0.58 10*3/MM3 (ref 0.7–3.1)
LYMPHOCYTES NFR BLD AUTO: 7.7 % (ref 19.6–45.3)
MAGNESIUM SERPL-MCNC: 1.6 MG/DL (ref 1.6–2.4)
MCH RBC QN AUTO: 31.5 PG (ref 26.6–33)
MCHC RBC AUTO-ENTMCNC: 35 G/DL (ref 31.5–35.7)
MCV RBC AUTO: 90 FL (ref 79–97)
MONOCYTES # BLD AUTO: 0.96 10*3/MM3 (ref 0.1–0.9)
MONOCYTES NFR BLD AUTO: 12.7 % (ref 5–12)
NEUTROPHILS NFR BLD AUTO: 5.9 10*3/MM3 (ref 1.7–7)
NEUTROPHILS NFR BLD AUTO: 78.1 % (ref 42.7–76)
NRBC BLD AUTO-RTO: 0 /100 WBC (ref 0–0.2)
PLATELET # BLD AUTO: 146 10*3/MM3 (ref 140–450)
PMV BLD AUTO: 11.1 FL (ref 6–12)
POTASSIUM SERPL-SCNC: 3.9 MMOL/L (ref 3.5–5.2)
PROT SERPL-MCNC: 6.9 G/DL (ref 6–8.5)
RBC # BLD AUTO: 4.41 10*6/MM3 (ref 4.14–5.8)
SARS-COV-2 RNA RESP QL NAA+PROBE: NOT DETECTED
SODIUM SERPL-SCNC: 140 MMOL/L (ref 136–145)
TROPONIN T NUMERIC DELTA: 0 NG/L
TROPONIN T SERPL HS-MCNC: 14 NG/L
WBC NRBC COR # BLD AUTO: 7.55 10*3/MM3 (ref 3.4–10.8)
WHOLE BLOOD HOLD COAG: NORMAL
WHOLE BLOOD HOLD SPECIMEN: NORMAL

## 2025-01-23 PROCEDURE — 99285 EMERGENCY DEPT VISIT HI MDM: CPT | Performed by: STUDENT IN AN ORGANIZED HEALTH CARE EDUCATION/TRAINING PROGRAM

## 2025-01-23 PROCEDURE — G0378 HOSPITAL OBSERVATION PER HR: HCPCS

## 2025-01-23 PROCEDURE — 87636 SARSCOV2 & INF A&B AMP PRB: CPT | Performed by: STUDENT IN AN ORGANIZED HEALTH CARE EDUCATION/TRAINING PROGRAM

## 2025-01-23 PROCEDURE — 25010000002 KETOROLAC TROMETHAMINE PER 15 MG: Performed by: STUDENT IN AN ORGANIZED HEALTH CARE EDUCATION/TRAINING PROGRAM

## 2025-01-23 PROCEDURE — 83735 ASSAY OF MAGNESIUM: CPT | Performed by: STUDENT IN AN ORGANIZED HEALTH CARE EDUCATION/TRAINING PROGRAM

## 2025-01-23 PROCEDURE — 84484 ASSAY OF TROPONIN QUANT: CPT | Performed by: STUDENT IN AN ORGANIZED HEALTH CARE EDUCATION/TRAINING PROGRAM

## 2025-01-23 PROCEDURE — 93005 ELECTROCARDIOGRAM TRACING: CPT | Performed by: STUDENT IN AN ORGANIZED HEALTH CARE EDUCATION/TRAINING PROGRAM

## 2025-01-23 PROCEDURE — 51702 INSERT TEMP BLADDER CATH: CPT

## 2025-01-23 PROCEDURE — 85025 COMPLETE CBC W/AUTO DIFF WBC: CPT | Performed by: STUDENT IN AN ORGANIZED HEALTH CARE EDUCATION/TRAINING PROGRAM

## 2025-01-23 PROCEDURE — 71045 X-RAY EXAM CHEST 1 VIEW: CPT

## 2025-01-23 PROCEDURE — 25810000003 SODIUM CHLORIDE 0.9 % SOLUTION: Performed by: STUDENT IN AN ORGANIZED HEALTH CARE EDUCATION/TRAINING PROGRAM

## 2025-01-23 PROCEDURE — 99222 1ST HOSP IP/OBS MODERATE 55: CPT | Performed by: INTERNAL MEDICINE

## 2025-01-23 PROCEDURE — 80053 COMPREHEN METABOLIC PANEL: CPT | Performed by: STUDENT IN AN ORGANIZED HEALTH CARE EDUCATION/TRAINING PROGRAM

## 2025-01-23 RX ORDER — POLYETHYLENE GLYCOL 3350 17 G/17G
17 POWDER, FOR SOLUTION ORAL DAILY PRN
Status: DISCONTINUED | OUTPATIENT
Start: 2025-01-23 | End: 2025-01-26 | Stop reason: HOSPADM

## 2025-01-23 RX ORDER — ACETAMINOPHEN 325 MG/1
650 TABLET ORAL EVERY 4 HOURS PRN
Status: DISCONTINUED | OUTPATIENT
Start: 2025-01-23 | End: 2025-01-26 | Stop reason: HOSPADM

## 2025-01-23 RX ORDER — SODIUM CHLORIDE 0.9 % (FLUSH) 0.9 %
10 SYRINGE (ML) INJECTION AS NEEDED
Status: DISCONTINUED | OUTPATIENT
Start: 2025-01-23 | End: 2025-01-26 | Stop reason: HOSPADM

## 2025-01-23 RX ORDER — DIPHENOXYLATE HYDROCHLORIDE AND ATROPINE SULFATE 2.5; .025 MG/1; MG/1
1 TABLET ORAL DAILY
COMMUNITY

## 2025-01-23 RX ORDER — KETOROLAC TROMETHAMINE 30 MG/ML
15 INJECTION, SOLUTION INTRAMUSCULAR; INTRAVENOUS ONCE
Status: COMPLETED | OUTPATIENT
Start: 2025-01-23 | End: 2025-01-23

## 2025-01-23 RX ORDER — SODIUM CHLORIDE 0.9 % (FLUSH) 0.9 %
10 SYRINGE (ML) INJECTION EVERY 12 HOURS SCHEDULED
Status: DISCONTINUED | OUTPATIENT
Start: 2025-01-23 | End: 2025-01-26 | Stop reason: HOSPADM

## 2025-01-23 RX ORDER — ESCITALOPRAM OXALATE 10 MG/1
10 TABLET ORAL DAILY
Status: DISCONTINUED | OUTPATIENT
Start: 2025-01-24 | End: 2025-01-26 | Stop reason: HOSPADM

## 2025-01-23 RX ORDER — OSELTAMIVIR PHOSPHATE 75 MG/1
75 CAPSULE ORAL ONCE
Status: DISCONTINUED | OUTPATIENT
Start: 2025-01-23 | End: 2025-01-23

## 2025-01-23 RX ORDER — AMOXICILLIN 250 MG
2 CAPSULE ORAL 2 TIMES DAILY PRN
Status: DISCONTINUED | OUTPATIENT
Start: 2025-01-23 | End: 2025-01-26 | Stop reason: HOSPADM

## 2025-01-23 RX ORDER — OSELTAMIVIR PHOSPHATE 75 MG/1
75 CAPSULE ORAL EVERY 12 HOURS SCHEDULED
Status: DISCONTINUED | OUTPATIENT
Start: 2025-01-23 | End: 2025-01-26 | Stop reason: HOSPADM

## 2025-01-23 RX ORDER — ALPRAZOLAM 0.5 MG
0.5 TABLET ORAL NIGHTLY PRN
Status: DISCONTINUED | OUTPATIENT
Start: 2025-01-23 | End: 2025-01-26 | Stop reason: HOSPADM

## 2025-01-23 RX ORDER — PANTOPRAZOLE SODIUM 40 MG/1
40 TABLET, DELAYED RELEASE ORAL
Status: DISCONTINUED | OUTPATIENT
Start: 2025-01-24 | End: 2025-01-26 | Stop reason: HOSPADM

## 2025-01-23 RX ORDER — BISACODYL 5 MG/1
5 TABLET, DELAYED RELEASE ORAL DAILY PRN
Status: DISCONTINUED | OUTPATIENT
Start: 2025-01-23 | End: 2025-01-26 | Stop reason: HOSPADM

## 2025-01-23 RX ORDER — BISACODYL 10 MG
10 SUPPOSITORY, RECTAL RECTAL DAILY PRN
Status: DISCONTINUED | OUTPATIENT
Start: 2025-01-23 | End: 2025-01-26 | Stop reason: HOSPADM

## 2025-01-23 RX ORDER — CARBIDOPA AND LEVODOPA 25; 100 MG/1; MG/1
1 TABLET, EXTENDED RELEASE ORAL 2 TIMES DAILY
Status: DISCONTINUED | OUTPATIENT
Start: 2025-01-23 | End: 2025-01-26 | Stop reason: HOSPADM

## 2025-01-23 RX ORDER — ASPIRIN 81 MG/1
81 TABLET, CHEWABLE ORAL DAILY
Status: DISCONTINUED | OUTPATIENT
Start: 2025-01-24 | End: 2025-01-26 | Stop reason: HOSPADM

## 2025-01-23 RX ORDER — ACETAMINOPHEN 650 MG/1
650 SUPPOSITORY RECTAL EVERY 4 HOURS PRN
Status: DISCONTINUED | OUTPATIENT
Start: 2025-01-23 | End: 2025-01-26 | Stop reason: HOSPADM

## 2025-01-23 RX ORDER — SODIUM CHLORIDE 9 MG/ML
40 INJECTION, SOLUTION INTRAVENOUS AS NEEDED
Status: DISCONTINUED | OUTPATIENT
Start: 2025-01-23 | End: 2025-01-26 | Stop reason: HOSPADM

## 2025-01-23 RX ORDER — HYDROCHLOROTHIAZIDE 12.5 MG/1
12.5 TABLET ORAL
Status: DISCONTINUED | OUTPATIENT
Start: 2025-01-24 | End: 2025-01-26 | Stop reason: HOSPADM

## 2025-01-23 RX ORDER — LISINOPRIL 20 MG/1
20 TABLET ORAL
Status: DISCONTINUED | OUTPATIENT
Start: 2025-01-24 | End: 2025-01-26 | Stop reason: HOSPADM

## 2025-01-23 RX ORDER — ONDANSETRON 2 MG/ML
4 INJECTION INTRAMUSCULAR; INTRAVENOUS EVERY 6 HOURS PRN
Status: DISCONTINUED | OUTPATIENT
Start: 2025-01-23 | End: 2025-01-26 | Stop reason: HOSPADM

## 2025-01-23 RX ORDER — ACETAMINOPHEN 160 MG/5ML
650 SOLUTION ORAL EVERY 4 HOURS PRN
Status: DISCONTINUED | OUTPATIENT
Start: 2025-01-23 | End: 2025-01-26 | Stop reason: HOSPADM

## 2025-01-23 RX ORDER — OSELTAMIVIR PHOSPHATE 75 MG/1
75 CAPSULE ORAL EVERY 12 HOURS SCHEDULED
Status: DISCONTINUED | OUTPATIENT
Start: 2025-01-23 | End: 2025-01-23

## 2025-01-23 RX ADMIN — OSELTAMIVIR PHOSPHATE 75 MG: 75 CAPSULE ORAL at 20:20

## 2025-01-23 RX ADMIN — SODIUM CHLORIDE 500 ML: 9 INJECTION, SOLUTION INTRAVENOUS at 09:57

## 2025-01-23 RX ADMIN — APIXABAN 5 MG: 5 TABLET, FILM COATED ORAL at 20:20

## 2025-01-23 RX ADMIN — ACETAMINOPHEN 650 MG: 325 TABLET, FILM COATED ORAL at 17:54

## 2025-01-23 RX ADMIN — KETOROLAC TROMETHAMINE 15 MG: 30 INJECTION, SOLUTION INTRAMUSCULAR; INTRAVENOUS at 10:14

## 2025-01-23 RX ADMIN — Medication 10 ML: at 13:04

## 2025-01-23 RX ADMIN — ALPRAZOLAM 0.5 MG: 0.5 TABLET ORAL at 20:20

## 2025-01-23 RX ADMIN — OSELTAMIVIR PHOSPHATE 75 MG: 75 CAPSULE ORAL at 13:03

## 2025-01-23 RX ADMIN — CARBIDOPA AND LEVODOPA 1 TABLET: 25; 100 TABLET, EXTENDED RELEASE ORAL at 20:20

## 2025-01-23 RX ADMIN — Medication 10 ML: at 20:24

## 2025-01-23 NOTE — ED NOTES
"Attempted ambulation of the patient. His words \"How am I supposed to walk around the room when I can't even get up\"  "

## 2025-01-23 NOTE — H&P
[facility]   HOSPITALIST HISTORY AND PHYSICAL  Date: 2025   Patient Name: Tristan Hillman  : 1945  MRN: [id number]  Primary Care Physician:  Kerri Chew APRN  Date of admission: 2025    Subjective   Subjective     Chief Complaint: Generalized weakness    HPI:    Tristan Hillman is a 79 y.o. 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  Patient denies any other fevers, chills, sweats, nausea, vomiting, chest pain, shortness of breath, palpitations, abdominal pain diarrhea constipation, dysuria, rash.  Found to be insulin today positive.  Was started on Tamiflu and will be admitted for ongoing monitoring and management.      Personal History     Past Medical History:  Past Medical History:   Diagnosis Date   • Ankle sprain    • Anxiety    • Arthritis of back    • Arthritis of neck    • Bursitis of hip    • Coronary artery disease    • GERD (gastroesophageal reflux disease)    • Hip arthrosis    • History of insomnia    • Hyperlipidemia    • Hypertension    • Low back pain    • Low back strain    • Neck strain    • Periarthritis of shoulder    • Rotator cuff syndrome    • Tennis elbow          Past Surgical History:  Past Surgical History:   Procedure Laterality Date   • CORONARY STENT PLACEMENT           Family History:   Family History   Problem Relation Age of Onset   • Heart disease Mother    • Hypertension Mother    • Heart attack Mother    • Heart attack Father    • Hyperlipidemia Sister    • Cancer Sister    • Diabetes Sister    • Hypertension Sister    • Heart attack Brother    ,    Social History:   Social History     Tobacco Use   • Smoking status: Never     Passive exposure: Never   • Smokeless tobacco: Current   • Tobacco comments:     Chews on cigars   Vaping Use   • Vaping status: Never Used   Substance Use Topics   • Alcohol use: Not Currently    • Drug use: No         Home Medications:  ALPRAZolam, Cariprazine HCl, apixaban, aspirin, carbidopa-levodopa ER, coenzyme Q10, escitalopram, lisinopril-hydrochlorothiazide, nitroglycerin, nystatin, omeprazole, and rosuvastatin    Allergies:  No Known Allergies    Review of Systems   All systems were reviewed and negative except for: Generalized weakness    Objective   Objective     Vitals:   Temp:  [98.6 °F (37 °C)] 98.6 °F (37 °C)  Heart Rate:  [74-92] 82  Resp:  [20] 20  BP: (104-149)/(64-90) 149/86    Physical Exam    Constitutional: Awake, alert, no acute distress   Eyes: Pupils equal, sclerae anicteric, no conjunctival injection   HENT: NCAT, mucous membranes moist   Neck: Supple, no thyromegaly, no lymphadenopathy, trachea midline   Respiratory: Clear to auscultation bilaterally, nonlabored respirations    Cardiovascular: RRR, no murmurs, rubs, or gallops, palpable pedal pulses bilaterally   Gastrointestinal: Positive bowel sounds, soft, nontender, nondistended   Musculoskeletal: No bilateral ankle edema, no clubbing or cyanosis to extremities   Psychiatric: Appropriate affect, cooperative   Neurologic: Oriented x 3, strength symmetric in all extremities, Cranial Nerves grossly intact to confrontation, speech clear   Skin: No rashes     Result Review    Result Review:  I have personally reviewed the results from the time of this admission to 1/23/2025 13:28 EST and agree with these findings:  [x]  Laboratory  []  Microbiology  [x]  Radiology  []  EKG/Telemetry   []  Cardiology/Vascular   []  Pathology  []  Old records  []  Other:      Assessment & Plan   Assessment / Plan     Assessment/Plan:   Influenza A: Supportive care.  Tamiflu  Generalized weakness/movement disorder: Continue Sinemet.  Supportive care.  PT to evaluate  Hypertension: Add recommendations and titrate as needed  Paroxysmal SVT/A-fib: Per outpatient cardiology note.  Continue Eliquis and other home regimen.  Addendum: Patient retaining  urine in the emergency department.  Will place Scott tonight.    VTE Prophylaxis:  Pharmacologic VTE prophylaxis orders are present.        CODE STATUS:    Code Status (Patient has no pulse and is not breathing): CPR (Attempt to Resuscitate)  Medical Interventions (Patient has pulse or is breathing): Full Support      Admission Status:  I believe this patient meets observation status.    Electronically signed by Ramírez Stout Jr, MD, 01/23/25, 1:28 PM EST.

## 2025-01-23 NOTE — ED PROVIDER NOTES
[facility]  Emergency Department Encounter  Emergency Medicine Physician Note       Pt Name: Tristan Hillman  MRN: [id number]  Pt :   1945  Room Number:    Date of encounter:  2025  PCP: Kerri Chew APRN  ED Physician: Francisco Whitmore DO    HPI:  Tristan Hillman is a 79 y.o. male who presents to the ED with chief complaint of GENERALIZED WEAKNESS.  Patient reports he has been sick for the past 4 days.  States that he is weak to the point that he is unable to even get out of bed.  Reports associated shortness of breath and cough.  Cough is nonproductive.  Duration constant.  No modifying factors.    Pertinent past medical history: Anxiety/insomnia, hypertension, CHF, paroxysmal A-fib.  Patient is currently being worked up for movement disorder with suspected Parkinson disease.  Patient is currently on Sinemet.  Walks with a walker at baseline.    Patient's daughter is at bedside contributes to HPI.    PAST MEDICAL HISTORY  Past Medical History:   Diagnosis Date    Ankle sprain     Anxiety     Arthritis of back     Arthritis of neck     Bursitis of hip     Coronary artery disease     GERD (gastroesophageal reflux disease)     Hip arthrosis     History of insomnia     Hyperlipidemia     Hypertension     Low back pain     Low back strain     Neck strain     Periarthritis of shoulder     Rotator cuff syndrome     Tennis elbow      Current Outpatient Medications   Medication Instructions    ALPRAZolam (XANAX) 0.5 MG tablet TAKE ONE (1) TABLET BY MOUTH EVERY NIGHT AS NEEDED FOR SLEEP/ANXIETY    aspirin 81 mg, Oral, Daily    carbidopa-levodopa ER (SINEMET CR)  MG per tablet 1 tablet, Oral, 2 Times Daily    Cariprazine HCl (VRAYLAR) 1.5 mg, Oral, Daily    coenzyme Q10 100 mg, Daily    Eliquis 5 mg, Oral, 2 Times Daily    escitalopram (LEXAPRO) 10 mg, Oral, Daily    lisinopril-hydrochlorothiazide (Zestoretic) 20-12.5 MG per tablet 1 tablet, Oral, Daily    nitroglycerin (NITROSTAT) 0.4  MG SL tablet place 1 tablet under the tongue if needed every 5 minutes fo    nystatin (MYCOSTATIN) 986439 UNIT/GM powder Topical, 3 Times Daily    omeprazole (priLOSEC) 20 MG capsule TAKE 1 CAPSULE BY MOUTH DAILY - MUST HAVE APPOINTMENT FOR FURTHER REFILLS    rosuvastatin (CRESTOR) 5 mg, Oral, Daily      PAST SURGICAL HISTORY  Past Surgical History:   Procedure Laterality Date    CORONARY STENT PLACEMENT         FAMILY HISTORY  Family History   Problem Relation Age of Onset    Heart disease Mother     Hypertension Mother     Heart attack Mother     Heart attack Father     Hyperlipidemia Sister     Cancer Sister     Diabetes Sister     Hypertension Sister     Heart attack Brother        SOCIAL HISTORY  Social History     Socioeconomic History    Marital status:    Tobacco Use    Smoking status: Never     Passive exposure: Never    Smokeless tobacco: Current    Tobacco comments:     Chews on cigars   Vaping Use    Vaping status: Never Used   Substance and Sexual Activity    Alcohol use: Not Currently    Drug use: No    Sexual activity: Not Currently     Partners: Female     ALLERGIES  Patient has no known allergies.    REVIEW OF SYSTEMS  All systems reviewed and negative except for those discussed in HPI.     PHYSICAL EXAM  ED Triage Vitals [01/23/25 0918]   Temp Heart Rate Resp BP SpO2   98.6 °F (37 °C) 74 20 138/76 93 %      Temp src Heart Rate Source Patient Position BP Location FiO2 (%)   -- -- -- -- --     I have reviewed the triage vital signs and nursing notes.    General: Alert.  Appears chronically ill, but nontoxic.  No acute distress.  Head: Normocephalic.  Atraumatic.  Eyes: No scleral icterus.  ENT: Moist mucous membranes.  Cardiovascular: Regular rate and rhythm.  No murmurs.  No rubs.  2+ distal pulses bilaterally.  Respiratory: Equal breath sounds bilaterally. No wheezing. No rales.  No rhonchi.  GI: Abdomen is soft.  Nondistended.  Nontender to palpation.  No rebound.  No guarding.  No CVA  tenderness.  MSK: Moves all 4 extremities.  No muscle atrophy.  Neurologic: Oriented x 3.  No focal deficits.  Skin: No edema. No erythema. No pallor. No cyanosis.  Psych: Normal mood and affect.    LAB RESULTS  Recent Results (from the past 24 hours)   Comprehensive Metabolic Panel    Collection Time: 01/23/25  9:20 AM    Specimen: Blood   Result Value Ref Range    Glucose 140 (H) 65 - 99 mg/dL    BUN 16 8 - 23 mg/dL    Creatinine 0.96 0.76 - 1.27 mg/dL    Sodium 140 136 - 145 mmol/L    Potassium 3.9 3.5 - 5.2 mmol/L    Chloride 101 98 - 107 mmol/L    CO2 24.6 22.0 - 29.0 mmol/L    Calcium 9.2 8.6 - 10.5 mg/dL    Total Protein 6.9 6.0 - 8.5 g/dL    Albumin 4.3 3.5 - 5.2 g/dL    ALT (SGPT) 19 1 - 41 U/L    AST (SGOT) 22 1 - 40 U/L    Alkaline Phosphatase 64 39 - 117 U/L    Total Bilirubin 0.4 0.0 - 1.2 mg/dL    Globulin 2.6 gm/dL    A/G Ratio 1.7 g/dL    BUN/Creatinine Ratio 16.7 7.0 - 25.0    Anion Gap 14.4 5.0 - 15.0 mmol/L    eGFR 81.4 >60.0 mL/min/1.73   High Sensitivity Troponin T    Collection Time: 01/23/25  9:20 AM    Specimen: Blood   Result Value Ref Range    HS Troponin T 14 <22 ng/L   Magnesium    Collection Time: 01/23/25  9:20 AM    Specimen: Blood   Result Value Ref Range    Magnesium 1.6 1.6 - 2.4 mg/dL   Green Top (Gel)    Collection Time: 01/23/25  9:20 AM   Result Value Ref Range    Extra Tube Hold for add-ons.    Lavender Top    Collection Time: 01/23/25  9:20 AM   Result Value Ref Range    Extra Tube hold for add-on    Gold Top - SST    Collection Time: 01/23/25  9:20 AM   Result Value Ref Range    Extra Tube Hold for add-ons.    Light Blue Top    Collection Time: 01/23/25  9:20 AM   Result Value Ref Range    Extra Tube Hold for add-ons.    CBC Auto Differential    Collection Time: 01/23/25  9:20 AM    Specimen: Blood   Result Value Ref Range    WBC 7.55 3.40 - 10.80 10*3/mm3    RBC 4.41 4.14 - 5.80 10*6/mm3    Hemoglobin 13.9 13.0 - 17.7 g/dL    Hematocrit 39.7 37.5 - 51.0 %    MCV 90.0 79.0 -  97.0 fL    MCH 31.5 26.6 - 33.0 pg    MCHC 35.0 31.5 - 35.7 g/dL    RDW 12.0 (L) 12.3 - 15.4 %    RDW-SD 39.8 37.0 - 54.0 fl    MPV 11.1 6.0 - 12.0 fL    Platelets 146 140 - 450 10*3/mm3    Neutrophil % 78.1 (H) 42.7 - 76.0 %    Lymphocyte % 7.7 (L) 19.6 - 45.3 %    Monocyte % 12.7 (H) 5.0 - 12.0 %    Eosinophil % 0.3 0.3 - 6.2 %    Basophil % 0.9 0.0 - 1.5 %    Immature Grans % 0.3 0.0 - 0.5 %    Neutrophils, Absolute 5.90 1.70 - 7.00 10*3/mm3    Lymphocytes, Absolute 0.58 (L) 0.70 - 3.10 10*3/mm3    Monocytes, Absolute 0.96 (H) 0.10 - 0.90 10*3/mm3    Eosinophils, Absolute 0.02 0.00 - 0.40 10*3/mm3    Basophils, Absolute 0.07 0.00 - 0.20 10*3/mm3    Immature Grans, Absolute 0.02 0.00 - 0.05 10*3/mm3    nRBC 0.0 0.0 - 0.2 /100 WBC   COVID-19 and FLU A/B PCR, 1 HR TAT - Swab, Nasopharynx    Collection Time: 01/23/25  9:20 AM    Specimen: Nasopharynx; Swab   Result Value Ref Range    COVID19 Not Detected Not Detected - Ref. Range    Influenza A PCR Detected (A) Not Detected    Influenza B PCR Not Detected Not Detected   High Sensitivity Troponin T 1Hr    Collection Time: 01/23/25 10:32 AM    Specimen: Blood   Result Value Ref Range    HS Troponin T 14 <22 ng/L    Troponin T Numeric Delta 0 Abnormal if >/=3 ng/L       RADIOLOGY  XR Chest 1 View    Result Date: 1/23/2025  PROCEDURE: XR CHEST 1 VW-  HISTORY: Weak/Dizzy/AMS triage protocol, coronary artery disease  COMPARISON:  None  FINDINGS:  Portable view of the chest demonstrates the lungs to be grossly clear. There is no evidence of effusion, pneumothorax or other significant pleural disease. The mediastinum is unremarkable.  The heart size is normal.      Unremarkable portable chest.    This report was signed and finalized on 1/23/2025 9:58 AM by Jf Murray MD.       PROCEDURES  Procedures    RISK STRATIFICATION    MEDICAL DECISION MAKING  79 y.o. male with past medical history listed above who presents with generalized weakness, shortness of breath and  cough.    Patient arrives via EMS.  I have reviewed the EMS documentation/notes and included that information in my HPI.    Vital signs within normal limits.  Pulse ox 93% on room air.    Based on clinical presentation and physical exam, differential diagnosis includes, but is not limited to, viral URI/bronchitis, viral syndrome, pneumonia, metabolic derangement, dehydration, acute coronary syndrome, CHF.    At least 3 different tests have been ordered on this patient.    Medications administered in ED:  Medications   sodium chloride 0.9 % flush 10 mL (has no administration in time range)   oseltamivir (TAMIFLU) capsule 75 mg (has no administration in time range)   sodium chloride 0.9 % bolus 500 mL (0 mL Intravenous Stopped 1/23/25 1032)   ketorolac (TORADOL) injection 15 mg (15 mg Intravenous Given 1/23/25 1014)     Please see ED course below for my interpretation of the ED workup.  ED Course as of 01/23/25 1235   Thu Jan 23, 2025   0935 ECG 12 Lead ED Triage Standing Order; Weak / Dizzy / AMS  EKG per my interpretation normal sinus rhythm, rate 83, leftward axis, no ST segment elevation or depression, normal QRS QTC intervals.   [JS]   1008 XR Chest 1 View  I have independently reviewed and interpreted the chest x-ray.  My interpretation is negative for infiltrate.    [JS]   1233 I reviewed the labs listed above. Clinically unremarkable.    Notable findings are highlighted below.    Old laboratory data was reviewed from the medical records and compared to today's results.   [JS]   1234 Influenza A PCR(!): Detected [JS]   1234 Glucose(!): 140 [JS]      ED Course User Index  [JS] Francisco Whitmore DO     On re-evaluation, patient resting comfortably.  Vital signs remained stable on room air.  Unfortunately, patient unable to pass ambulatory trial, therefore unable to safely discharge the patient. I did order empiric treatment with Tamiflu. Will proceed with medical admission for further workup and management.  I  discussed the findings of the ED workup with the patient including my recommendation for admission.  Patient agreeable with plan and disposition.    Case discussed with Dr. Stout (hospitalist) who agrees to evaluate and admit the patient.  We discussed the HPI, pertinent PMHx, ED course and workup.    Chronic conditions affecting care: None    Social determinants of health impacting treatment or disposition: None    REPEAT VITAL SIGNS  AS OF 12:35 EST VITALS:  BP - 119/71  HR - 86  TEMP - 98.6 °F (37 °C)  O2 SATS - 93%    DIAGNOSIS  Final diagnoses:   Influenza A   Generalized weakness     DISPOSITION  ED Disposition       ED Disposition   Intended Admit    Condition   --    Comment   --             Please note that portions of this document were completed with voice recognition software.        Francisco Whitmore DO  01/23/25 7656

## 2025-01-23 NOTE — CASE MANAGEMENT/SOCIAL WORK
Discharge Planning Assessment  [facility]     Patient Name: Tristan Hillman  MRN: [id number]  Today's Date: 1/23/2025    Admit Date: 1/23/2025    Plan: The patient is awake and able to answer questions.  His daughter is at bedside and he consents for her to be included in his DC plans.  He is a current patient f Kerri Chew and gets his medications from [facility].  He has a walker and rollator at home as needed. Patient has had a rapid increase in weakness and decrease in activity tolerance. Prior to today the patient was able to walk independently without weight bearing assistance.  The patient requests STR; provided patient and daughter with list of facility options.  They will update case management with facility choice at later time.  Questions and concerns were addressed, CORTES delivered at the time of this conversation.  Will provide additional resources and information upon request.   Discharge Needs Assessment       Row Name 01/23/25 1553       Living Environment    People in Home spouse    Name(s) of People in Home Kelly Hillman, Wife    Current Living Arrangements home    Duration at Residence 3 years    Potentially Unsafe Housing Conditions none    In the past 12 months has the electric, gas, oil, or water company threatened to shut off services in your home? No    Primary Care Provided by self    Provides Primary Care For no one    Caregiving Concerns Patient has had a rapid increase in weakness and decrease in activity tolerance. Prior to today the patient was able to walk independently without weight bearing assistance    Family Caregiver if Needed none    Quality of Family Relationships helpful;involved;supportive    Able to Return to Prior Arrangements other (see comments)    Living Arrangement Comments Patient plans for STR then home       Resource/Environmental Concerns    Resource/Environmental Concerns none    Transportation Concerns none       Transportation Needs    In the past 12  months, has lack of transportation kept you from medical appointments or from getting medications? no    In the past 12 months, has lack of transportation kept you from meetings, work, or from getting things needed for daily living? No       Food Insecurity    Within the past 12 months, you worried that your food would run out before you got the money to buy more. Never true    Within the past 12 months, the food you bought just didn't last and you didn't have money to get more. Never true       Transition Planning    Patient/Family Anticipates Transition to long-term care facility    Patient/Family Anticipated Services at Transition skilled nursing       Discharge Needs Assessment    Readmission Within the Last 30 Days no previous admission in last 30 days    Equipment Currently Used at Home walker, rolling;rollator    Concerns to be Addressed discharge planning    Do you want help finding or keeping work or a job? I do not need or want help    Do you want help with school or training? For example, starting or completing job training or getting a high school diploma, GED or equivalent No    Anticipated Changes Related to Illness inability to care for self    Equipment Needed After Discharge none    Outpatient/Agency/Support Group Needs homecare agency    Discharge Facility/Level of Care Needs nursing facility, skilled    Provided Post Acute Provider List? Yes    Post Acute Provider List Nursing Home    Provided Post Acute Provider Quality & Resource List? Yes    Post Acute Provider Quality and Resource List Nursing Home    Delivered To Patient;Support Person    Support Person Pricila Sandra, Daughter    Method of Delivery In person    Offered/Gave Vendor List yes    Patient's Choice of Community Agency(s) Patient/family will update CM with facility choice at later time    Current Discharge Risk physical impairment                   Discharge Plan       Row Name 01/23/25 0117       Plan    Plan The patient is  awake and able to answer questions.  His daughter is at bedside and he consents for her to be included in his DC plans.  He is a current patient f Kerri Chew and gets his medications from [facility].  He has a walker and rollator at home as needed. Patient has had a rapid increase in weakness and decrease in activity tolerance. Prior to today the patient was able to walk independently without weight bearing assistance.  The patient requests STR; provided patient and daughter with list of facility options.  They will update case management with facility choice at later time.  Questions and concerns were addressed, CORTES delivered at the time of this conversation.  Will provide additional resources and information upon request.    Patient/Family in Agreement with Plan yes    Provided Post Acute Provider List? Yes    Post Acute Provider List Nursing Home    Provided Post Acute Provider Quality & Resource List? Yes    Post Acute Provider Quality and Resource List Nursing Home    Delivered To Patient;Support Person    Support Person Sharda Nguyen    Method of Delivery In person    Plan Comments Patient/family will update case management at later time with SNF of choice    Final Note Plans for STR then home with wife                  Continued Care and Services - Admitted Since 1/23/2025    No active coordination exists for this encounter.          Demographic Summary       Row Name 01/23/25 1546       General Information    Admission Type observation    Arrived From emergency department    Required Notices Provided Observation Status Notice    Referral Source admission list    Reason for Consult discharge planning    Preferred Language English       Contact Information    Permission Granted to Share Info With ;family/designee                   Functional Status       Row Name 01/23/25 3540       Functional Status    Usual Activity Tolerance good    Current Activity Tolerance poor     Functional Status Comments Prior to today the patient was able to ambulate and perform his own ADLs.       Physical Activity    On average, how many days per week do you engage in moderate to strenuous exercise (like a brisk walk)? 0 days    On average, how many minutes do you engage in exercise at this level? 0 min    Number of minutes of exercise per week 0       Assessment of Health Literacy    How often do you have someone help you read hospital materials? Occasionally    How often do you have problems learning about your medical condition because of difficulty understanding written information? Occasionally    How often do you have a problem understanding what is told to you about your medical condition? Occasionally    How confident are you filling out medical forms by yourself? Quite a bit    Health Literacy Good       Functional Status, IADL    Medications independent    Meal Preparation independent    Housekeeping independent    Laundry independent    Shopping independent    If for any reason you need help with day-to-day activities such as bathing, preparing meals, shopping, managing finances, etc., do you get the help you need? I could use a little more help    IADL Comments Patient has had a rapid increase in weakness and decrease in activity tolerance. Prior to today the patient was able to walk independently without weight bearing assistance       Mental Status    General Appearance WDL WDL       Mental Status Summary    Recent Changes in Mental Status/Cognitive Functioning no changes                   Psychosocial       Row Name 01/23/25 6136       Values/Beliefs    Spiritual, Cultural Beliefs, Adventism Practices, Values that Affect Care no       Behavior WDL    Behavior WDL WDL       Emotion Mood WDL    Emotion/Mood/Affect WDL WDL       Speech WDL    Speech WDL WDL       Perceptual State WDL    Perceptual State WDL WDL       Thought Process WDL    Thought Process WDL WDL       Intellectual  Performance WDL    Intellectual Performance WDL WDL                   Abuse/Neglect       Row Name 01/23/25 1552       Personal Safety    Feels Unsafe at Home or Work/School no    Feels Threatened by Someone no    Does Anyone Try to Keep You From Having Contact with Others or Doing Things Outside Your Home? no    Physical Signs of Abuse Present no                   Legal       Row Name 01/23/25 1552       Financial Resource Strain    How hard is it for you to pay for the very basics like food, housing, medical care, and heating? Not hard                   Substance Abuse       Row Name 01/23/25 1552       Substance Use    Substance Use Status never used                   Patient Forms       Row Name 01/23/25 1600       Patient Forms    Patient Observation Letter Delivered    Delivered to Support person  Pricila Sandra, Daughter    Method of delivery In person                      Chery Grullon RN

## 2025-01-24 LAB
ALBUMIN SERPL-MCNC: 3.6 G/DL (ref 3.5–5.2)
ALBUMIN/GLOB SERPL: 1.4 G/DL
ALP SERPL-CCNC: 54 U/L (ref 39–117)
ALT SERPL W P-5'-P-CCNC: 10 U/L (ref 1–41)
ANION GAP SERPL CALCULATED.3IONS-SCNC: 11.3 MMOL/L (ref 5–15)
AST SERPL-CCNC: 30 U/L (ref 1–40)
BILIRUB SERPL-MCNC: 0.3 MG/DL (ref 0–1.2)
BUN SERPL-MCNC: 17 MG/DL (ref 8–23)
BUN/CREAT SERPL: 21.5 (ref 7–25)
CALCIUM SPEC-SCNC: 8.6 MG/DL (ref 8.6–10.5)
CHLORIDE SERPL-SCNC: 104 MMOL/L (ref 98–107)
CO2 SERPL-SCNC: 21.7 MMOL/L (ref 22–29)
CREAT SERPL-MCNC: 0.79 MG/DL (ref 0.76–1.27)
EGFRCR SERPLBLD CKD-EPI 2021: 91.5 ML/MIN/1.73
GLOBULIN UR ELPH-MCNC: 2.6 GM/DL
GLUCOSE SERPL-MCNC: 109 MG/DL (ref 65–99)
MAGNESIUM SERPL-MCNC: 1.7 MG/DL (ref 1.6–2.4)
POTASSIUM SERPL-SCNC: 3.8 MMOL/L (ref 3.5–5.2)
PROT SERPL-MCNC: 6.2 G/DL (ref 6–8.5)
SODIUM SERPL-SCNC: 137 MMOL/L (ref 136–145)

## 2025-01-24 PROCEDURE — 83735 ASSAY OF MAGNESIUM: CPT | Performed by: INTERNAL MEDICINE

## 2025-01-24 PROCEDURE — 97166 OT EVAL MOD COMPLEX 45 MIN: CPT

## 2025-01-24 PROCEDURE — 97162 PT EVAL MOD COMPLEX 30 MIN: CPT

## 2025-01-24 PROCEDURE — 99232 SBSQ HOSP IP/OBS MODERATE 35: CPT | Performed by: INTERNAL MEDICINE

## 2025-01-24 PROCEDURE — 80053 COMPREHEN METABOLIC PANEL: CPT | Performed by: INTERNAL MEDICINE

## 2025-01-24 PROCEDURE — G0378 HOSPITAL OBSERVATION PER HR: HCPCS

## 2025-01-24 RX ORDER — FINASTERIDE 5 MG/1
5 TABLET, FILM COATED ORAL DAILY
Status: DISCONTINUED | OUTPATIENT
Start: 2025-01-24 | End: 2025-01-25

## 2025-01-24 RX ORDER — TAMSULOSIN HYDROCHLORIDE 0.4 MG/1
0.4 CAPSULE ORAL DAILY
Status: DISCONTINUED | OUTPATIENT
Start: 2025-01-24 | End: 2025-01-26 | Stop reason: HOSPADM

## 2025-01-24 RX ORDER — IPRATROPIUM BROMIDE AND ALBUTEROL SULFATE 2.5; .5 MG/3ML; MG/3ML
3 SOLUTION RESPIRATORY (INHALATION) EVERY 4 HOURS PRN
Status: DISCONTINUED | OUTPATIENT
Start: 2025-01-24 | End: 2025-01-26 | Stop reason: HOSPADM

## 2025-01-24 RX ORDER — ROSUVASTATIN CALCIUM 5 MG/1
5 TABLET, COATED ORAL DAILY
Status: DISCONTINUED | OUTPATIENT
Start: 2025-01-24 | End: 2025-01-26 | Stop reason: HOSPADM

## 2025-01-24 RX ADMIN — FINASTERIDE 5 MG: 5 TABLET, FILM COATED ORAL at 11:40

## 2025-01-24 RX ADMIN — ROSUVASTATIN CALCIUM 5 MG: 5 TABLET, FILM COATED ORAL at 11:40

## 2025-01-24 RX ADMIN — ALPRAZOLAM 0.5 MG: 0.5 TABLET ORAL at 22:40

## 2025-01-24 RX ADMIN — OSELTAMIVIR PHOSPHATE 75 MG: 75 CAPSULE ORAL at 20:51

## 2025-01-24 RX ADMIN — OSELTAMIVIR PHOSPHATE 75 MG: 75 CAPSULE ORAL at 08:42

## 2025-01-24 RX ADMIN — ACETAMINOPHEN 650 MG: 325 TABLET, FILM COATED ORAL at 03:45

## 2025-01-24 RX ADMIN — TAMSULOSIN HYDROCHLORIDE 0.4 MG: 0.4 CAPSULE ORAL at 11:40

## 2025-01-24 RX ADMIN — LISINOPRIL 20 MG: 20 TABLET ORAL at 08:44

## 2025-01-24 RX ADMIN — CARBIDOPA AND LEVODOPA 1 TABLET: 25; 100 TABLET, EXTENDED RELEASE ORAL at 20:51

## 2025-01-24 RX ADMIN — B-COMPLEX W/ C & FOLIC ACID TAB 1 TABLET: TAB at 11:40

## 2025-01-24 RX ADMIN — BENZOCAINE AND MENTHOL 1 EACH: 15; 3.6 LOZENGE ORAL at 03:46

## 2025-01-24 RX ADMIN — CARBIDOPA AND LEVODOPA 1 TABLET: 25; 100 TABLET, EXTENDED RELEASE ORAL at 08:44

## 2025-01-24 RX ADMIN — APIXABAN 5 MG: 5 TABLET, FILM COATED ORAL at 20:51

## 2025-01-24 RX ADMIN — ACETAMINOPHEN 650 MG: 325 TABLET, FILM COATED ORAL at 16:12

## 2025-01-24 RX ADMIN — PANTOPRAZOLE SODIUM 40 MG: 40 TABLET, DELAYED RELEASE ORAL at 05:53

## 2025-01-24 RX ADMIN — ESCITALOPRAM OXALATE 10 MG: 10 TABLET ORAL at 08:43

## 2025-01-24 RX ADMIN — Medication 10 ML: at 20:51

## 2025-01-24 RX ADMIN — HYDROCHLOROTHIAZIDE 12.5 MG: 12.5 CAPSULE ORAL at 08:44

## 2025-01-24 RX ADMIN — BENZOCAINE AND MENTHOL 1 EACH: 15; 3.6 LOZENGE ORAL at 05:54

## 2025-01-24 RX ADMIN — APIXABAN 5 MG: 5 TABLET, FILM COATED ORAL at 08:43

## 2025-01-24 RX ADMIN — CARIPRAZINE 1.5 MG: 1.5 CAPSULE, GELATIN COATED ORAL at 08:42

## 2025-01-24 RX ADMIN — ACETAMINOPHEN 650 MG: 325 TABLET, FILM COATED ORAL at 20:51

## 2025-01-24 RX ADMIN — Medication 10 ML: at 08:44

## 2025-01-24 RX ADMIN — ASPIRIN 81 MG CHEWABLE TABLET 81 MG: 81 TABLET CHEWABLE at 08:42

## 2025-01-24 NOTE — THERAPY EVALUATION
Patient Name: Tristan Hillman  : 1945    MRN: [id number]                              Today's Date: 2025       Admit Date: 2025    Visit Dx:     ICD-10-CM ICD-9-CM   1. Influenza A  J10.1 487.1   2. Generalized weakness  R53.1 780.79     Patient Active Problem List   Diagnosis    Arthralgia of multiple joints    Generalized anxiety disorder    Gastroesophageal reflux disease without esophagitis    Dyslipidemia    Primary hypertension    Insomnia    Bradycardia    Chronic left-sided low back pain without sciatica    Idiopathic peripheral neuropathy    Coronary artery disease involving native coronary artery without angina pectoris    Chronic tension-type headache, not intractable    Adhesive capsulitis of right shoulder    Rotator cuff injury, right, initial encounter    Near syncope    Paroxysmal atrial fibrillation    Weakness of both lower extremities    Chronic right hip pain    Atherosclerosis of native arteries of extremities with intermittent claudication, bilateral legs    Atrial flutter with rapid ventricular response    Influenza A     Past Medical History:   Diagnosis Date    Ankle sprain     Anxiety     Arthritis of back     Arthritis of neck     Bursitis of hip     Coronary artery disease     GERD (gastroesophageal reflux disease)     Hip arthrosis     History of insomnia     Hyperlipidemia     Hypertension     Low back pain     Low back strain     Neck strain     Periarthritis of shoulder     Rotator cuff syndrome     Tennis elbow      Past Surgical History:   Procedure Laterality Date    CORONARY STENT PLACEMENT        General Information       Row Name 25 0936          Physical Therapy Time and Intention    Document Type evaluation  -     Mode of Treatment physical therapy  -       Row Name 25 0936          General Information    Patient Profile Reviewed yes  -MK     Prior Level of Function independent:;ADL's;all household mobility  -MK     Existing  Precautions/Restrictions fall  -     Barriers to Rehab medically complex;previous functional deficit;hearing deficit  -[facility] Name 01/24/25 0936          Living Environment    People in Home spouse  -[facility] Name 01/24/25 0936          Home Main Entrance    Number of Stairs, Main Entrance other (see comments)  ramp to enter with threshold to go into home  -[facility] Name 01/24/25 0936          Stairs Within Home, Primary    Number of Stairs, Within Home, Primary none  -[facility] Name 01/24/25 0936          Cognition    Orientation Status (Cognition) oriented x 4;verbal cues/prompts needed for orientation  -[facility] Name 01/24/25 0936          Safety Issues/Impairments Affecting Functional Mobility    Safety Issues Affecting Function (Mobility) awareness of need for assistance;insight into deficits/self-awareness;positioning of assistive device;safety precaution awareness;safety precautions follow-through/compliance  -     Impairments Affecting Function (Mobility) balance;coordination;endurance/activity tolerance;motor planning;strength  -               User Key  (r) = Recorded By, (t) = Taken By, (c) = Cosigned By      Initials Name Provider Type     Niraj Arriaga, PT Physical Therapist                   Mobility       Row Name 01/24/25 0936          Sit-Stand Transfer    Sit-Stand [place] (Transfers) minimum assist (75% patient effort)  -     Assistive Device (Sit-Stand Transfers) walker, front-wheeled  -[facility] Name 01/24/25 0936          Gait/Stairs (Locomotion)    [place] Level (Gait) minimum assist (75% patient effort);verbal cues  -     Assistive Device (Gait) walker, front-wheeled  -     Patient was able to Ambulate yes  -     Distance in Feet (Gait) 20  -MK     Deviations/Abnormal Patterns (Gait) meghan decreased;festinating/shuffling;gait speed decreased;base of support, narrow  -     Bilateral Gait Deviations forward flexed posture  -                User Key  (r) = Recorded By, (t) = Taken By, (c) = Cosigned By      Initials Name Provider Type    Niraj Roberts PT Physical Therapist                   Obj/Interventions       Row Name 01/24/25 0936          Range of Motion Comprehensive    General Range of Motion no range of motion deficits identified  -MK       Row Name 01/24/25 0936          Strength Comprehensive (MMT)    General Manual Muscle Testing (MMT) Assessment lower extremity strength deficits identified  -     Comment, General Manual Muscle Testing (MMT) Assessment B LE grossly 4/5  -MK       Row Name 01/24/25 0936          Balance    Balance Assessment standing static balance;standing dynamic balance  -     Static Standing Balance minimal assist  -     Dynamic Standing Balance minimal assist  -     Position/Device Used, Standing Balance walker, front-wheeled  -     Balance Interventions standing;sit to stand  -               User Key  (r) = Recorded By, (t) = Taken By, (c) = Cosigned By      Initials Name Provider Type    Niraj Roberts PT Physical Therapist                   Goals/Plan       Row Name 01/24/25 1012          Bed Mobility Goal 1 (PT)    Activity/Assistive Device (Bed Mobility Goal 1, PT) bed mobility activities, all  -     [place] Level/Cues Needed (Bed Mobility Goal 1, PT) independent  -     Time Frame (Bed Mobility Goal 1, PT) short term goal (STG);5 days  -     Progress/Outcomes (Bed Mobility Goal 1, PT) new goal  -MK       Row Name 01/24/25 1012          Transfer Goal 1 (PT)    Activity/Assistive Device (Transfer Goal 1, PT) sit-to-stand/stand-to-sit  -     [place] Level/Cues Needed (Transfer Goal 1, PT) contact guard required  -     Time Frame (Transfer Goal 1, PT) long term goal (LTG);10 days  -     Progress/Outcome (Transfer Goal 1, PT) new goal  -MK       Row Name 01/24/25 1012          Gait Training Goal 1 (PT)    Activity/Assistive Device (Gait Training Goal 1, PT) gait (walking  locomotion)  -     [place] Level (Gait Training Goal 1, PT) contact guard required  -MK     Distance (Gait Training Goal 1, PT) 50 ft  -MK     Time Frame (Gait Training Goal 1, PT) long term goal (LTG);10 days  -MK     Progress/Outcome (Gait Training Goal 1, PT) new goal  -       Row Name 01/24/25 1012          Patient Education Goal (PT)    Activity (Patient Education Goal, PT) Pt to participate in B LE ther ex at least 3x per week  -     [place]/Cues/Accuracy (Memory Goal 2, PT) demonstrates adequately  -MK     Time Frame (Patient Education Goal, PT) long term goal (LTG);10 days  -MK     Progress/Outcome (Patient Education Goal, PT) new goal  -       Row Name 01/24/25 1012          Therapy Assessment/Plan (PT)    Planned Therapy Interventions (PT) balance training;bed mobility training;gait training;home exercise program;motor coordination training;neuromuscular re-education;patient/family education;strengthening;transfer training  -               User Key  (r) = Recorded By, (t) = Taken By, (c) = Cosigned By      Initials Name Provider Type    Niraj Roberts, PT Physical Therapist                   Clinical Impression       Row Name 01/24/25 0936          Pain    Pretreatment Pain Rating 3/10  -MK     Posttreatment Pain Rating 3/10  -MK     Pain Location buttock  -     Pain Management Interventions activity modification encouraged;exercise or physical activity utilized  -     Response to Pain Interventions activity level improved;activity participation with decreased pain  -       Row Name 01/24/25 0936          Plan of Care Review    Plan of Care Reviewed With patient;child  -     Progress no change  -     Outcome Evaluation Pt participated well in PT evaluation this date.  Pt lives with his wife with a ramp to enter, threshold to enter home. His daughter lives close. Prior to admission, pt was IND with ADLs up until Wednesday. Pt uses a cane and RW at baseline. Daughter reports  "that he has \"good and bad days\". Pt has a BSC and SC at home. Pt sitting on bedside commode and was able to complete STS min A using RW. Pt ambulated 20 ft to bedside chair using RW with min A and cues. Pt demonstrates festinating gait, frequent freezing and needed cues to take bigger steps/ stay close to RW. pt was seated and was left with all needs met, daughter at bedside. Pt would like to do HH opposed to STR. Pt is expected to benefit from skilled PT during this inpatient stay to maximize functional mobility and IND. Pt would further benefit from HH upon dc  -       Row Name 01/24/25 0936          Therapy Assessment/Plan (PT)    Patient/Family Therapy Goals Statement (PT) pt would like to go home with HH, daughter agrees  -     Rehab Potential (PT) good  -     Criteria for Skilled Interventions Met (PT) yes;skilled treatment is necessary  -     Therapy Frequency (PT) 5 times/wk  -     Predicted Duration of Therapy Intervention (PT) 2 weeks  -       Row Name 01/24/25 0936          Vital Signs    O2 Delivery Pre Treatment room air  -     O2 Delivery Intra Treatment room air  -     O2 Delivery Post Treatment room air  -     Pre Patient Position Sitting  -     Intra Patient Position Standing  -     Post Patient Position Sitting  -       Row Name 01/24/25 0936          Positioning and Restraints    Pre-Treatment Position bedside commode  -     Post Treatment Position chair  -     In Chair reclined;call light within reach;encouraged to call for assist;exit alarm on;with family/caregiver  -               User Key  (r) = Recorded By, (t) = Taken By, (c) = Cosigned By      Initials Name Provider Type    Niraj Roberts, PT Physical Therapist                   Outcome Measures       Row Name 01/24/25 1013 01/24/25 0844       How much help from another person do you currently need...    Turning from your back to your side while in flat bed without using bedrails? 3  -MK 3  -HS    Moving from " lying on back to sitting on the side of a flat bed without bedrails? 3  -MK 3  -HS    Moving to and from a bed to a chair (including a wheelchair)? 3  -MK 2  -HS    Standing up from a chair using your arms (e.g., wheelchair, bedside chair)? 3  -MK 2  -HS    Climbing 3-5 steps with a railing? 2  -MK 1  -HS    To walk in hospital room? 3  -MK 1  -HS    AM-PAC 6 Clicks Score (PT) 17  -MK 12  -HS    Highest Level of Mobility Goal 5 --> Static standing  - 4 --> Transfer to chair/commode  -HS      Row Name 01/23/25 2312          How much help from another person do you currently need...    Turning from your back to your side while in flat bed without using bedrails? 3  -CH     Moving from lying on back to sitting on the side of a flat bed without bedrails? 3  -CH     Moving to and from a bed to a chair (including a wheelchair)? 2  -CH     Standing up from a chair using your arms (e.g., wheelchair, bedside chair)? 2  -CH     Climbing 3-5 steps with a railing? 1  -CH     To walk in hospital room? 1  -CH     AM-PAC 6 Clicks Score (PT) 12  -CH     Highest Level of Mobility Goal 4 --> Transfer to chair/commode  -CH       Row Name 01/24/25 1013 01/24/25 0937       Functional Assessment    Outcome Measure Options AM-PAC 6 Clicks Basic Mobility (PT)  -[facility]-PAC 6 Clicks Daily Activity (OT)  -              User Key  (r) = Recorded By, (t) = Taken By, (c) = Cosigned By      Initials Name Provider Type    Lalitha Garrett Occupational Therapist     Lynne Guzman RN Registered Nurse     Kelsey Almonte, RN Registered Nurse    Niraj Roberts, JOEL Physical Therapist                                 Physical Therapy Education       Title: PT OT SLP Therapies (In Progress)       Topic: Physical Therapy (In Progress)       Point: Mobility training (Done)       Learning Progress Summary            Patient Acceptance, E,D, DU,VU by  at 1/24/2025 1014                      Point: Home exercise program (Not Started)        "Learner Progress:  Not documented in this visit.              Point: Body mechanics (Done)       Learning Progress Summary            Patient Acceptance, E,D, DU,VU by  at 1/24/2025 1014                      Point: Precautions (Not Started)       Learner Progress:  Not documented in this visit.                              User Key       Initials Effective Dates Name Provider Type Discipline     06/13/23 -  Niraj Arriaga, PT Physical Therapist PT                  PT Recommendation and Plan  Planned Therapy Interventions (PT): balance training, bed mobility training, gait training, home exercise program, motor coordination training, neuromuscular re-education, patient/family education, strengthening, transfer training  Progress: no change  Outcome Evaluation: Pt participated well in PT evaluation this date.  Pt lives with his wife with a ramp to enter, threshold to enter home. His daughter lives close. Prior to admission, pt was IND with ADLs up until Wednesday. Pt uses a cane and RW at baseline. Daughter reports that he has \"good and bad days\". Pt has a BSC and SC at home. Pt sitting on bedside commode and was able to complete STS min A using RW. Pt ambulated 20 ft to bedside chair using RW with min A and cues. Pt demonstrates festinating gait, frequent freezing and needed cues to take bigger steps/ stay close to RW. pt was seated and was left with all needs met, daughter at bedside. Pt would like to do HH opposed to STR. Pt is expected to benefit from skilled PT during this inpatient stay to maximize functional mobility and IND. Pt would further benefit from HH upon dc     Time Calculation:   PT Evaluation Complexity  History, PT Evaluation Complexity: 1-2 personal factors and/or comorbidities  Examination of Body Systems (PT Eval Complexity): total of 3 or more elements  Clinical Presentation (PT Evaluation Complexity): evolving  Clinical Decision Making (PT Evaluation Complexity): moderate complexity  Overall " Complexity (PT Evaluation Complexity): moderate complexity     PT Charges       Row Name 01/24/25 0936             Time Calculation    Start Time 0936  -MK      PT Received On 01/24/25  -MK      PT Goal Re-Cert Due Date 02/03/25  -MK         Untimed Charges    PT Eval/Re-eval Minutes 45  -MK         Total Minutes    Untimed Charges Total Minutes 45  -MK       Total Minutes 45  -MK                User Key  (r) = Recorded By, (t) = Taken By, (c) = Cosigned By      Initials Name Provider Type    Niraj Roberts, PT Physical Therapist                  Therapy Charges for Today       Code Description Service Date Service Provider Modifiers Qty    42864206411 HC PT EVAL MOD COMPLEXITY 3 1/24/2025 Niraj Arriaga, PT GP 1            PT G-Codes  Outcome Measure Options: AM-PAC 6 Clicks Basic Mobility (PT)  AM-PAC 6 Clicks Score (PT): 17  AM-PAC 6 Clicks Score (OT): 19  PT Discharge Summary  Anticipated Discharge Disposition (PT): home with home health    Niraj Arriaga PT  1/24/2025

## 2025-01-24 NOTE — CASE MANAGEMENT/SOCIAL WORK
Case Management/Social Work    Patient Name:  Tristan Hillman  YOB: 1945  MRN: [id number]  Admit Date:  1/23/2025        09:19 EST  Met with patient at bedside. We discussed STR which patient is declining at this time. States he'd prefer to discharge home with home health. No preference for company. CM will continue to follow.    13:53 EST  Home health faxed to [facility] health, they're currently closed. Hospital bed called in to [facility].        Electronically signed by:  Moo Rubin RN  01/24/25 09:19 EST

## 2025-01-24 NOTE — PLAN OF CARE
Problem: Adult Inpatient Plan of Care  Goal: Plan of Care Review  Outcome: Progressing  Goal: Patient-Specific Goal (Individualized)  Outcome: Progressing  Goal: Absence of Hospital-Acquired Illness or Injury  Outcome: Progressing  Intervention: Identify and Manage Fall Risk  Recent Flowsheet Documentation  Taken 1/23/2025 2330 by Lynne Guzman RN  Safety Promotion/Fall Prevention:   activity supervised   assistive device/personal items within reach   clutter free environment maintained   safety round/check completed  Intervention: Prevent Skin Injury  Recent Flowsheet Documentation  Taken 1/23/2025 2330 by Lynne Guzman RN  Body Position: sitting up in bed  Skin Protection: incontinence pads utilized  Intervention: Prevent Infection  Recent Flowsheet Documentation  Taken 1/23/2025 2330 by Lynne Guzman RN  Infection Prevention: environmental surveillance performed  Goal: Optimal Comfort and Wellbeing  Outcome: Progressing  Intervention: Monitor Pain and Promote Comfort  Recent Flowsheet Documentation  Taken 1/23/2025 2330 by Lynne Guzman RN  Pain Management Interventions:   pillow support provided   position adjusted  Intervention: Provide Person-Centered Care  Recent Flowsheet Documentation  Taken 1/23/2025 2330 by Lynne Guzman RN  Trust Relationship/Rapport:   care explained   choices provided   emotional support provided  Goal: Readiness for Transition of Care  Outcome: Progressing  Intervention: Mutually Develop Transition Plan  Recent Flowsheet Documentation  Taken 1/23/2025 2315 by Lynne Guzman RN  Transportation Anticipated: family or friend will provide  Transportation Concerns: none  Patient/Family Anticipated Services at Transition: none  Patient/Family Anticipates Transition to: home with family  Taken 1/23/2025 2310 by Lynne Guzman RN  Equipment Currently Used at Home:   cane, quad tip   shower chair   walker, standard     Problem: Skin Injury Risk  Increased  Goal: Skin Health and Integrity  Outcome: Progressing  Intervention: Optimize Skin Protection  Recent Flowsheet Documentation  Taken 1/23/2025 2330 by Lynne Guzman RN  Activity Management: activity encouraged  Pressure Reduction Techniques: frequent weight shift encouraged  Head of Bed (HOB) Positioning: HOB elevated  Pressure Reduction Devices: pressure-redistributing mattress utilized  Skin Protection: incontinence pads utilized     Problem: Fatigue  Goal: Improved Activity Tolerance  Outcome: Progressing  Intervention: Promote Improved Energy  Recent Flowsheet Documentation  Taken 1/23/2025 2330 by Lynne Guzman RN  Activity Management: activity encouraged     Problem: Hypertension Acute  Goal: Blood Pressure Within Desired Range  Outcome: Progressing  Intervention: Normalize Blood Pressure  Recent Flowsheet Documentation  Taken 1/23/2025 2330 by Lynne Guzman RN  Medication Review/Management: medications reviewed   Goal Outcome Evaluation:         Plan of care reviewed with patient and daughter at bedside. No acute events noted this shift.

## 2025-01-24 NOTE — PLAN OF CARE
"Goal Outcome Evaluation:  Plan of Care Reviewed With: patient, child        Progress: no change  Outcome Evaluation: Pt participated well in PT evaluation this date.  Pt lives with his wife with a ramp to enter, threshold to enter home. His daughter lives close. Prior to admission, pt was IND with ADLs up until Wednesday. Pt uses a cane and RW at baseline. Daughter reports that he has \"good and bad days\". Pt has a BSC and SC at home. Pt sitting on bedside commode and was able to complete STS min A using RW. Pt ambulated 20 ft to bedside chair using RW with min A and cues. Pt demonstrates festinating gait, frequent freezing and needed cues to take bigger steps/ stay close to RW. pt was seated and was left with all needs met, daughter at bedside. Pt would like to do HH opposed to STR. Pt is expected to benefit from skilled PT during this inpatient stay to maximize functional mobility and IND. Pt would further benefit from HH upon dc    Anticipated Discharge Disposition (PT): home with home health                        "

## 2025-01-24 NOTE — PAYOR COMM NOTE
"TO:HUMANA  FROM:PAZ TAMAYO RN PHONE [phone]  OBSERVATION NOTIFICATION  TAX ID [id number] [facility] [phone]    Rupali Hillman (79 y.o. Male)       Date of Birth   1945    Social Security Number       Address   [address] [place] [id number]    Home Phone   [phone]    MRN   [id number]       Confucianist   Restoration    Marital Status                               Admission Date   25    Admission Type   Emergency    Admitting Provider   Niraj Colon DO    Attending Provider   Driss Del Rosario MD    [facility]       Discharge Date       Discharge Disposition       Discharge Destination                                 Attending Provider: Driss Del Rosario MD    Allergies: No Known Allergies    Isolation: Droplet   Infection: Influenza (25)   Code Status: CPR    Ht: 180.3 cm (71\")   Wt: 98.4 kg (216 lb 14.9 oz)    Admission Cmt: None   Principal Problem: Influenza A [J10.1]                   Active Insurance as of 2025       Primary Coverage       Payor Plan Insurance Group Employer/Plan Group    HUMANA MEDICARE REPLACEMENT HU[employer]NA MED ADV GROUP [id number]       Payor Plan Address Payor Plan Phone Number Payor Plan Fax Number Effective Dates    [address] [address] [phone]  2025 - None Entered    [facility] [id number]         Subscriber Name Subscriber Birth Date Member ID       RUPALI HILLMAN 1945 [id number]                     Emergency Contacts        (Rel.) Home Phone Work Phone Mobile Phone    ARIANNA HILLMAN (Spouse) [phone] -- [phone]    StuAlayna Serna (Daughter) [phone] -- [phone]                 History & Physical        Ramírez Stout Jr., MD at [address] [place], [place] [address]IST HISTORY AND PHYSICAL  Date: 2025   Patient Name: Rupali Hillman  : 1945  MRN: [id number]  Primary Care Physician:  Kerri Chew APRN  Date of admission: " 1/23/2025    Subjective  Subjective     Chief Complaint: Generalized weakness    HPI:    Tristan Hillman is a 79 y.o. 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  Patient denies any other fevers, chills, sweats, nausea, vomiting, chest pain, shortness of breath, palpitations, abdominal pain diarrhea constipation, dysuria, rash.  Found to be insulin today positive.  Was started on Tamiflu and will be admitted for ongoing monitoring and management.      Personal History     Past Medical History:  Past Medical History:   Diagnosis Date    Ankle sprain     Anxiety     Arthritis of back     Arthritis of neck     Bursitis of hip     Coronary artery disease     GERD (gastroesophageal reflux disease)     Hip arthrosis     History of insomnia     Hyperlipidemia     Hypertension     Low back pain     Low back strain     Neck strain     Periarthritis of shoulder     Rotator cuff syndrome     Tennis elbow          Past Surgical History:  Past Surgical History:   Procedure Laterality Date    CORONARY STENT PLACEMENT           Family History:   Family History   Problem Relation Age of Onset    Heart disease Mother     Hypertension Mother     Heart attack Mother     Heart attack Father     Hyperlipidemia Sister     Cancer Sister     Diabetes Sister     Hypertension Sister     Heart attack Brother    ,    Social History:   Social History     Tobacco Use    Smoking status: Never     Passive exposure: Never    Smokeless tobacco: Current    Tobacco comments:     Chews on cigars   Vaping Use    Vaping status: Never Used   Substance Use Topics    Alcohol use: Not Currently    Drug use: No         Home Medications:  ALPRAZolam, Cariprazine HCl, apixaban, aspirin, carbidopa-levodopa ER, coenzyme Q10, escitalopram, lisinopril-hydrochlorothiazide, nitroglycerin, nystatin, omeprazole, and  rosuvastatin    Allergies:  No Known Allergies    Review of Systems   All systems were reviewed and negative except for: Generalized weakness    Objective  Objective     Vitals:   Temp:  [98.6 °F (37 °C)] 98.6 °F (37 °C)  Heart Rate:  [74-92] 82  Resp:  [20] 20  BP: (104-149)/(64-90) 149/86    Physical Exam    Constitutional: Awake, alert, no acute distress   Eyes: Pupils equal, sclerae anicteric, no conjunctival injection   HENT: NCAT, mucous membranes moist   Neck: Supple, no thyromegaly, no lymphadenopathy, trachea midline   Respiratory: Clear to auscultation bilaterally, nonlabored respirations    Cardiovascular: RRR, no murmurs, rubs, or gallops, palpable pedal pulses bilaterally   Gastrointestinal: Positive bowel sounds, soft, nontender, nondistended   Musculoskeletal: No bilateral ankle edema, no clubbing or cyanosis to extremities   Psychiatric: Appropriate affect, cooperative   Neurologic: Oriented x 3, strength symmetric in all extremities, Cranial Nerves grossly intact to confrontation, speech clear   Skin: No rashes     Result Review   Result Review:  I have personally reviewed the results from the time of this admission to 1/23/2025 13:28 EST and agree with these findings:  [x]  Laboratory  []  Microbiology  [x]  Radiology  []  EKG/Telemetry   []  Cardiology/Vascular   []  Pathology  []  Old records  []  Other:      Assessment & Plan  Assessment / Plan     Assessment/Plan:   Influenza A: Supportive care.  Tamiflu  Generalized weakness/movement disorder: Continue Sinemet.  Supportive care.  PT to evaluate  Hypertension: Add recommendations and titrate as needed  Paroxysmal SVT/A-fib: Per outpatient cardiology note.  Continue Eliquis and other home regimen.  Addendum: Patient retaining urine in the emergency department.  Will place Scott tonight.    VTE Prophylaxis:  Pharmacologic VTE prophylaxis orders are present.        CODE STATUS:    Code Status (Patient has no pulse and is not breathing): CPR  "(Attempt to Resuscitate)  Medical Interventions (Patient has pulse or is breathing): Full Support      Admission Status:  I believe this patient meets observation status.    Electronically signed by Ramírez Stout Jr, MD, 25, 1:28 PM EST.             Electronically signed by Ramírez Stout Jr., MD at 25 1638          Emergency Department Notes        Dion Kraft Paramedic at 25 1228          Attempted ambulation of the patient. His words \"How am I supposed to walk around the room when I can't even get up\"    Electronically signed by Dion Kraft Paramedic at 25 1228       Francisco Whitmore DO at 25 0938                 [facility]  Emergency Department Encounter  Emergency Medicine Physician Note       Pt Name: Tristan Hillman  MRN: [id number]  Pt :   1945  Room Number:    Date of encounter:  2025  PCP: Kerri Chew APRN  ED Physician: Francisco Whitmore DO    HPI:  Tristan Hillman is a 79 y.o. male who presents to the ED with chief complaint of GENERALIZED WEAKNESS.  Patient reports he has been sick for the past 4 days.  States that he is weak to the point that he is unable to even get out of bed.  Reports associated shortness of breath and cough.  Cough is nonproductive.  Duration constant.  No modifying factors.    Pertinent past medical history: Anxiety/insomnia, hypertension, CHF, paroxysmal A-fib.  Patient is currently being worked up for movement disorder with suspected Parkinson disease.  Patient is currently on Sinemet.  Walks with a walker at baseline.    Patient's daughter is at bedside contributes to HPI.    PAST MEDICAL HISTORY  Past Medical History:   Diagnosis Date    Ankle sprain     Anxiety     Arthritis of back     Arthritis of neck     Bursitis of hip     Coronary artery disease     GERD (gastroesophageal reflux disease)     Hip arthrosis     History of insomnia     Hyperlipidemia     Hypertension     Low back pain     Low back " strain     Neck strain     Periarthritis of shoulder     Rotator cuff syndrome     Tennis elbow      Current Outpatient Medications   Medication Instructions    ALPRAZolam (XANAX) 0.5 MG tablet TAKE ONE (1) TABLET BY MOUTH EVERY NIGHT AS NEEDED FOR SLEEP/ANXIETY    aspirin 81 mg, Oral, Daily    carbidopa-levodopa ER (SINEMET CR)  MG per tablet 1 tablet, Oral, 2 Times Daily    Cariprazine HCl (VRAYLAR) 1.5 mg, Oral, Daily    coenzyme Q10 100 mg, Daily    Eliquis 5 mg, Oral, 2 Times Daily    escitalopram (LEXAPRO) 10 mg, Oral, Daily    lisinopril-hydrochlorothiazide (Zestoretic) 20-12.5 MG per tablet 1 tablet, Oral, Daily    nitroglycerin (NITROSTAT) 0.4 MG SL tablet place 1 tablet under the tongue if needed every 5 minutes fo    nystatin (MYCOSTATIN) 462582 UNIT/GM powder Topical, 3 Times Daily    omeprazole (priLOSEC) 20 MG capsule TAKE 1 CAPSULE BY MOUTH DAILY - MUST HAVE APPOINTMENT FOR FURTHER REFILLS    rosuvastatin (CRESTOR) 5 mg, Oral, Daily      PAST SURGICAL HISTORY  Past Surgical History:   Procedure Laterality Date    CORONARY STENT PLACEMENT         FAMILY HISTORY  Family History   Problem Relation Age of Onset    Heart disease Mother     Hypertension Mother     Heart attack Mother     Heart attack Father     Hyperlipidemia Sister     Cancer Sister     Diabetes Sister     Hypertension Sister     Heart attack Brother        SOCIAL HISTORY  Social History     Socioeconomic History    Marital status:    Tobacco Use    Smoking status: Never     Passive exposure: Never    Smokeless tobacco: Current    Tobacco comments:     Chews on cigars   Vaping Use    Vaping status: Never Used   Substance and Sexual Activity    Alcohol use: Not Currently    Drug use: No    Sexual activity: Not Currently     Partners: Female     ALLERGIES  Patient has no known allergies.    REVIEW OF SYSTEMS  All systems reviewed and negative except for those discussed in HPI.     PHYSICAL EXAM  ED Triage Vitals [01/23/25 0918]    Temp Heart Rate Resp BP SpO2   98.6 °F (37 °C) 74 20 138/76 93 %      Temp src Heart Rate Source Patient Position BP Location FiO2 (%)   -- -- -- -- --     I have reviewed the triage vital signs and nursing notes.    General: Alert.  Appears chronically ill, but nontoxic.  No acute distress.  Head: Normocephalic.  Atraumatic.  Eyes: No scleral icterus.  ENT: Moist mucous membranes.  Cardiovascular: Regular rate and rhythm.  No murmurs.  No rubs.  2+ distal pulses bilaterally.  Respiratory: Equal breath sounds bilaterally. No wheezing. No rales.  No rhonchi.  GI: Abdomen is soft.  Nondistended.  Nontender to palpation.  No rebound.  No guarding.  No CVA tenderness.  MSK: Moves all 4 extremities.  No muscle atrophy.  Neurologic: Oriented x 3.  No focal deficits.  Skin: No edema. No erythema. No pallor. No cyanosis.  Psych: Normal mood and affect.    LAB RESULTS  Recent Results (from the past 24 hours)   Comprehensive Metabolic Panel    Collection Time: 01/23/25  9:20 AM    Specimen: Blood   Result Value Ref Range    Glucose 140 (H) 65 - 99 mg/dL    BUN 16 8 - 23 mg/dL    Creatinine 0.96 0.76 - 1.27 mg/dL    Sodium 140 136 - 145 mmol/L    Potassium 3.9 3.5 - 5.2 mmol/L    Chloride 101 98 - 107 mmol/L    CO2 24.6 22.0 - 29.0 mmol/L    Calcium 9.2 8.6 - 10.5 mg/dL    Total Protein 6.9 6.0 - 8.5 g/dL    Albumin 4.3 3.5 - 5.2 g/dL    ALT (SGPT) 19 1 - 41 U/L    AST (SGOT) 22 1 - 40 U/L    Alkaline Phosphatase 64 39 - 117 U/L    Total Bilirubin 0.4 0.0 - 1.2 mg/dL    Globulin 2.6 gm/dL    A/G Ratio 1.7 g/dL    BUN/Creatinine Ratio 16.7 7.0 - 25.0    Anion Gap 14.4 5.0 - 15.0 mmol/L    eGFR 81.4 >60.0 mL/min/1.73   High Sensitivity Troponin T    Collection Time: 01/23/25  9:20 AM    Specimen: Blood   Result Value Ref Range    HS Troponin T 14 <22 ng/L   Magnesium    Collection Time: 01/23/25  9:20 AM    Specimen: Blood   Result Value Ref Range    Magnesium 1.6 1.6 - 2.4 mg/dL   Green Top (Gel)    Collection Time: 01/23/25   9:20 AM   Result Value Ref Range    Extra Tube Hold for add-ons.    Lavender Top    Collection Time: 01/23/25  9:20 AM   Result Value Ref Range    Extra Tube hold for add-on    Gold Top - SST    Collection Time: 01/23/25  9:20 AM   Result Value Ref Range    Extra Tube Hold for add-ons.    Light Blue Top    Collection Time: 01/23/25  9:20 AM   Result Value Ref Range    Extra Tube Hold for add-ons.    CBC Auto Differential    Collection Time: 01/23/25  9:20 AM    Specimen: Blood   Result Value Ref Range    WBC 7.55 3.40 - 10.80 10*3/mm3    RBC 4.41 4.14 - 5.80 10*6/mm3    Hemoglobin 13.9 13.0 - 17.7 g/dL    Hematocrit 39.7 37.5 - 51.0 %    MCV 90.0 79.0 - 97.0 fL    MCH 31.5 26.6 - 33.0 pg    MCHC 35.0 31.5 - 35.7 g/dL    RDW 12.0 (L) 12.3 - 15.4 %    RDW-SD 39.8 37.0 - 54.0 fl    MPV 11.1 6.0 - 12.0 fL    Platelets 146 140 - 450 10*3/mm3    Neutrophil % 78.1 (H) 42.7 - 76.0 %    Lymphocyte % 7.7 (L) 19.6 - 45.3 %    Monocyte % 12.7 (H) 5.0 - 12.0 %    Eosinophil % 0.3 0.3 - 6.2 %    Basophil % 0.9 0.0 - 1.5 %    Immature Grans % 0.3 0.0 - 0.5 %    Neutrophils, Absolute 5.90 1.70 - 7.00 10*3/mm3    Lymphocytes, Absolute 0.58 (L) 0.70 - 3.10 10*3/mm3    Monocytes, Absolute 0.96 (H) 0.10 - 0.90 10*3/mm3    Eosinophils, Absolute 0.02 0.00 - 0.40 10*3/mm3    Basophils, Absolute 0.07 0.00 - 0.20 10*3/mm3    Immature Grans, Absolute 0.02 0.00 - 0.05 10*3/mm3    nRBC 0.0 0.0 - 0.2 /100 WBC   COVID-19 and FLU A/B PCR, 1 HR TAT - Swab, Nasopharynx    Collection Time: 01/23/25  9:20 AM    Specimen: Nasopharynx; Swab   Result Value Ref Range    COVID19 Not Detected Not Detected - Ref. Range    Influenza A PCR Detected (A) Not Detected    Influenza B PCR Not Detected Not Detected   High Sensitivity Troponin T 1Hr    Collection Time: 01/23/25 10:32 AM    Specimen: Blood   Result Value Ref Range    HS Troponin T 14 <22 ng/L    Troponin T Numeric Delta 0 Abnormal if >/=3 ng/L       RADIOLOGY  XR Chest 1 View    Result Date:  1/23/2025  PROCEDURE: XR CHEST 1 VW-  HISTORY: Weak/Dizzy/AMS triage protocol, coronary artery disease  COMPARISON:  None  FINDINGS:  Portable view of the chest demonstrates the lungs to be grossly clear. There is no evidence of effusion, pneumothorax or other significant pleural disease. The mediastinum is unremarkable.  The heart size is normal.      Unremarkable portable chest.    This report was signed and finalized on 1/23/2025 9:58 AM by Jf Murray MD.       PROCEDURES  Procedures    RISK STRATIFICATION    MEDICAL DECISION MAKING  79 y.o. male with past medical history listed above who presents with generalized weakness, shortness of breath and cough.    Patient arrives via EMS.  I have reviewed the EMS documentation/notes and included that information in my HPI.    Vital signs within normal limits.  Pulse ox 93% on room air.    Based on clinical presentation and physical exam, differential diagnosis includes, but is not limited to, viral URI/bronchitis, viral syndrome, pneumonia, metabolic derangement, dehydration, acute coronary syndrome, CHF.    At least 3 different tests have been ordered on this patient.    Medications administered in ED:  Medications   sodium chloride 0.9 % flush 10 mL (has no administration in time range)   oseltamivir (TAMIFLU) capsule 75 mg (has no administration in time range)   sodium chloride 0.9 % bolus 500 mL (0 mL Intravenous Stopped 1/23/25 1032)   ketorolac (TORADOL) injection 15 mg (15 mg Intravenous Given 1/23/25 1014)     Please see ED course below for my interpretation of the ED workup.  ED Course as of 01/23/25 1235   Thu Jan 23, 2025   0935 ECG 12 Lead ED Triage Standing Order; Weak / Dizzy / AMS  EKG per my interpretation normal sinus rhythm, rate 83, leftward axis, no ST segment elevation or depression, normal QRS QTC intervals.   [JS]   1008 XR Chest 1 View  I have independently reviewed and interpreted the chest x-ray.  My interpretation is negative for  infiltrate.    [JS]   1233 I reviewed the labs listed above. Clinically unremarkable.    Notable findings are highlighted below.    Old laboratory data was reviewed from the medical records and compared to today's results.   [JS]   1234 Influenza A PCR(!): Detected [JS]   1234 Glucose(!): 140 [JS]      ED Course User Index  [JS] Francisco Whitmore DO     On re-evaluation, patient resting comfortably.  Vital signs remained stable on room air.  Unfortunately, patient unable to pass ambulatory trial, therefore unable to safely discharge the patient. I did order empiric treatment with Tamiflu. Will proceed with medical admission for further workup and management.  I discussed the findings of the ED workup with the patient including my recommendation for admission.  Patient agreeable with plan and disposition.    Case discussed with Dr. Stout (hospitalist) who agrees to evaluate and admit the patient.  We discussed the HPI, pertinent PMHx, ED course and workup.    Chronic conditions affecting care: None    Social determinants of health impacting treatment or disposition: None    REPEAT VITAL SIGNS  AS OF 12:35 EST VITALS:  BP - 119/71  HR - 86  TEMP - 98.6 °F (37 °C)  O2 SATS - 93%    DIAGNOSIS  Final diagnoses:   Influenza A   Generalized weakness     DISPOSITION  ED Disposition       ED Disposition   Intended Admit    Condition   --    Comment   --             Please note that portions of this document were completed with voice recognition software.        Francisco Whitmore DO  01/23/25 1430      Electronically signed by Francisco Whitmore DO at 01/23/25 1430       Vital Signs (last day)       Date/Time Temp Temp src Pulse Resp BP Patient Position SpO2    01/24/25 0729 99.7 (37.6) Oral 69 20 147/65 Lying 94    01/24/25 0330 100.9 (38.3) Oral 79 18 153/66 Lying 94    01/23/25 2258 99.4 (37.4) Oral 84 20 157/72 Lying 94    01/23/25 2141 -- -- 72 -- 169/95 -- --    01/23/25 2111 -- -- -- -- -- -- 95    01/23/25 2058 -- -- 85  -- 164/96 -- 94    01/23/25 2038 -- -- 86 -- 141/77 -- 93 01/23/25 2020 -- -- 82 -- 146/77 -- 93    01/23/25 1958 -- -- 92 -- 155/93 -- --    01/23/25 1938 -- -- 89 -- 146/67 -- 91    01/23/25 1918 -- -- 95 -- 154/84 -- 96    01/23/25 1901 -- -- 92 -- 164/85 -- 95    01/23/25 1821 -- -- 89 -- 176/96 -- 94    01/23/25 1800 -- -- 88 -- 160/91 -- --    01/23/25 1740 -- -- 86 -- 168/100 -- --    01/23/25 1700 -- -- 69 -- 150/96 -- 96    01/23/25 1640 -- -- 78 -- 146/91 -- 94    01/23/25 1620 -- -- 79 -- 150/82 -- 94    01/23/25 1600 -- -- 75 -- 144/100 -- 95    01/23/25 1540 -- -- 60 -- 125/76 -- 93    01/23/25 1520 -- -- 68 -- 119/74 -- 93    01/23/25 1500 -- -- 68 -- 124/68 -- 93 01/23/25 1440 -- -- 79 -- 125/67 -- 92    01/23/25 1420 -- -- 79 -- 130/88 -- 93    01/23/25 1415 -- -- 86 -- 137/82 -- 94    01/23/25 1340 -- -- 87 -- 138/72 -- --    01/23/25 1320 -- -- 86 -- 136/76 -- --    01/23/25 1308 -- -- -- -- 149/86 -- --    01/23/25 1305 -- -- 90 -- 149/86 -- 95    01/23/25 1238 -- -- 82 -- 124/81 -- 94    01/23/25 1218 -- -- 80 -- 116/70 -- 94    01/23/25 1158 -- -- 77 -- 134/90 -- 93    01/23/25 1138 -- -- 87 -- 131/79 -- 92    01/23/25 1121 -- -- 86 -- 119/71 -- 93    01/23/25 1101 -- -- 92 -- 104/72 -- 94    01/23/25 1041 -- -- 86 -- 128/73 -- 93    01/23/25 1018 -- -- 75 -- 128/70 -- 95    01/23/25 0958 -- -- 92 -- 126/68 -- 93    01/23/25 0938 -- -- 76 -- 125/70 -- --    01/23/25 0923 -- -- 75 -- 132/64 -- 93    01/23/25 0918 98.6 (37) -- 74 20 138/76 -- 93          Current Facility-Administered Medications   Medication Dose Route Frequency Provider Last Rate Last Admin    acetaminophen (TYLENOL) tablet 650 mg  650 mg Oral Q4H PRN Ramírez Stout Jr., MD   650 mg at 01/24/25 0345    Or    acetaminophen (TYLENOL) 160 MG/5ML oral solution 650 mg  650 mg Oral Q4H PRRamírez Meléndez Jr., MD        Or    acetaminophen (TYLENOL) suppository 650 mg  650 mg Rectal Q4H PRRamírez Meléndez Jr., MD         ALPRAZolam (XANAX) tablet 0.5 mg  0.5 mg Oral Nightly PRN Ramírez Stout Jr., MD   0.5 mg at 01/23/25 2020    apixaban (ELIQUIS) tablet 5 mg  5 mg Oral BID Ramírez Stout Jr., MD   5 mg at 01/24/25 0843    aspirin chewable tablet 81 mg  81 mg Oral Daily Ramírez Stout Jr., MD   81 mg at 01/24/25 0842    benzocaine-menthol (CEPACOL) lozenge 1 each  1 lozenge Mouth/Throat Q2H PRN Niraj Colon DO   1 each at 01/24/25 0554    sennosides-docusate (PERICOLACE) 8.6-50 MG per tablet 2 tablet  2 tablet Oral BID PRN Ramírez Stout Jr., MD        And    polyethylene glycol (MIRALAX) packet 17 g  17 g Oral Daily PRN Ramírez Stout Jr., MD        And    bisacodyl (DULCOLAX) EC tablet 5 mg  5 mg Oral Daily PRN Ramírez Stout Jr., MD        And    bisacodyl (DULCOLAX) suppository 10 mg  10 mg Rectal Daily PRN Ramírez Stout Jr., MD        Calcium Replacement - Follow Nurse / BPA Driven Protocol   Not Applicable PRN Ramírez Stout Jr., MD        carbidopa-levodopa ER (SINEMET CR)  MG per tablet 1 tablet  1 tablet Oral BID Ramírez Stout Jr., MD   1 tablet at 01/24/25 0844    Cariprazine HCl (VRAYLAR) capsule capsule 1.5 mg  1.5 mg Oral Daily Ramírez Stout Jr., MD   1.5 mg at 01/24/25 0842    escitalopram (LEXAPRO) tablet 10 mg  10 mg Oral Daily Ramírez Stout Jr., MD   10 mg at 01/24/25 0843    lisinopril (PRINIVIL,ZESTRIL) tablet 20 mg  20 mg Oral Q24H Ramírez Stout Jr., MD   20 mg at 01/24/25 0844    And    hydroCHLOROthiazide oral 12.5 mg  12.5 mg Oral Q24H Ramírez Stout Jr., MD   12.5 mg at 01/24/25 0844    Magnesium Standard Dose Replacement - Follow Nurse / BPA Driven Protocol   Not Applicable PRN Ramírez Stout Jr., MD        ondansetron (ZOFRAN) injection 4 mg  4 mg Intravenous Q6H PRN Ramírez Stout Jr., MD        oseltamivir (TAMIFLU) capsule 75 mg  75 mg Oral Q12H Francisco Whitmore DO   75 mg at 01/24/25 0842    pantoprazole (PROTONIX) EC tablet 40 mg  40 mg Oral Q AM Ramírez Stout Jr., MD    40 mg at 01/24/25 0553    Phosphorus Replacement - Follow Nurse / BPA Driven Protocol   Not Applicable Ramírez Carranza Jr., MD        Potassium Replacement - Follow Nurse / BPA Driven Protocol   Not Applicable Ramírez Carranza Jr., MD        sodium chloride 0.9 % flush 10 mL  10 mL Intravenous PRFrancisco Montelongo DO        sodium chloride 0.9 % flush 10 mL  10 mL Intravenous Q12H Ramírez Stout Jr., MD   10 mL at 01/24/25 0844    sodium chloride 0.9 % flush 10 mL  10 mL Intravenous PRN Ramírez Stout Jr., MD        sodium chloride 0.9 % infusion 40 mL  40 mL Intravenous Ramírez Carranza Jr., MD         Lab Results (last 24 hours)       Procedure Component Value Units Date/Time    Comprehensive Metabolic Panel [[id number]]  (Abnormal) Collected: 01/24/25 0613    Specimen: Blood Updated: 01/24/25 0654     Glucose 109 mg/dL      BUN 17 mg/dL      Creatinine 0.79 mg/dL      Sodium 137 mmol/L      Potassium 3.8 mmol/L      Comment: Slight hemolysis detected by analyzer. Result may be falsely elevated.        Chloride 104 mmol/L      CO2 21.7 mmol/L      Calcium 8.6 mg/dL      Total Protein 6.2 g/dL      Albumin 3.6 g/dL      ALT (SGPT) 10 U/L      AST (SGOT) 30 U/L      Comment: Slight hemolysis detected by analyzer. Result may be falsely elevated.        Alkaline Phosphatase 54 U/L      Total Bilirubin 0.3 mg/dL      Globulin 2.6 gm/dL      A/G Ratio 1.4 g/dL      BUN/Creatinine Ratio 21.5     Anion Gap 11.3 mmol/L      eGFR 91.5 mL/min/1.73     Narrative:      GFR Categories in Chronic Kidney Disease (CKD)      GFR Category          GFR (mL/min/1.73)    Interpretation  G1                     90 or greater         Normal or high (1)  G2                      60-89                Mild decrease (1)  G3a                   45-59                Mild to moderate decrease  G3b                   30-44                Moderate to severe decrease  G4                    15-29                Severe decrease  G5                     14 or less           Kidney failure          (1)In the absence of evidence of kidney disease, neither GFR category G1 or G2 fulfill the criteria for CKD.    eGFR calculation 2021 CKD-EPI creatinine equation, which does not include race as a factor    Magnesium [[id number]]  (Normal) Collected: 01/24/25 0613    Specimen: Blood Updated: 01/24/25 0652     Magnesium 1.7 mg/dL     High Sensitivity Troponin T 1Hr [[id number]]  (Normal) Collected: 01/23/25 1032    Specimen: Blood Updated: 01/23/25 1103     HS Troponin T 14 ng/L      Troponin T Numeric Delta 0 ng/L     Narrative:      High Sensitive Troponin T Reference Range:  <14.0 ng/L- Negative Female for AMI  <22.0 ng/L- Negative Male for AMI  >=14 - Abnormal Female indicating possible myocardial injury.  >=22 - Abnormal Male indicating possible myocardial injury.   Clinicians would have to utilize clinical acumen, EKG, Troponin, and serial changes to determine if it is an Acute Myocardial Infarction or myocardial injury due to an underlying chronic condition.               Imaging Results (Last 24 Hours)       ** No results found for the last 24 hours. **          Physician Progress Notes (last 24 hours)  Notes from 01/23/25 1041 through 01/24/25 1041   No notes of this type exist for this encounter.       Consult Notes (last 24 hours)  Notes from 01/23/25 1041 through 01/24/25 1041   No notes of this type exist for this encounter.

## 2025-01-24 NOTE — THERAPY EVALUATION
Patient Name: Tristan Hillman  : 1945    MRN: [id number]                              Today's Date: 2025       Admit Date: 2025    Visit Dx:     ICD-10-CM ICD-9-CM   1. Influenza A  J10.1 487.1   2. Generalized weakness  R53.1 780.79     Patient Active Problem List   Diagnosis    Arthralgia of multiple joints    Generalized anxiety disorder    Gastroesophageal reflux disease without esophagitis    Dyslipidemia    Primary hypertension    Insomnia    Bradycardia    Chronic left-sided low back pain without sciatica    Idiopathic peripheral neuropathy    Coronary artery disease involving native coronary artery without angina pectoris    Chronic tension-type headache, not intractable    Adhesive capsulitis of right shoulder    Rotator cuff injury, right, initial encounter    Near syncope    Paroxysmal atrial fibrillation    Weakness of both lower extremities    Chronic right hip pain    Atherosclerosis of native arteries of extremities with intermittent claudication, bilateral legs    Atrial flutter with rapid ventricular response    Influenza A     Past Medical History:   Diagnosis Date    Ankle sprain     Anxiety     Arthritis of back     Arthritis of neck     Bursitis of hip     Coronary artery disease     GERD (gastroesophageal reflux disease)     Hip arthrosis     History of insomnia     Hyperlipidemia     Hypertension     Low back pain     Low back strain     Neck strain     Periarthritis of shoulder     Rotator cuff syndrome     Tennis elbow      Past Surgical History:   Procedure Laterality Date    CORONARY STENT PLACEMENT        General Information       Row Name 25 0937          OT Time and Intention    Subjective Information no complaints  -AH     Document Type evaluation  -AH     Mode of Treatment occupational therapy  -     Patient Effort good  -       Row Name 25 0937          General Information    Patient Profile Reviewed yes  -AH     Prior Level of Function  independent:;ADL's;all household mobility  Pt has a rollator, cane, showre chair and bedside commode at home, his daughter reports he normally uses the cane, but has been using the rollator lately.  -     Existing Precautions/Restrictions fall  -     Barriers to Rehab previous functional deficit  -       Row Name 01/24/25 0937          Occupational Profile    Reason for Services/Referral (Occupational Profile) ADL decline  -[facility] Name 01/24/25 0937          Living Environment    People in Home spouse  -[facility] Name 01/24/25 0937          Home Main Entrance    Number of Stairs, Main Entrance other (see comments)  ramp to enter and one small step to go over the threshhold  -       Row Name 01/24/25 0937          Stairs Within Home, Primary    Number of Stairs, Within Home, Primary none  -[facility] Name 01/24/25 0937          Cognition    Orientation Status (Cognition) oriented x 4;verbal cues/prompts needed for orientation  -[facility] Name 01/24/25 0937          Safety Issues/Impairments Affecting Functional Mobility    Safety Issues Affecting Function (Mobility) safety precaution awareness;safety precautions follow-through/compliance;insight into deficits/self-awareness;awareness of need for assistance  -     Impairments Affecting Function (Mobility) balance;coordination;endurance/activity tolerance;motor planning;strength  -               User Key  (r) = Recorded By, (t) = Taken By, (c) = Cosigned By      Initials Name Provider Type     Lalitha Pritchard Occupational Therapist                     Mobility/ADL's       Row Name 01/24/25 0937          Bed Mobility    Comment, (Bed Mobility) pt in the bathroom upon entering room  -[facility] Name 01/24/25 0937          Transfers    Transfers sit-stand transfer  -[facility] Name 01/24/25 0937          Sit-Stand Transfer    Sit-Stand [place] (Transfers) minimum assist (75% patient effort);verbal cues  -     Assistive Device  (Sit-Stand Transfers) walker, front-wheeled  -[facility] Name 01/24/25 0937          Functional Mobility    Functional Mobility- Ind. Level minimum assist (75% patient effort);verbal cues required  -     Functional Mobility- Device walker, front-wheeled  -     Functional Mobility-Distance (Feet) 20  -     Patient was able to Ambulate yes  -[facility] Name 01/24/25 0937          Activities of Daily Living    BADL Assessment/Intervention bathing;upper body dressing;lower body dressing;grooming;feeding;toileting  -[facility] Name 01/24/25 0937          Bathing Assessment/Intervention    [place] Level (Bathing) minimum assist (75% patient effort)  -[facility] Name 01/24/25 09          Upper Body Dressing Assessment/Training    [place] Level (Upper Body Dressing) set up  -[facility] Name 01/24/25 09          Lower Body Dressing Assessment/Training    [place] Level (Lower Body Dressing) moderate assist (50% patient effort)  -[facility] Name 01/24/25 09          Grooming Assessment/Training    [place] Level (Grooming) set up  -[facility] Name 01/24/25 09          Self-Feeding Assessment/Training    [place] Level (Feeding) set up  -[facility] Name 01/24/25 09          Toileting Assessment/Training    [place] Level (Toileting) supervision  -               User Key  (r) = Recorded By, (t) = Taken By, (c) = Cosigned By      Initials Name Provider Type    Lalitha Garrett Occupational Therapist                   Obj/Interventions       [facility] Name 01/24/25 0937          Sensory Assessment (Somatosensory)    Sensory Assessment (Somatosensory) sensation intact  -AH       Row Name 01/24/25 0937          Vision Assessment/Intervention    Visual Impairment/Limitations WFL;corrective lenses full-time  -[facility] Name 01/24/25 0937          Range of Motion Comprehensive    General Range of Motion bilateral upper extremity ROM WFL  -[facility] Name 01/24/25 0937           Strength Comprehensive (MMT)    Comment, General Manual Muscle Testing (MMT) Assessment [facility]  -               User Key  (r) = Recorded By, (t) = Taken By, (c) = Cosigned By      Initials Name Provider Type    Lalitha Garrett Occupational Therapist                   Goals/Plan       Row Name 01/24/25 0937          Bed Mobility Goal 1 (OT)    Activity/Assistive Device (Bed Mobility Goal 1, OT) bed mobility activities, all  -     [place] Level/Cues Needed (Bed Mobility Goal 1, OT) supervision required  -     Time Frame (Bed Mobility Goal 1, OT) by discharge  -     Progress/Outcomes (Bed Mobility Goal 1, OT) goal ongoing  -       Row Name 01/24/25 0937          Transfer Goal 1 (OT)    Activity/Assistive Device (Transfer Goal 1, OT) sit-to-stand/stand-to-sit;walker, rolling  -     [place] Level/Cues Needed (Transfer Goal 1, OT) contact guard required  -     Time Frame (Transfer Goal 1, OT) by discharge  -     Progress/Outcome (Transfer Goal 1, OT) goal ongoing  -       Row Name 01/24/25 0937          Bathing Goal 1 (OT)    Activity/Device (Bathing Goal 1, OT) bathing skills, all  -     [place] Level/Cues Needed (Bathing Goal 1, OT) set-up required  -     Time Frame (Bathing Goal 1, OT) long term goal (LTG);1 week  -     Progress/Outcomes (Bathing Goal 1, OT) goal ongoing  -       Row Name 01/24/25 0937          Dressing Goal 1 (OT)    Activity/Device (Dressing Goal 1, OT) lower body dressing  -     [place]/Cues Needed (Dressing Goal 1, OT) contact guard required  -     Time Frame (Dressing Goal 1, OT) long term goal (LTG);1 week  -     Progress/Outcome (Dressing Goal 1, OT) goal ongoing  -       Row Name 01/24/25 0937          Strength Goal 1 (OT)    Strength Goal 1 (OT) Pt will perform UB strengthening ex using theraband for resistance.  -AH     Time Frame (Strength Goal 1, OT) by discharge  -     Progress/Outcome (Strength Goal 1, OT) goal ongoing  -        Row Name 01/24/25 0937          Therapy Assessment/Plan (OT)    Planned Therapy Interventions (OT) activity tolerance training;BADL retraining;patient/caregiver education/training;transfer/mobility retraining;strengthening exercise  -               User Key  (r) = Recorded By, (t) = Taken By, (c) = Cosigned By      Initials Name Provider Type     Lalitha Pritchard Occupational Therapist                   Clinical Impression       [facility] Name 01/24/25 0937          Pain Assessment    Pretreatment Pain Rating 0/10 - no pain  -     Posttreatment Pain Rating 0/10 - no pain  -[facility] Name 01/24/25 0937          Plan of Care Review    Plan of Care Reviewed With patient;daughter  -     Progress no change  -     Outcome Evaluation OT evaluation completed today.  Pt lives at home with his wife and is normally able to care for himself.  He uses a cane most of the time, but has been using his rollator lately d/t feeling weak.  Pt recieved sitting in the bathroom this morning, daughter present and assisted with functional history.  Pt able to stand from bedside commode with min assist using RW.  He walked 20' to his chair with verbal cues and min assist.  Pt noted to have short, shuffling gait with cues needed to take bigger steps.  Pt min assist for bathing, mod assist LB dressing.  Pt is expected to benefit from skilled OT to improve his independence with ADL tasks.  Pt wants to d/c home with home health, daughter feels that is best for him.  -[facility] Name 01/24/25 0937          Therapy Assessment/Plan (OT)    Patient/Family Therapy Goal Statement (OT) d/c home with home health  -     Rehab Potential (OT) good  -     Criteria for Skilled Therapeutic Interventions Met (OT) yes;skilled treatment is necessary  [facility]     Therapy Frequency (OT) 3 times/wk  -       Row Name 01/24/25 0937          Therapy Plan Review/Discharge Plan (OT)    Anticipated Discharge Disposition (OT) home with home health  -        Row Name 01/24/25 0937          Positioning and Restraints    Pre-Treatment Position bathroom  -AH     Post Treatment Position chair  -AH     In Chair reclined;call light within reach;encouraged to call for assist;exit alarm on;with family/caregiver  -               User Key  (r) = Recorded By, (t) = Taken By, (c) = Cosigned By      Initials Name Provider Type    Lalitha Garrett Occupational Therapist                   Outcome Measures       Row Name 01/24/25 0937          How much help from another is currently needed...    Putting on and taking off regular lower body clothing? 3  -AH     Bathing (including washing, rinsing, and drying) 3  -AH     Toileting (which includes using toilet bed pan or urinal) 3  -AH     Putting on and taking off regular upper body clothing 3  -AH     Taking care of personal grooming (such as brushing teeth) 3  -AH     Eating meals 4  -AH     AM-PAC 6 Clicks Score (OT) 19  -AH       Row Name 01/24/25 1013 01/24/25 0844       How much help from another person do you currently need...    Turning from your back to your side while in flat bed without using bedrails? 3  -MK 3  -HS    Moving from lying on back to sitting on the side of a flat bed without bedrails? 3  -MK 3  -HS    Moving to and from a bed to a chair (including a wheelchair)? 3  -MK 2  -HS    Standing up from a chair using your arms (e.g., wheelchair, bedside chair)? 3  -MK 2  -HS    Climbing 3-5 steps with a railing? 2  -MK 1  -HS    To walk in hospital room? 3  -MK 1  -HS    AM-PAC 6 Clicks Score (PT) 17  -MK 12  -HS    Highest Level of Mobility Goal 5 --> Static standing  -MK 4 --> Transfer to chair/commode  -HS      Row Name 01/23/25 8812          How much help from another person do you currently need...    Turning from your back to your side while in flat bed without using bedrails? 3  -CH     Moving from lying on back to sitting on the side of a flat bed without bedrails? 3  -CH     Moving to and from a bed to a  chair (including a wheelchair)? 2  -CH     Standing up from a chair using your arms (e.g., wheelchair, bedside chair)? 2  -CH     Climbing 3-5 steps with a railing? 1  -CH     To walk in hospital room? 1  -CH     AM-PAC 6 Clicks Score (PT) 12  -CH     Highest Level of Mobility Goal 4 --> Transfer to chair/commode  -       Row Name 01/24/25 1013 01/24/25 0937       Functional Assessment    Outcome Measure Options AM-PAC 6 Clicks Basic Mobility (PT)  - AM-PAC 6 Clicks Daily Activity (OT)  -              User Key  (r) = Recorded By, (t) = Taken By, (c) = Cosigned By      Initials Name Provider Type    Lalitha Garrett Occupational Therapist    Lynne Jones RN Registered Nurse    Kelsey Edmondson, RN Registered Nurse    Niraj Roberts, PT Physical Therapist                    Occupational Therapy Education       Title: PT OT SLP Therapies (In Progress)       Topic: Occupational Therapy (In Progress)       Point: ADL training (Done)       Description:   Instruct learner(s) on proper safety adaptation and remediation techniques during self care or transfers.   Instruct in proper use of assistive devices.                  Learning Progress Summary            Patient Acceptance, E,TB, VU by  at 1/24/2025 1013    Comment: Role of OT/POC   Family Acceptance, E,TB, VU by  at 1/24/2025 1013    Comment: Role of OT/POC                      Point: Home exercise program (Not Started)       Description:   Instruct learner(s) on appropriate technique for monitoring, assisting and/or progressing therapeutic exercises/activities.                  Learner Progress:  Not documented in this visit.              Point: Precautions (Not Started)       Description:   Instruct learner(s) on prescribed precautions during self-care and functional transfers.                  Learner Progress:  Not documented in this visit.              Point: Body mechanics (Not Started)       Description:   Instruct learner(s) on proper  positioning and spine alignment during self-care, functional mobility activities and/or exercises.                  Learner Progress:  Not documented in this visit.                              User Key       Initials Effective Dates Name Provider Type Discipline     06/16/21 -  Lalitha Pritchard Occupational Therapist OT                  OT Recommendation and Plan  Planned Therapy Interventions (OT): activity tolerance training, BADL retraining, patient/caregiver education/training, transfer/mobility retraining, strengthening exercise  Therapy Frequency (OT): 3 times/wk  Plan of Care Review  Plan of Care Reviewed With: patient, daughter  Progress: no change  Outcome Evaluation: OT evaluation completed today.  Pt lives at home with his wife and is normally able to care for himself.  He uses a cane most of the time, but has been using his rollator lately d/t feeling weak.  Pt recieved sitting in the bathroom this morning, daughter present and assisted with functional history.  Pt able to stand from bedside commode with min assist using RW.  He walked 20' to his chair with verbal cues and min assist.  Pt noted to have short, shuffling gait with cues needed to take bigger steps.  Pt min assist for bathing, mod assist LB dressing.  Pt is expected to benefit from skilled OT to improve his independence with ADL tasks.  Pt wants to d/c home with home health, daughter feels that is best for him.     Time Calculation:   Evaluation Complexity (OT)  Review Occupational Profile/Medical/Therapy History Complexity: expanded/moderate complexity  Assessment, Occupational Performance/Identification of Deficit Complexity: 3-5 performance deficits  Clinical Decision Making Complexity (OT): detailed assessment/moderate complexity  Overall Complexity of Evaluation (OT): moderate complexity     Time Calculation- OT       Row Name 01/24/25 0937             Time Calculation- OT    OT Start Time 0937  -      OT Received On 01/24/25  -       OT Goal Re-Cert Due Date 02/03/25  -         Untimed Charges    OT Eval/Re-eval Minutes 45  -AH         Total Minutes    Untimed Charges Total Minutes 45  -AH       Total Minutes 45  -AH                User Key  (r) = Recorded By, (t) = Taken By, (c) = Cosigned By      Initials Name Provider Type    Lalitha Garrett Occupational Therapist                  Therapy Charges for Today       Code Description Service Date Service Provider Modifiers Qty    87656038201 HC OT EVAL MOD COMPLEXITY 3 1/24/2025 Lalitha Pritchard GO 1                 Lalitha Pritchard  1/24/2025

## 2025-01-24 NOTE — PROGRESS NOTES
[facility]    Name:  Tristan Hillman   Age:  79 y.o.  Sex:  male  :  1945  MRN:  [id number]   Visit Number:  [id number]  Admission Date:  2025  Date Of Service:  25  Primary Care Physician:  Kerri Chew APRN     LOS: 0 days :    Chief Complaint:      Generalized weakness.    Subjective:    Tristan Hillman was seen and examined this morning.  He is currently sitting up on the chair and is feeling better.  He was able to walk to the bathroom with help.  He was admitted for influenza and significant weakness and inability to walk.  Patient lives with his wife and was able to walk with walker before.  He is hard of hearing.  He was noted to have a low-grade fever of 100.9 this morning.    Hospital Course:    Tristan Hillman is a 79-year-old male with history of hypertension, paroxysmal atrial fibrillation on apixaban, parkinsonism on Sinemet was admitted from the emergency room with progressive generalized weakness and fevers of 1 day duration.  Patient also gives history of urinary frequency and urgency as well as incomplete emptying of the bladder.  Patient lives with his wife and uses a walker to ambulate.    In the emergency room, he was initially afebrile and hemodynamically stable saturating at 93% on room air.  Troponin x 2 were unremarkable.  CMP was unremarkable except for a glucose of 140.  CBC was within normal range.  Nasal swab however came back positive for influenza A but negative for COVID.  Chest x-ray was unremarkable.  Patient was initiated on Tamiflu and was planned for discharge but due to significant generalized weakness and inability to walk, he was admitted to the medical floor with telemetry.  In the emergency room, due to urinary retention a Scott catheter was placed.    Review of Systems:     All systems were reviewed and negative except as mentioned in subjective, assessment and plan.    Vital Signs:    Temp:  [97.8 °F (36.6  °C)-100.9 °F (38.3 °C)] 97.8 °F (36.6 °C)  Heart Rate:  [60-95] 64  Resp:  [18-22] 22  BP: (119-176)/() 130/61    Intake and output:    I/O last 3 completed shifts:  In: -   Out: 375 [Urine:375]  I/O this shift:  In: 480 [P.O.:480]  Out: 525 [Urine:525]    Physical Examination:    General Appearance:  Alert and cooperative.    Head:  Atraumatic and normocephalic.  Left ear hearing aid noted.   Eyes: Conjunctivae and sclerae normal, no icterus. No pallor.   Throat: No oral lesions, no thrush, oral mucosa moist.   Neck: Supple, trachea midline, no thyromegaly.   Lungs:   Breath sounds heard bilaterally equally.  No wheezing or crackles. No Pleural rub or bronchial breathing.   Heart:  Normal S1 and S2, no murmur, no gallop, no rub. No JVD.   Abdomen:   Normal bowel sounds, no masses, no organomegaly. Soft, nontender, nondistended, no rebound tenderness.   Extremities: Supple, no edema, no cyanosis, no clubbing.   Skin: No bleeding or rash.   Neurologic: Alert and oriented x 3. No facial asymmetry. Moves all four limbs but does have generalized weakness. No tremors.  Hard of hearing.   Laboratory results:    Results from last 7 days   Lab Units 01/24/25  0613 01/23/25  0920   SODIUM mmol/L 137 140   POTASSIUM mmol/L 3.8 3.9   CHLORIDE mmol/L 104 101   CO2 mmol/L 21.7* 24.6   BUN mg/dL 17 16   CREATININE mg/dL 0.79 0.96   CALCIUM mg/dL 8.6 9.2   BILIRUBIN mg/dL 0.3 0.4   ALK PHOS U/L 54 64   ALT (SGPT) U/L 10 19   AST (SGOT) U/L 30 22   GLUCOSE mg/dL 109* 140*     Results from last 7 days   Lab Units 01/23/25  0920   WBC 10*3/mm3 7.55   HEMOGLOBIN g/dL 13.9   HEMATOCRIT % 39.7   PLATELETS 10*3/mm3 146       Results from last 7 days   Lab Units 01/23/25  1032 01/23/25  0920   HSTROP T ng/L 14 14       I have reviewed the patient's laboratory results.    Radiology results:    XR Chest 1 View    Result Date: 1/23/2025  PROCEDURE: XR CHEST 1 VW-  HISTORY: Weak/Dizzy/AMS triage protocol, coronary artery disease   COMPARISON:  None  FINDINGS:  Portable view of the chest demonstrates the lungs to be grossly clear. There is no evidence of effusion, pneumothorax or other significant pleural disease. The mediastinum is unremarkable.  The heart size is normal.      Impression: Unremarkable portable chest.    This report was signed and finalized on 1/23/2025 9:58 AM by Jf Murray MD.     I have reviewed the patient's radiology reports.    Medication Review:     I have reviewed the patient's active and prn medications.     Problem List:      Influenza A    Assessment:    Acute influenza A bronchitis, POA.  Generalized weakness secondary to #1, POA.  Urinary retention secondary to BPH, POA.  Paroxysmal atrial fibrillation on apixaban.  Essential hypertension.  Parkinsonism on Sinemet.  Hard of hearing.    Plan:    Acute influenza A.  - Continue and finish a course of Tamiflu.  - Not on oxygen.    BPH/urinary retention.  - We will start him on Flomax and Proscar.  - We will discontinue Scott catheter this afternoon.  - We will schedule him to follow-up with urology.    Paroxysmal atrial fibrillation.  - Continue apixaban.  - Rate controlled.  Patient does not seem to be on any negative chronotropic agents at home.    Parkinsonism/impaired mobility.  - Continue Sinemet.  - Continue physical and occupational therapy.  - Patient will also be arranged home hospital bed.  The patient has a medical condition which requires frequent changes in body positioning in ways not feasibly achieved with an ordinary bed.      I have discussed the patient's condition and treatment plan with his daughter Alayna who is at the bedside.  Discussed with nursing staff and multidisciplinary team.    I have reviewed the copied text and it is accurate as of 01/24/25    DVT Prophylaxis: Apixaban  Code Status: Full  Diet: Cardiac  Discharge Plan: Home with home health (ordered) tomorrow.    Driss Del Rosario MD  01/24/25  13:02 EST    Dictated utilizing Dragon  dictation.

## 2025-01-24 NOTE — PLAN OF CARE
Goal Outcome Evaluation:  Plan of Care Reviewed With: patient, daughter        Progress: no change  Outcome Evaluation: OT evaluation completed today.  Pt lives at home with his wife and is normally able to care for himself.  He uses a cane most of the time, but has been using his rollator lately d/t feeling weak.  Pt recieved sitting in the bathroom this morning, daughter present and assisted with functional history.  Pt able to stand from bedside commode with min assist using RW.  He walked 20' to his chair with verbal cues and min assist.  Pt noted to have short, shuffling gait with cues needed to take bigger steps.  Pt min assist for bathing, mod assist LB dressing.  Pt is expected to benefit from skilled OT to improve his independence with ADL tasks.  Pt wants to d/c home with home health, daughter feels that is best for him.    Anticipated Discharge Disposition (OT): home with home health

## 2025-01-25 LAB
BACTERIA UR QL AUTO: ABNORMAL /HPF
BILIRUB UR QL STRIP: NEGATIVE
CLARITY UR: CLEAR
COLOR UR: ABNORMAL
GLUCOSE UR STRIP-MCNC: NEGATIVE MG/DL
HGB UR QL STRIP.AUTO: ABNORMAL
HYALINE CASTS UR QL AUTO: ABNORMAL /LPF
KETONES UR QL STRIP: NEGATIVE
LEUKOCYTE ESTERASE UR QL STRIP.AUTO: NEGATIVE
NITRITE UR QL STRIP: NEGATIVE
PH UR STRIP.AUTO: 5.5 [PH] (ref 5–8)
PROT UR QL STRIP: NEGATIVE
RBC # UR STRIP: ABNORMAL /HPF
REF LAB TEST METHOD: ABNORMAL
SP GR UR STRIP: 1.01 (ref 1–1.03)
SQUAMOUS #/AREA URNS HPF: ABNORMAL /HPF
UROBILINOGEN UR QL STRIP: ABNORMAL
WBC # UR STRIP: ABNORMAL /HPF

## 2025-01-25 PROCEDURE — 99232 SBSQ HOSP IP/OBS MODERATE 35: CPT | Performed by: FAMILY MEDICINE

## 2025-01-25 PROCEDURE — 97116 GAIT TRAINING THERAPY: CPT

## 2025-01-25 PROCEDURE — G0378 HOSPITAL OBSERVATION PER HR: HCPCS

## 2025-01-25 PROCEDURE — 97110 THERAPEUTIC EXERCISES: CPT

## 2025-01-25 PROCEDURE — 97530 THERAPEUTIC ACTIVITIES: CPT

## 2025-01-25 PROCEDURE — 81001 URINALYSIS AUTO W/SCOPE: CPT | Performed by: FAMILY MEDICINE

## 2025-01-25 RX ADMIN — ROSUVASTATIN CALCIUM 5 MG: 5 TABLET, FILM COATED ORAL at 09:43

## 2025-01-25 RX ADMIN — FINASTERIDE 5 MG: 5 TABLET, FILM COATED ORAL at 09:43

## 2025-01-25 RX ADMIN — HYDROCHLOROTHIAZIDE 12.5 MG: 12.5 CAPSULE ORAL at 09:43

## 2025-01-25 RX ADMIN — OSELTAMIVIR PHOSPHATE 75 MG: 75 CAPSULE ORAL at 09:43

## 2025-01-25 RX ADMIN — ASPIRIN 81 MG CHEWABLE TABLET 81 MG: 81 TABLET CHEWABLE at 09:43

## 2025-01-25 RX ADMIN — OSELTAMIVIR PHOSPHATE 75 MG: 75 CAPSULE ORAL at 21:16

## 2025-01-25 RX ADMIN — ESCITALOPRAM OXALATE 10 MG: 10 TABLET ORAL at 09:43

## 2025-01-25 RX ADMIN — CARBIDOPA AND LEVODOPA 1 TABLET: 25; 100 TABLET, EXTENDED RELEASE ORAL at 09:43

## 2025-01-25 RX ADMIN — PANTOPRAZOLE SODIUM 40 MG: 40 TABLET, DELAYED RELEASE ORAL at 05:41

## 2025-01-25 RX ADMIN — Medication 10 ML: at 21:17

## 2025-01-25 RX ADMIN — APIXABAN 5 MG: 5 TABLET, FILM COATED ORAL at 21:16

## 2025-01-25 RX ADMIN — Medication 10 ML: at 09:47

## 2025-01-25 RX ADMIN — CARIPRAZINE 1.5 MG: 1.5 CAPSULE, GELATIN COATED ORAL at 09:43

## 2025-01-25 RX ADMIN — TAMSULOSIN HYDROCHLORIDE 0.4 MG: 0.4 CAPSULE ORAL at 09:43

## 2025-01-25 RX ADMIN — ALPRAZOLAM 0.5 MG: 0.5 TABLET ORAL at 21:16

## 2025-01-25 RX ADMIN — APIXABAN 5 MG: 5 TABLET, FILM COATED ORAL at 09:43

## 2025-01-25 RX ADMIN — LISINOPRIL 20 MG: 20 TABLET ORAL at 09:43

## 2025-01-25 RX ADMIN — B-COMPLEX W/ C & FOLIC ACID TAB 1 TABLET: TAB at 09:43

## 2025-01-25 RX ADMIN — CARBIDOPA AND LEVODOPA 1 TABLET: 25; 100 TABLET, EXTENDED RELEASE ORAL at 21:16

## 2025-01-25 NOTE — THERAPY TREATMENT NOTE
Patient Name: Tristan Hillman  : 1945    MRN: [id number]                              Today's Date: 2025       Admit Date: 2025    Visit Dx:     ICD-10-CM ICD-9-CM   1. Influenza A  J10.1 487.1   2. Generalized weakness  R53.1 780.79   3. Idiopathic peripheral neuropathy  G60.9 356.9   4. Arthralgia of multiple joints  M25.50 719.49     Patient Active Problem List   Diagnosis    Arthralgia of multiple joints    Generalized anxiety disorder    Gastroesophageal reflux disease without esophagitis    Dyslipidemia    Primary hypertension    Insomnia    Bradycardia    Chronic left-sided low back pain without sciatica    Idiopathic peripheral neuropathy    Coronary artery disease involving native coronary artery without angina pectoris    Chronic tension-type headache, not intractable    Adhesive capsulitis of right shoulder    Rotator cuff injury, right, initial encounter    Near syncope    Paroxysmal atrial fibrillation    Weakness of both lower extremities    Chronic right hip pain    Atherosclerosis of native arteries of extremities with intermittent claudication, bilateral legs    Atrial flutter with rapid ventricular response    Influenza A     Past Medical History:   Diagnosis Date    Ankle sprain     Anxiety     Arthritis of back     Arthritis of neck     Bursitis of hip     Coronary artery disease     GERD (gastroesophageal reflux disease)     Hip arthrosis     History of insomnia     Hyperlipidemia     Hypertension     Low back pain     Low back strain     Neck strain     Periarthritis of shoulder     Rotator cuff syndrome     Tennis elbow      Past Surgical History:   Procedure Laterality Date    CORONARY STENT PLACEMENT        General Information       Row Name 25 8801          Physical Therapy Time and Intention    Document Type therapy note (daily note)  -CC     Mode of Treatment physical therapy  -CC       Row Name 25 9443          General Information    Patient Profile Reviewed yes   -CC     Existing Precautions/Restrictions fall  -CC       Row Name 01/25/25 1750          Safety Issues/Impairments Affecting Functional Mobility    Safety Issues Affecting Function (Mobility) insight into deficits/self-awareness;awareness of need for assistance;safety precaution awareness;safety precautions follow-through/compliance  -CC     Impairments Affecting Function (Mobility) balance;coordination;endurance/activity tolerance;motor planning;strength  -CC               User Key  (r) = Recorded By, (t) = Taken By, (c) = Cosigned By      Initials Name Provider Type    Chery Diaz PTA Physical Therapist Assistant                   Mobility       Row Name 01/25/25 1751          Gait/Stairs (Locomotion)    Patient was able to Ambulate yes  -CC     Distance in Feet (Gait) 25  -CC               User Key  (r) = Recorded By, (t) = Taken By, (c) = Cosigned By      Initials Name Provider Type    Chery Diaz PTA Physical Therapist Assistant                   Obj/Interventions    No documentation.                  Goals/Plan    No documentation.                  Clinical Impression       Row Name 01/25/25 1751          Pain    Pretreatment Pain Rating 0/10 - no pain  -CC     Posttreatment Pain Rating 0/10 - no pain  -CC       Row Name 01/25/25 1751          Plan of Care Review    Plan of Care Reviewed With patient;family  -CC     Progress improving  -     Outcome Evaluation Pt agreeable to physical therapy. Performed supine to sit min/CGA, scooting to EOB CGA, sit <->stand RW with CGA and VC for hand placement, amb with RW 25 feet with CGA and increased time with occ VC for safety during turns. Performed B LE ex in sitting LAQ, hip abd, marching and reclined QS, AP and glute sets 1x15 reps with increased time to process VC to perform tasks. Pt with fatigue end of therapy session and edu pt and family importance of performing ex 3-5 days per week. Con't with PT POC and progress as tolerated  -CC        Row Name 01/25/25 1751          Positioning and Restraints    Pre-Treatment Position in bed  -CC     Post Treatment Position chair  -CC     In Chair notified nsg;reclined;call light within reach;encouraged to call for assist;with family/caregiver  -               User Key  (r) = Recorded By, (t) = Taken By, (c) = Cosigned By      Initials Name Provider Type    Chery Diaz, VARGAS Physical Therapist Assistant                   Outcome Measures       Row Name 01/25/25 1757 01/25/25 0945       How much help from another person do you currently need...    Turning from your back to your side while in flat bed without using bedrails? 4  -CC 3  -KS    Moving from lying on back to sitting on the side of a flat bed without bedrails? 3  -CC 3  -KS    Moving to and from a bed to a chair (including a wheelchair)? 3  -CC 3  -KS    Standing up from a chair using your arms (e.g., wheelchair, bedside chair)? 3  -CC 3  -KS    Climbing 3-5 steps with a railing? 2  -CC 2  -KS    To walk in hospital room? 3  -CC 2  -KS    AM-PAC 6 Clicks Score (PT) 18  -CC 16  -KS    Highest Level of Mobility Goal 6 --> Walk 10 steps or more  -CC 5 --> Static standing  -KS      Row Name 01/25/25 1757          Functional Assessment    Outcome Measure Options AM-PAC 6 Clicks Basic Mobility (PT)  -CC               User Key  (r) = Recorded By, (t) = Taken By, (c) = Cosigned By      Initials Name Provider Type    Chery Diaz PTA Physical Therapist Assistant    Alvaro Black RN Registered Nurse                                 Physical Therapy Education       Title: PT OT SLP Therapies (In Progress)       Topic: Physical Therapy (In Progress)       Point: Mobility training (Done)       Learning Progress Summary            Patient Acceptance, E,D, DU,VU by  at 1/24/2025 1014                      Point: Home exercise program (Done)       Learning Progress Summary            Patient Acceptance, E,TB, VU by CC at 1/25/2025 1758    Family Acceptance, E,TB, VU by  at 1/25/2025 1758                      Point: Body mechanics (Done)       Learning Progress Summary            Patient Acceptance, E,D, DU,VU by  at 1/24/2025 1014                      Point: Precautions (Not Started)       Learner Progress:  Not documented in this visit.                              User Key       Initials Effective Dates Name Provider Type Discipline     06/16/21 -  Chery Garcia PTA Physical Therapist Assistant PT     06/13/23 -  Niraj Arriaga, PT Physical Therapist PT                  PT Recommendation and Plan     Progress: improving  Outcome Evaluation: Pt agreeable to physical therapy. Performed supine to sit min/CGA, scooting to EOB CGA, sit <->stand RW with CGA and VC for hand placement, amb with RW 25 feet with CGA and increased time with occ VC for safety during turns. Performed B LE ex in sitting LAQ, hip abd, marching and reclined QS, AP and glute sets 1x15 reps with increased time to process VC to perform tasks. Pt with fatigue end of therapy session and edu pt and family importance of performing ex 3-5 days per week. Con't with PT POC and progress as tolerated     Time Calculation:         PT Charges       Row Name 01/25/25 7844             Time Calculation    PT Received On 01/25/25  -CC      PT Goal Re-Cert Due Date 02/03/25  -CC         Time Calculation- PT    Total Timed Code Minutes- PT 38 minute(s)  -CC         Timed Charges    07288 - PT Therapeutic Exercise Minutes 15  -CC      23410 - Gait Training Minutes  15  -CC      16007 - PT Therapeutic Activity Minutes 8  -CC         Total Minutes    Timed Charges Total Minutes 38  -CC       Total Minutes 38  -CC                User Key  (r) = Recorded By, (t) = Taken By, (c) = Cosigned By      Initials Name Provider Type    CC Chery Garcia PTA Physical Therapist Assistant                  Therapy Charges for Today       Code Description Service Date Service Provider Modifiers Qty     87150981626  PT THER PROC EA 15 MIN 1/25/2025 Chery Garcia, PTA GP 1    76716688612 HC GAIT TRAINING EA 15 MIN 1/25/2025 Chery Garcia, PTA GP 1    76376789389 HC PT THERAPEUTIC ACT EA 15 MIN 1/25/2025 Chery Garcia, PTA GP 1            PT G-Codes  Outcome Measure Options: AM-PAC 6 Clicks Basic Mobility (PT)  AM-PAC 6 Clicks Score (PT): 18  AM-PAC 6 Clicks Score (OT): 19       Chery Garcia PTA  1/25/2025

## 2025-01-25 NOTE — PLAN OF CARE
Goal Outcome Evaluation:      Pt very fatigued today. Scott discontinued per order. VSS. POC continues

## 2025-01-25 NOTE — PLAN OF CARE
Goal Outcome Evaluation:  Plan of Care Reviewed With: patient, family        Progress: improving  Outcome Evaluation: Pt agreeable to physical therapy. Performed supine to sit min/CGA, scooting to EOB CGA, sit <->stand RW with CGA and VC for hand placement, amb with RW 25 feet with CGA and increased time with occ VC for safety during turns. Performed B LE ex in sitting LAQ, hip abd, marching and reclined QS, AP and glute sets 1x15 reps with increased time to process VC to perform tasks. Pt with fatigue end of therapy session and edu pt and family importance of performing ex 3-5 days per week. Con't with PT POC and progress as tolerated

## 2025-01-25 NOTE — PROGRESS NOTES
"      [facility]    Name:  Tristan Hillman   Age:  79 y.o.  Sex:  male  :  1945  MRN:  [id number]   Visit Number:  [id number]  Admission Date:  2025  Date Of Service:  25  Primary Care Physician:  Kerri Chew APRN     LOS: 0 days :    Chief Complaint:      Generalized weakness.    Subjective:    Patient seen this morning.  Sitter at bedside.  Patient is hard of hearing.  No fevers noted overnight.  Still feels weak.  He wants to work with therapy today.  Prior provider did arrange for home health and ordered hospital bed.    Hospital Course:    Prior provider: \"Tristan Hillman is a 79-year-old male with history of hypertension, paroxysmal atrial fibrillation on apixaban, parkinsonism on Sinemet was admitted from the emergency room with progressive generalized weakness and fevers of 1 day duration.  Patient also gives history of urinary frequency and urgency as well as incomplete emptying of the bladder.  Patient lives with his wife and uses a walker to ambulate.    In the emergency room, he was initially afebrile and hemodynamically stable saturating at 93% on room air.  Troponin x 2 were unremarkable.  CMP was unremarkable except for a glucose of 140.  CBC was within normal range.  Nasal swab however came back positive for influenza A but negative for COVID.  Chest x-ray was unremarkable.  Patient was initiated on Tamiflu and was planned for discharge but due to significant generalized weakness and inability to walk, he was admitted to the medical floor with telemetry.  In the emergency room, due to urinary retention a Scott catheter was placed.\"    Review of Systems:     All systems were reviewed and negative except as mentioned in subjective, assessment and plan.    Vital Signs:    Temp:  [98 °F (36.7 °C)-98.6 °F (37 °C)] 98 °F (36.7 °C)  Heart Rate:  [73-84] 76  Resp:  [18-22] 18  BP: (105-132)/(55-80) 126/78    Intake and output:    I/O last 3 " completed shifts:  In: 600 [P.O.:600]  Out: 2550 [Urine:2550]  I/O this shift:  In: 480 [P.O.:480]  Out: 400 [Urine:400]    Physical Examination:    General Appearance:  Alert and cooperative.    Head:  Atraumatic and normocephalic.  Left ear hearing aid noted.   Eyes: Conjunctivae and sclerae normal, no icterus. No pallor.   Throat: No oral lesions, no thrush, oral mucosa moist.   Neck: Supple, trachea midline, no thyromegaly.   Lungs:   Breath sounds heard bilaterally equally.  No wheezing or crackles. No Pleural rub or bronchial breathing.   Heart:  Normal S1 and S2, no murmur, no gallop, no rub. No JVD.   Abdomen:   Normal bowel sounds, no masses, no organomegaly. Soft, nontender, nondistended, no rebound tenderness.   Extremities: Supple, no edema, no cyanosis, no clubbing.   Skin: No bleeding or rash.   Neurologic: Alert and oriented x 3. No facial asymmetry weakness   Laboratory results:    Results from last 7 days   Lab Units 01/24/25  0613 01/23/25  0920   SODIUM mmol/L 137 140   POTASSIUM mmol/L 3.8 3.9   CHLORIDE mmol/L 104 101   CO2 mmol/L 21.7* 24.6   BUN mg/dL 17 16   CREATININE mg/dL 0.79 0.96   CALCIUM mg/dL 8.6 9.2   BILIRUBIN mg/dL 0.3 0.4   ALK PHOS U/L 54 64   ALT (SGPT) U/L 10 19   AST (SGOT) U/L 30 22   GLUCOSE mg/dL 109* 140*     Results from last 7 days   Lab Units 01/23/25  0920   WBC 10*3/mm3 7.55   HEMOGLOBIN g/dL 13.9   HEMATOCRIT % 39.7   PLATELETS 10*3/mm3 146       Results from last 7 days   Lab Units 01/23/25  1032 01/23/25  0920   HSTROP T ng/L 14 14       I have reviewed the patient's laboratory results.    Radiology results:    No radiology results from the last 24 hrs  I have reviewed the patient's radiology reports.    Medication Review:     I have reviewed the patient's active and prn medications.     Problem List:      Influenza A    Assessment:    Acute influenza A bronchitis, POA.  Generalized weakness secondary to #1, POA.  Urinary retention secondary to BPH,  POA.  Paroxysmal atrial fibrillation on apixaban.  Essential hypertension.  Parkinsonism on Sinemet.  Hard of hearing.    Plan:    Acute influenza A.  Started on Tamiflu.  No fever noted since early yesterday morning.    BPH/urinary retention.  Scott catheter was removed and is on Flomax and Proscar.  Will continue the Flomax alone.    Paroxysmal atrial fibrillation.  Continue on Eliquis    Parkinsonism/impaired mobility.  - Continue Sinemet.  - Continue physical and occupational therapy.  - Patient will also be arranged home hospital bed.  The patient has a medical condition which requires frequent changes in body positioning in ways not feasibly achieved with an ordinary bed.      Discussed plan of care with patient and son, hopefully home tomorrow.    DVT Prophylaxis: Apixaban  Code Status: Full  Diet: Cardiac  Discharge Plan: Home with home health    Melinda Morgan DO  01/25/25  13:53 EST    Dictated utilizing Dragon dictation.

## 2025-01-25 NOTE — PLAN OF CARE
Problem: Adult Inpatient Plan of Care  Goal: Plan of Care Review  Outcome: Progressing  Goal: Patient-Specific Goal (Individualized)  Outcome: Progressing  Goal: Absence of Hospital-Acquired Illness or Injury  Outcome: Progressing  Intervention: Identify and Manage Fall Risk  Recent Flowsheet Documentation  Taken 1/25/2025 0200 by Lynne Guzman RN  Safety Promotion/Fall Prevention:   activity supervised   assistive device/personal items within reach   clutter free environment maintained   safety round/check completed  Taken 1/25/2025 0000 by Lynne Guzman RN  Safety Promotion/Fall Prevention:   activity supervised   assistive device/personal items within reach   clutter free environment maintained   safety round/check completed  Taken 1/24/2025 2200 by Lynne Guzman RN  Safety Promotion/Fall Prevention:   activity supervised   assistive device/personal items within reach   clutter free environment maintained   safety round/check completed  Taken 1/24/2025 2045 by Lynne Guzman RN  Safety Promotion/Fall Prevention:   activity supervised   assistive device/personal items within reach   clutter free environment maintained   safety round/check completed  Intervention: Prevent Skin Injury  Recent Flowsheet Documentation  Taken 1/25/2025 0200 by Lynne Guzman RN  Body Position:   turned   supine   legs elevated  Taken 1/25/2025 0000 by Lynne Guzman RN  Body Position:   tilted   left  Taken 1/24/2025 2200 by Lynne Guzman RN  Body Position:   supine   legs elevated  Taken 1/24/2025 2045 by Lynne Guzman RN  Body Position:   tilted   left  Skin Protection: incontinence pads utilized  Intervention: Prevent Infection  Recent Flowsheet Documentation  Taken 1/25/2025 0200 by Lynne Guzman RN  Infection Prevention:   environmental surveillance performed   rest/sleep promoted  Taken 1/25/2025 0000 by Lynne Guzman RN  Infection Prevention:   environmental surveillance  performed   rest/sleep promoted  Taken 1/24/2025 2200 by Lynne Guzman RN  Infection Prevention:   environmental surveillance performed   rest/sleep promoted  Taken 1/24/2025 2045 by Lynne Guzman RN  Infection Prevention: environmental surveillance performed  Goal: Optimal Comfort and Wellbeing  Outcome: Progressing  Goal: Readiness for Transition of Care  Outcome: Progressing     Problem: Skin Injury Risk Increased  Goal: Skin Health and Integrity  Outcome: Progressing  Intervention: Optimize Skin Protection  Recent Flowsheet Documentation  Taken 1/25/2025 0200 by Lynne Guzman RN  Activity Management: activity minimized  Head of Bed (HOB) Positioning: HOB elevated  Taken 1/25/2025 0000 by Lynne Guzman RN  Activity Management: activity minimized  Head of Bed (HOB) Positioning: HOB elevated  Taken 1/24/2025 2200 by Lynne Guzman RN  Activity Management: activity minimized  Head of Bed (HOB) Positioning: HOB elevated  Taken 1/24/2025 2045 by Lynne Guzman RN  Activity Management: activity encouraged  Pressure Reduction Techniques: frequent weight shift encouraged  Head of Bed (HOB) Positioning: HOB elevated  Pressure Reduction Devices: pressure-redistributing mattress utilized  Skin Protection: incontinence pads utilized     Problem: Fatigue  Goal: Improved Activity Tolerance  Outcome: Progressing  Intervention: Promote Improved Energy  Recent Flowsheet Documentation  Taken 1/25/2025 0200 by Lynne Guzman RN  Activity Management: activity minimized  Taken 1/25/2025 0000 by Lynne Guzman RN  Activity Management: activity minimized  Taken 1/24/2025 2200 by Lynne Guzman RN  Activity Management: activity minimized  Taken 1/24/2025 2045 by Lynne Guzman RN  Activity Management: activity encouraged     Problem: Hypertension Acute  Goal: Blood Pressure Within Desired Range  Outcome: Progressing  Intervention: Normalize Blood Pressure  Recent Flowsheet Documentation  Taken  1/24/2025 2045 by Lynne Guzman RN  Medication Review/Management: medications reviewed     Problem: Fall Injury Risk  Goal: Absence of Fall and Fall-Related Injury  Outcome: Progressing  Intervention: Identify and Manage Contributors  Recent Flowsheet Documentation  Taken 1/24/2025 2045 by Lynne Guzman RN  Medication Review/Management: medications reviewed  Intervention: Promote Injury-Free Environment  Recent Flowsheet Documentation  Taken 1/25/2025 0200 by Lynne Guzman RN  Safety Promotion/Fall Prevention:   activity supervised   assistive device/personal items within reach   clutter free environment maintained   safety round/check completed  Taken 1/25/2025 0000 by Lynne Guzman RN  Safety Promotion/Fall Prevention:   activity supervised   assistive device/personal items within reach   clutter free environment maintained   safety round/check completed  Taken 1/24/2025 2200 by Lynne Guzman RN  Safety Promotion/Fall Prevention:   activity supervised   assistive device/personal items within reach   clutter free environment maintained   safety round/check completed  Taken 1/24/2025 2045 by Lynne Guzman RN  Safety Promotion/Fall Prevention:   activity supervised   assistive device/personal items within reach   clutter free environment maintained   safety round/check completed   Goal Outcome Evaluation:      Plan of care reviewed with patient. Patient remains confused. Family has been at bedside throughout the night. No acute events noted this shift.

## 2025-01-26 ENCOUNTER — READMISSION MANAGEMENT (OUTPATIENT)
Dept: CALL CENTER | Facility: HOSPITAL | Age: 80
End: 2025-01-26
Payer: MEDICARE

## 2025-01-26 VITALS
RESPIRATION RATE: 18 BRPM | TEMPERATURE: 97.7 F | OXYGEN SATURATION: 93 % | BODY MASS INDEX: 30.37 KG/M2 | SYSTOLIC BLOOD PRESSURE: 125 MMHG | HEART RATE: 78 BPM | WEIGHT: 216.93 LBS | HEIGHT: 71 IN | DIASTOLIC BLOOD PRESSURE: 53 MMHG

## 2025-01-26 LAB
ANION GAP SERPL CALCULATED.3IONS-SCNC: 9.2 MMOL/L (ref 5–15)
BUN SERPL-MCNC: 20 MG/DL (ref 8–23)
BUN/CREAT SERPL: 26 (ref 7–25)
CALCIUM SPEC-SCNC: 9 MG/DL (ref 8.6–10.5)
CHLORIDE SERPL-SCNC: 102 MMOL/L (ref 98–107)
CO2 SERPL-SCNC: 25.8 MMOL/L (ref 22–29)
CREAT SERPL-MCNC: 0.77 MG/DL (ref 0.76–1.27)
EGFRCR SERPLBLD CKD-EPI 2021: 92.2 ML/MIN/1.73
GLUCOSE SERPL-MCNC: 106 MG/DL (ref 65–99)
POTASSIUM SERPL-SCNC: 3.8 MMOL/L (ref 3.5–5.2)
SODIUM SERPL-SCNC: 137 MMOL/L (ref 136–145)

## 2025-01-26 PROCEDURE — 80048 BASIC METABOLIC PNL TOTAL CA: CPT | Performed by: FAMILY MEDICINE

## 2025-01-26 PROCEDURE — G0378 HOSPITAL OBSERVATION PER HR: HCPCS

## 2025-01-26 PROCEDURE — 99238 HOSP IP/OBS DSCHRG MGMT 30/<: CPT | Performed by: FAMILY MEDICINE

## 2025-01-26 RX ORDER — POTASSIUM CHLORIDE 750 MG/1
40 CAPSULE, EXTENDED RELEASE ORAL ONCE
Status: COMPLETED | OUTPATIENT
Start: 2025-01-26 | End: 2025-01-26

## 2025-01-26 RX ORDER — TAMSULOSIN HYDROCHLORIDE 0.4 MG/1
0.4 CAPSULE ORAL DAILY
Qty: 30 CAPSULE | Refills: 0 | Status: SHIPPED | OUTPATIENT
Start: 2025-01-27 | End: 2025-02-26

## 2025-01-26 RX ORDER — LISINOPRIL AND HYDROCHLOROTHIAZIDE 12.5; 2 MG/1; MG/1
1 TABLET ORAL DAILY
Start: 2025-01-29

## 2025-01-26 RX ADMIN — CARBIDOPA AND LEVODOPA 1 TABLET: 25; 100 TABLET, EXTENDED RELEASE ORAL at 08:37

## 2025-01-26 RX ADMIN — Medication 10 ML: at 08:41

## 2025-01-26 RX ADMIN — POTASSIUM CHLORIDE 40 MEQ: 750 CAPSULE, EXTENDED RELEASE ORAL at 11:03

## 2025-01-26 RX ADMIN — B-COMPLEX W/ C & FOLIC ACID TAB 1 TABLET: TAB at 08:37

## 2025-01-26 RX ADMIN — OSELTAMIVIR PHOSPHATE 75 MG: 75 CAPSULE ORAL at 08:37

## 2025-01-26 RX ADMIN — ESCITALOPRAM OXALATE 10 MG: 10 TABLET ORAL at 08:37

## 2025-01-26 RX ADMIN — APIXABAN 5 MG: 5 TABLET, FILM COATED ORAL at 08:37

## 2025-01-26 RX ADMIN — PANTOPRAZOLE SODIUM 40 MG: 40 TABLET, DELAYED RELEASE ORAL at 06:04

## 2025-01-26 RX ADMIN — LISINOPRIL 20 MG: 20 TABLET ORAL at 08:37

## 2025-01-26 RX ADMIN — ROSUVASTATIN CALCIUM 5 MG: 5 TABLET, FILM COATED ORAL at 08:37

## 2025-01-26 RX ADMIN — ASPIRIN 81 MG CHEWABLE TABLET 81 MG: 81 TABLET CHEWABLE at 08:37

## 2025-01-26 RX ADMIN — TAMSULOSIN HYDROCHLORIDE 0.4 MG: 0.4 CAPSULE ORAL at 08:37

## 2025-01-26 RX ADMIN — CARIPRAZINE 1.5 MG: 1.5 CAPSULE, GELATIN COATED ORAL at 08:37

## 2025-01-26 NOTE — PLAN OF CARE
Goal Outcome Evaluation:  Plan of Care Reviewed With: patient        Progress: no change     Pt ready for dc per Dr. Morgan.

## 2025-01-26 NOTE — OUTREACH NOTE
Prep Survey      Flowsheet Row Responses   [facility] facility patient discharged from? [facility]   Is LACE score < 7 ? Yes   Eligibility Not Eligible   What are the reasons patient is not eligible? [facility]   Does the patient have one of the following disease processes/diagnoses(primary or secondary)? Other   Prep survey completed? Yes            ROBERT OWENS - Registered Nurse

## 2025-01-26 NOTE — PLAN OF CARE
Problem: Adult Inpatient Plan of Care  Goal: Plan of Care Review  Outcome: Progressing  Goal: Patient-Specific Goal (Individualized)  Outcome: Progressing  Goal: Absence of Hospital-Acquired Illness or Injury  Outcome: Progressing  Intervention: Identify and Manage Fall Risk  Recent Flowsheet Documentation  Taken 1/26/2025 0000 by Lynne Guzman RN  Safety Promotion/Fall Prevention:   activity supervised   assistive device/personal items within reach   clutter free environment maintained   safety round/check completed  Taken 1/25/2025 2130 by Lynne Guzman RN  Safety Promotion/Fall Prevention:   activity supervised   assistive device/personal items within reach   clutter free environment maintained   toileting scheduled  Taken 1/25/2025 2000 by Lynne Guzman RN  Safety Promotion/Fall Prevention:   activity supervised   assistive device/personal items within reach   clutter free environment maintained   safety round/check completed  Intervention: Prevent Skin Injury  Recent Flowsheet Documentation  Taken 1/26/2025 0000 by Lynne Guzman RN  Body Position:   position changed independently   supine   legs elevated  Taken 1/25/2025 2130 by Lynne Guzman RN  Body Position:   position changed independently   tilted   left  Skin Protection: incontinence pads utilized  Taken 1/25/2025 2000 by Lynne Guzman RN  Body Position:   position changed independently   sitting up in bed  Intervention: Prevent Infection  Recent Flowsheet Documentation  Taken 1/26/2025 0000 by Lynne Guzman RN  Infection Prevention:   environmental surveillance performed   rest/sleep promoted  Taken 1/25/2025 2130 by Lynne Guzman RN  Infection Prevention: equipment surfaces disinfected  Taken 1/25/2025 2000 by Lynne Guzman RN  Infection Prevention: environmental surveillance performed  Goal: Optimal Comfort and Wellbeing  Outcome: Progressing  Intervention: Provide Person-Centered Care  Recent Flowsheet  Documentation  Taken 1/25/2025 2130 by Lynne Guzman RN  Trust Relationship/Rapport:   care explained   choices provided   emotional support provided  Goal: Readiness for Transition of Care  Outcome: Progressing     Problem: Skin Injury Risk Increased  Goal: Skin Health and Integrity  Outcome: Progressing  Intervention: Optimize Skin Protection  Recent Flowsheet Documentation  Taken 1/26/2025 0000 by Lynne Guzman RN  Activity Management: activity minimized  Head of Bed (HOB) Positioning: HOB elevated  Taken 1/25/2025 2130 by Lynne Guzman RN  Activity Management: activity encouraged  Pressure Reduction Techniques:   frequent weight shift encouraged   heels elevated off bed  Head of Bed (HOB) Positioning: HOB elevated  Pressure Reduction Devices: pressure-redistributing mattress utilized  Skin Protection: incontinence pads utilized  Taken 1/25/2025 2000 by Lynne Guzman RN  Activity Management: activity encouraged  Head of Bed (HOB) Positioning: HOB elevated     Problem: Fatigue  Goal: Improved Activity Tolerance  Outcome: Progressing  Intervention: Promote Improved Energy  Recent Flowsheet Documentation  Taken 1/26/2025 0000 by Lynne Guzman RN  Activity Management: activity minimized  Taken 1/25/2025 2130 by Lynne Guzman RN  Activity Management: activity encouraged  Taken 1/25/2025 2000 by Lynne Guzman RN  Activity Management: activity encouraged     Problem: Hypertension Acute  Goal: Blood Pressure Within Desired Range  Outcome: Progressing  Intervention: Normalize Blood Pressure  Recent Flowsheet Documentation  Taken 1/25/2025 2000 by Lynne Guzman RN  Medication Review/Management: medications reviewed     Problem: Fall Injury Risk  Goal: Absence of Fall and Fall-Related Injury  Outcome: Progressing  Intervention: Identify and Manage Contributors  Recent Flowsheet Documentation  Taken 1/25/2025 2000 by Lynne Guzman RN  Medication Review/Management: medications  reviewed  Intervention: Promote Injury-Free Environment  Recent Flowsheet Documentation  Taken 1/26/2025 0000 by Lynne Guzman RN  Safety Promotion/Fall Prevention:   activity supervised   assistive device/personal items within reach   clutter free environment maintained   safety round/check completed  Taken 1/25/2025 2130 by Lynne Guzman RN  Safety Promotion/Fall Prevention:   activity supervised   assistive device/personal items within reach   clutter free environment maintained   toileting scheduled  Taken 1/25/2025 2000 by Lynne Guzman RN  Safety Promotion/Fall Prevention:   activity supervised   assistive device/personal items within reach   clutter free environment maintained   safety round/check completed   Goal Outcome Evaluation:         Plan of care reviewed with patient and family. No acute changes noted this shift. Patient has rested well between care tonight.

## 2025-01-26 NOTE — CASE MANAGEMENT/SOCIAL WORK
Case Management Discharge Note      Final Note: Plans for STR then home with wife    Provided Post Acute Provider List?: Yes  Post Acute Provider List: Nursing Home  Provided Post Acute Provider Quality & Resource List?: Yes  Post Acute Provider Quality and Resource List: Nursing Home  Delivered To: Patient, Support Person  Support Person: Pricila Sandra, Sharda  Method of Delivery: In person    Selected Continued Care - Admitted Since 1/23/2025       Destination    No services have been selected for the patient.                Durable Medical Equipment    No services have been selected for the patient.                Dialysis/Infusion    No services have been selected for the patient.                Home Medical Care    No services have been selected for the patient.                Therapy    No services have been selected for the patient.                Community Resources    No services have been selected for the patient.                Community & DME    No services have been selected for the patient.                    Transportation Services  Private: Car    Final Discharge Disposition Code: 06 - home with home health care

## 2025-01-26 NOTE — PLAN OF CARE
Goal Outcome Evaluation:  Plan of Care Reviewed With: patient        Progress: no change     VSS. Pt ambulated to bathroom during shift.

## 2025-01-26 NOTE — DISCHARGE SUMMARY
[facility]   [facility]      Name:  Tristan Hillman   Age:  79 y.o.  Sex:  male  :  1945  MRN:  [id number]   Visit Number:  [id number]    Admission Date:  2025  Date of Discharge:  2025  Primary Care Physician:  Kerri Chew APRN    Important issues to note:    Patient started on Flomax, arrange follow-up with Dr. Carey  PCP follow-up 1 week  Instructed to hold lisinopril/HCTZ until blood pressure consistently greater than 1 30-1 40 range at home    Discharge Diagnoses:     Acute influenza A bronchitis, POA.  Generalized weakness secondary to #1, POA.  Urinary retention secondary to BPH, POA.  Paroxysmal atrial fibrillation on apixaban.  Essential hypertension.  Parkinsonism on Sinemet.  Hard of hearing.    Problem List:     Active Hospital Problems    Diagnosis  POA    **Influenza A [J10.1]  Yes      Resolved Hospital Problems   No resolved problems to display.     Presenting Problem:    Chief Complaint   Patient presents with    Weakness - Generalized     Pt reports worsening weakness over the last month but got so bad this morning he was unable to use his walker. Pt also reports SOA and cough. Pt was given 4mg zofran in route because of nausea.       Consults:     Consulting Physician(s)                     None              Procedures Performed:        History of presenting illness/Hospital Course:    79-year-old history of hypertension, paroxysmal atrial fibrillation on apixaban, advanced Parkinson's, who presented to the hospital with significant weakness, fever.  Had had urinary frequency and incomplete emptying of the bladder.  Typically uses a walker at baseline.    Workup in the emergency room he was largely hemodynamically stable.  He was positive for influenza A.  Chest x-ray was unremarkable.  He did have significant weakness and was admitted for observation.  He also had urinary retention and a Scott catheter in place.    Fortunately patient was  improving.  Fever did trend down and has had no fever now for the last 36 hours.  His Scott catheter was removed and he has been voiding.  He has been started on Flomax which will be continued upon discharge.  Patient will need outpatient follow-up with urology as well as PCP.    I instructed patient to hold his lisinopril and HCTZ until systolic blood pressures in the 1 30-1 40 range consistently.  Encourage oral intake.  Home health services and a hospital bed have been ordered.    The patient has a medical condition which requires frequent changes in body positioning in ways not feasibly achieved with an ordinary bed.      Vital Signs:    Temp:  [97.8 °F (36.6 °C)-98.6 °F (37 °C)] 98.2 °F (36.8 °C)  Heart Rate:  [70-85] 70  Resp:  [18] 18  BP: (106-147)/(62-90) 129/62    Physical Exam:    General Appearance:  Alert and cooperative.  NAD   Head:  Atraumatic and normocephalic.   Eyes: Conjunctivae and sclerae normal, no icterus. No pallor.   Ears:  Ears with no abnormalities noted.   Throat: No oral lesions, no thrush, oral mucosa moist.   Neck: Supple, trachea midline, no thyromegaly.   Back:   No kyphoscoliosis present. No tenderness to palpation.   Lungs:   Breath sounds heard bilaterally equally.  No crackles or wheezing. No Pleural rub or bronchial breathing.   Heart:  Normal S1 and S2, no murmur, no gallop, no rub. No JVD.   Abdomen:   Normal bowel sounds, no masses, no organomegaly. Soft, nontender, nondistended, no rebound tenderness.   Extremities: Supple, no edema, no cyanosis, no clubbing.   Pulses: Pulses palpable bilaterally.   Skin: No bleeding or rash.   Neurologic: Alert and oriented x 3. No facial asymmetry. Moves all four limbs. No tremors.     Pertinent Lab Results:     Results from last 7 days   Lab Units 01/26/25  0610 01/24/25  0613 01/23/25  0920   SODIUM mmol/L 137 137 140   POTASSIUM mmol/L 3.8 3.8 3.9   CHLORIDE mmol/L 102 104 101   CO2 mmol/L 25.8 21.7* 24.6   BUN mg/dL 20 17 16    CREATININE mg/dL 0.77 0.79 0.96   CALCIUM mg/dL 9.0 8.6 9.2   BILIRUBIN mg/dL  --  0.3 0.4   ALK PHOS U/L  --  54 64   ALT (SGPT) U/L  --  10 19   AST (SGOT) U/L  --  30 22   GLUCOSE mg/dL 106* 109* 140*     Results from last 7 days   Lab Units 01/23/25  0920   WBC 10*3/mm3 7.55   HEMOGLOBIN g/dL 13.9   HEMATOCRIT % 39.7   PLATELETS 10*3/mm3 146         Results from last 7 days   Lab Units 01/23/25  1032 01/23/25  0920   HSTROP T ng/L 14 14                           Pertinent Radiology Results:    Imaging Results (All)       Procedure Component Value Units Date/Time    XR Chest 1 View [[id number]] Collected: 01/23/25 0958     Updated: 01/23/25 1000    Narrative:      PROCEDURE: XR CHEST 1 VW-     HISTORY: Weak/Dizzy/AMS triage protocol, coronary artery disease     COMPARISON:  None     FINDINGS:  Portable view of the chest demonstrates the lungs to be  grossly clear. There is no evidence of effusion, pneumothorax or other  significant pleural disease. The mediastinum is unremarkable.     The heart size is normal.       Impression:      Unremarkable portable chest.            This report was signed and finalized on 1/23/2025 9:58 AM by Jf Murray MD.               Echo:    Results for orders placed during the hospital encounter of 01/31/24    Adult Transthoracic Echo Complete W/ Cont if Necessary Per Protocol    Interpretation Summary    Left ventricular systolic function is normal. Calculated left ventricular EF = 62%    The right ventricular cavity is dilated.    The left atrial cavity is dilated.    Left atrial volume is mildly increased.    The right atrial cavity is borderline dilated.    Estimated right ventricular systolic pressure from tricuspid regurgitation is normal (<35 mmHg). Calculated right ventricular systolic pressure from tricuspid regurgitation is 31 mmHg.    Condition on Discharge:      Stable.    Code status during the hospital stay:    Code Status and Medical Interventions: CPR (Attempt to  Resuscitate); Full Support   Ordered at: 01/23/25 1242     Code Status (Patient has no pulse and is not breathing):    CPR (Attempt to Resuscitate)     Medical Interventions (Patient has pulse or is breathing):    Full Support     Discharge Disposition:    Home or Self Care    Discharge Medications:       Discharge Medications        New Medications        Instructions Start Date   tamsulosin 0.4 MG capsule 24 hr capsule  Commonly known as: FLOMAX   0.4 mg, Oral, Daily   Start Date: January 27, 2025            Changes to Medications        Instructions Start Date   lisinopril-hydrochlorothiazide 20-12.5 MG per tablet  Commonly known as: Zestoretic  What changed: These instructions start on January 29, 2025. If you are unsure what to do until then, ask your doctor or other care provider.   1 tablet, Oral, Daily   Start Date: January 29, 2025            Continue These Medications        Instructions Start Date   ALPRAZolam 0.5 MG tablet  Commonly known as: XANAX   TAKE ONE (1) TABLET BY MOUTH EVERY NIGHT AS NEEDED FOR SLEEP/ANXIETY      aspirin 81 MG tablet   81 mg, Oral, Daily      carbidopa-levodopa ER  MG per tablet  Commonly known as: SINEMET CR   1 tablet, Oral, 2 Times Daily      coenzyme Q10 100 MG capsule   100 mg, Daily      Eliquis 5 MG tablet tablet  Generic drug: apixaban   5 mg, Oral, 2 Times Daily      escitalopram 10 MG tablet  Commonly known as: LEXAPRO   10 mg, Oral, Daily      multivitamin tablet tablet   1 tablet, Daily      nitroglycerin 0.4 MG SL tablet  Commonly known as: NITROSTAT   place 1 tablet under the tongue if needed every 5 minutes fo      nystatin 333556 UNIT/GM powder  Commonly known as: MYCOSTATIN   Topical, 3 Times Daily      omeprazole 20 MG capsule  Commonly known as: priLOSEC   TAKE 1 CAPSULE BY MOUTH DAILY - MUST HAVE APPOINTMENT FOR FURTHER REFILLS      rosuvastatin 5 MG tablet  Commonly known as: CRESTOR   5 mg, Oral, Daily             Stop These Medications       Cariprazine HCl 1.5 MG capsule capsule  Commonly known as: Vraylar            Discharge Diet:   As tolerated    Activity at Discharge:       Follow-up Appointments:    Additional Instructions for the Follow-ups that You Need to Schedule       Ambulatory Referral to Home Health (Hospital)   As directed      Face to Face Visit Date: 1/24/2025   Follow-up provider for Plan of Care?: I treated the patient in an acute care facility and will not continue treatment after discharge.   Follow-up provider: MARY DENNISON [[id number]]   Reason/Clinical Findings: Influenza A, generalized weakness, Parkinson's, A-fib, hypertension   Describe mobility limitations that make leaving home difficult: Generalized weakness, fall risk   Nursing/Therapeutic Services Requested: Skilled Nursing Physical Therapy Occupational Therapy   Skilled nursing orders: Medication education Cardiopulmonary assessments   PT orders: Transfer training Strengthening Gait Training Therapeutic exercise Home safety assessment   Weight Bearing Status: As Tolerated   Occupational orders: Activities of daily living Strengthening Fine motor   Frequency: 1 Week 1               Follow-up Information       Chacorta Carey MD Follow up in 6 week(s).    Specialty: Urology  Contact information:  [address] [place] [address] [phone]               Mary Dennison APRN Follow up in 1 week(s).    Specialties: Nurse Practitioner, Family Medicine, Hospitalist  Contact information:  [address]  [facility] [id number] [phone]                           Future Appointments   Date Time Provider Department Center   2/25/2025  1:15 PM Katheryn Craven APRN [facility]   3/31/2025  1:30 PM Mary Dennison APRN [facility]   6/17/2025  1:00 PM Kishore Edouard MD [facility]     Test Results Pending at Discharge:    Pending Results       None                 Melinda Morgan DO  01/26/25  10:42 EST    Time: I  spent 18 minutes on this discharge activity which included: face-to-face encounter with the patient, reviewing the data in the system, coordination of the care with the nursing staff as well as consultants, documentation, and entering orders.     Dictated utilizing Dragon dictation.

## 2025-01-27 ENCOUNTER — TELEPHONE (OUTPATIENT)
Dept: FAMILY MEDICINE CLINIC | Facility: CLINIC | Age: 80
End: 2025-01-27
Payer: MEDICARE

## 2025-01-27 NOTE — TELEPHONE ENCOUNTER
Called Alayna to discuss this message. Advised her that since patient had not yet established care with Neurology, [facility] did not have a verbal release on file for Neuro. Due to verbal releases being office specific, they could not use the verbal release from  office to speak with her to schedule his Neuro consultation. She voiced understanding and asked me to assist them with scheduling this appointment and she would be OK with receiving a MyChart notification once the appointment is made. She states that first available appointment in the afternoon is fine. I also discussed with her to be sure to have him add her to his verbal release with Neuro once he arrives for his initial visit so that they could discuss with her with his future appointments, results, etc.

## 2025-01-27 NOTE — TELEPHONE ENCOUNTER
Caller: Alayna Onofre    Relationship: Emergency Contact    Best call back number: [phone]    What specialty or service is being requested:     NEUROLOGY    Any additional details:     OUR OFFICE HAS CALLED LEFT MESSAGES AND SENT LETTERS BUT WILL NOT SPEAK WITH ALAYNA TO MAKE APPOINTMENT AND SHE IS NOT SURE WHY BECAUSE SHE IS ON VERBAL.  PLEASE CALL TO DISCUSS

## 2025-01-29 ENCOUNTER — OFFICE VISIT (OUTPATIENT)
Dept: FAMILY MEDICINE CLINIC | Facility: CLINIC | Age: 80
End: 2025-01-29
Payer: MEDICARE

## 2025-01-29 VITALS
OXYGEN SATURATION: 97 % | HEIGHT: 71 IN | DIASTOLIC BLOOD PRESSURE: 80 MMHG | SYSTOLIC BLOOD PRESSURE: 140 MMHG | BODY MASS INDEX: 30.94 KG/M2 | WEIGHT: 221 LBS | HEART RATE: 60 BPM | TEMPERATURE: 97 F

## 2025-01-29 DIAGNOSIS — Z09 HOSPITAL DISCHARGE FOLLOW-UP: Primary | ICD-10-CM

## 2025-01-29 DIAGNOSIS — J11.1 FLU: ICD-10-CM

## 2025-01-29 DIAGNOSIS — F39 MOOD DISORDER: ICD-10-CM

## 2025-01-29 DIAGNOSIS — F41.1 GENERALIZED ANXIETY DISORDER: ICD-10-CM

## 2025-01-29 DIAGNOSIS — F33.0 MILD EPISODE OF RECURRENT MAJOR DEPRESSIVE DISORDER: ICD-10-CM

## 2025-01-29 DIAGNOSIS — R33.9 URINARY RETENTION: ICD-10-CM

## 2025-01-29 LAB
EXPIRATION DATE: ABNORMAL
FLUAV AG UPPER RESP QL IA.RAPID: NOT DETECTED
FLUBV AG UPPER RESP QL IA.RAPID: NOT DETECTED
INTERNAL CONTROL: ABNORMAL
Lab: ABNORMAL
SARS-COV-2 AG UPPER RESP QL IA.RAPID: NOT DETECTED

## 2025-01-29 RX ORDER — CIPROFLOXACIN AND DEXAMETHASONE 3; 1 MG/ML; MG/ML
4 SUSPENSION/ DROPS AURICULAR (OTIC) AS NEEDED
COMMUNITY
Start: 2025-01-17

## 2025-01-29 NOTE — PROGRESS NOTES
Transitional Care Follow Up Visit  Subjective     Tristan Hillman is a 79 y.o. male who presents for a transitional care management visit.    Within 48 business hours after discharge our office contacted him via telephone to coordinate his care and needs.      I reviewed and discussed the details of that call along with the discharge summary, hospital problems, inpatient lab results, inpatient diagnostic studies, and consultation reports with Tristan.     Current outpatient and discharge medications have been reconciled for the patient.  Reviewed by: LOUISE Grissom          2/24/2020     2:00 PM   Date of TCM Phone Call   [facility]   Date of Admission 2/19/2020   Date of Discharge 2/21/2020   Discharge Disposition Home or Self Care     Risk for Readmission (LACE) Score: 4 (1/26/2025  6:00 AM)      History of Present Illness   Still having nocturia, increase urinary frequency and urgency that will lead to incontience episodes at times  Denies constipation   Getting strength back         Course During Hospital Stay:    Patient started on Flomax, arrange follow-up with Dr. Carey  PCP follow-up 1 week  Instructed to hold lisinopril/HCTZ until blood pressure consistently greater than 1 30-1 40 range at home     Discharge Diagnoses:      Acute influenza A bronchitis, POA.  Generalized weakness secondary to #1, POA.  Urinary retention secondary to BPH, POA.  Paroxysmal atrial fibrillation on apixaban.  Essential hypertension.  Parkinsonism on Sinemet.  Hard of hearing.     Problem List:            Active Hospital Problems     Diagnosis   POA    **Influenza A [J10.1]   Yes       Resolved Hospital Problems   No resolved problems to display.      Presenting Problem:          Chief Complaint   Patient presents with    Weakness - Generalized       Pt reports worsening weakness over the last month but got so bad this morning he was unable to use his walker. Pt also reports SOA and cough. Pt  "was given 4mg zofran in route because of nausea.                  Consults:      Consulting Physician(s)                                      None                     Procedures Performed:           History of presenting illness/Hospital Course:     79-year-old history of hypertension, paroxysmal atrial fibrillation on apixaban, advanced Parkinson's, who presented to the hospital with significant weakness, fever.  Had had urinary frequency and incomplete emptying of the bladder.  Typically uses a walker at baseline.     Workup in the emergency room he was largely hemodynamically stable.  He was positive for influenza A.  Chest x-ray was unremarkable.  He did have significant weakness and was admitted for observation.  He also had urinary retention and a Scott catheter in place.     Fortunately patient was improving.  Fever did trend down and has had no fever now for the last 36 hours.  His Scott catheter was removed and he has been voiding.  He has been started on Flomax which will be continued upon discharge.  Patient will need outpatient follow-up with urology as well as PCP.     I instructed patient to hold his lisinopril and HCTZ until systolic blood pressures in the 1 30-1 40 range consistently.  Encourage oral intake.  Home health services and a hospital bed have been ordered.     The patient has a medical condition which requires frequent changes in body positioning in ways not feasibly achieved with an ordinary bed.     The following portions of the patient's history were reviewed and updated as appropriate: allergies, current medications, past family history, past medical history, past social history, past surgical history, and problem list.    Review of Systems  Gen- No fevers, chills  CV- No chest pain, palpitations  Resp- No cough, dyspnea  GI- No N/V/D, abd pain  Neuro-No dizziness, headaches    Objective   /80   Pulse 60   Temp 97 °F (36.1 °C)   Ht 180.3 cm (71\")   Wt 100 kg (221 lb)   SpO2 " 97%   BMI 30.82 kg/m²   Physical Exam  Vitals and nursing note reviewed.   Constitutional:       Appearance: He is well-developed.   HENT:      Head: Normocephalic and atraumatic.   Eyes:      Pupils: Pupils are equal, round, and reactive to light.   Cardiovascular:      Rate and Rhythm: Normal rate and regular rhythm.      Heart sounds: Normal heart sounds.   Pulmonary:      Effort: Pulmonary effort is normal.      Breath sounds: Normal breath sounds.   Abdominal:      General: Bowel sounds are normal. There is no distension.      Palpations: Abdomen is soft.      Tenderness: There is no abdominal tenderness.   Musculoskeletal:      Cervical back: Neck supple.   Skin:     General: Skin is warm and dry.   Neurological:      General: No focal deficit present.      Mental Status: He is alert and oriented to person, place, and time.   Psychiatric:         Mood and Affect: Mood normal.         Behavior: Behavior normal.         Assessment & Plan   Diagnoses and all orders for this visit:    1. Hospital discharge follow-up (Primary)    2. Flu  -     POCT SARS-CoV-2 Antigen DAWSON + Flu    3. Urinary retention  -     Ambulatory Referral to Urology    4. Mild episode of recurrent major depressive disorder  -     Cariprazine HCl (Vraylar) 1.5 MG capsule capsule; Take 1 capsule by mouth Daily.  Dispense: 30 capsule; Refill: 2    5. Mood disorder  -     Cariprazine HCl (Vraylar) 1.5 MG capsule capsule; Take 1 capsule by mouth Daily.  Dispense: 30 capsule; Refill: 2    6. Generalized anxiety disorder

## 2025-02-04 ENCOUNTER — OFFICE VISIT (OUTPATIENT)
Dept: NEUROLOGY | Facility: CLINIC | Age: 80
End: 2025-02-04
Payer: MEDICARE

## 2025-02-04 ENCOUNTER — TELEPHONE (OUTPATIENT)
Dept: NEUROLOGY | Facility: CLINIC | Age: 80
End: 2025-02-04

## 2025-02-04 VITALS
WEIGHT: 221 LBS | HEIGHT: 71 IN | OXYGEN SATURATION: 97 % | TEMPERATURE: 97.8 F | SYSTOLIC BLOOD PRESSURE: 140 MMHG | BODY MASS INDEX: 30.94 KG/M2 | HEART RATE: 68 BPM | DIASTOLIC BLOOD PRESSURE: 80 MMHG

## 2025-02-04 DIAGNOSIS — G20.C PARKINSONISM, UNSPECIFIED PARKINSONISM TYPE: Primary | ICD-10-CM

## 2025-02-04 PROCEDURE — 99214 OFFICE O/P EST MOD 30 MIN: CPT | Performed by: NURSE PRACTITIONER

## 2025-02-04 PROCEDURE — 3077F SYST BP >= 140 MM HG: CPT | Performed by: NURSE PRACTITIONER

## 2025-02-04 PROCEDURE — 1160F RVW MEDS BY RX/DR IN RCRD: CPT | Performed by: NURSE PRACTITIONER

## 2025-02-04 PROCEDURE — 1159F MED LIST DOCD IN RCRD: CPT | Performed by: NURSE PRACTITIONER

## 2025-02-04 PROCEDURE — 3079F DIAST BP 80-89 MM HG: CPT | Performed by: NURSE PRACTITIONER

## 2025-02-04 RX ORDER — CARBIDOPA AND LEVODOPA 25; 100 MG/1; MG/1
1 TABLET ORAL 3 TIMES DAILY
Qty: 90 TABLET | Refills: 1 | Status: SHIPPED | OUTPATIENT
Start: 2025-02-04

## 2025-02-04 NOTE — PROGRESS NOTES
New Patient Office Visit      Patient Name: Tristan Hillman  : 1945   MRN: [id number]     Referring Physician: Kerri Chew APRN    Chief Complaint:    Chief Complaint   Patient presents with    Establish Care     Tremors BLE  and Bilateral hands at times       History of Present Illness: Tristan Hillman is a 79 y.o. male who is here today to establish care with Neurology.  To the office visit today by his daughter Pricila.  He reports having tremors in his legs.  His daughter feels that he has been having them in his hands as well.  Patient states when he is walking this is when he notices the tremors in the leg.  He has been having difficulty going from sitting to standing.  He does get strangled at times with eating and drinking.  He denies any drooling or diplopia, no patient.  He says he is sleeping okay once he gets to sleep as he has insomnia and Xanax helps him with this.  They deny any falls recently.  The last 1 was 5 to 6 months ago.  He uses a rollator walker.  He denies any hallucinations.  He does have retropulsion.  He has difficulty turning in the bed and getting up from the bed.  He has a bed rail that they have put on his bed and this does help some.  His daughter says that his voice is lower/softer than it used to be he started carbidopa levodopa 1 month ago has been taking this once or twice a day.  He is unsure if it is helping or not.  Denies any hallucinations.  He does have dizziness and has been worked up by cardiology.  This has been ongoing for quite some time.  They are in the process of thinking about seeing ENT but his daughter states she has not talked him into it yet.    -Reports that he does have some arthritis in his back however he has not been having any significant back pain.      -MRI brain WO 10/7/2024  Diffusion sequences show no signal abnormalities to indicate acute  infarct or other process.     Brain parenchyma displays no evidence of mass, hemorrhage or  edema.  There is a stable pattern of periventricular white matter signal change  compatible with chronic small vessel ischemic disease. There is minimal  atrophy. No extra-axial abnormal findings are identified. The ventricles  and cisterns appear normal. Fluid is again seen in the left mastoid air  cells. There is chronic thickening of the sphenoid sinus.     IMPRESSION:  Chronic changes without acute findings.      Pertinent Medical History: CAD-11 stents; hypertension, dyslipidemia, dizziness, small A-fib, hip pain, insomnia, back pain    Subjective      Review of Systems:   Review of Systems   Neurological:  Positive for dizziness, tremors, weakness and memory problem.       Past Medical History:   Past Medical History:   Diagnosis Date    Ankle sprain     Anxiety     Arthritis of back     Arthritis of neck     Bursitis of hip     Coronary artery disease     Difficulty walking     GERD (gastroesophageal reflux disease)     Hip arthrosis     History of insomnia     HL (hearing loss)     Hyperlipidemia     Hypertension     Low back pain     Low back strain     Neck strain     Periarthritis of shoulder     Rotator cuff syndrome     Tennis elbow        Past Surgical History:   Past Surgical History:   Procedure Laterality Date    CORONARY STENT PLACEMENT         Family History:   Family History   Problem Relation Age of Onset    Heart disease Mother     Hypertension Mother     Heart attack Mother     Heart attack Father     Hyperlipidemia Sister     Cancer Sister     Diabetes Sister     Hypertension Sister     Heart attack Brother        Social History:   Social History     Socioeconomic History    Marital status:    Tobacco Use    Smoking status: Never     Passive exposure: Never    Smokeless tobacco: Current    Tobacco comments:     Chews on cigars   Vaping Use    Vaping status: Never Used   Substance and Sexual Activity    Alcohol use: Not Currently    Drug use: No    Sexual activity: Not Currently      Partners: Female       Medications:     Current Outpatient Medications:     ALPRAZolam (XANAX) 0.5 MG tablet, TAKE ONE (1) TABLET BY MOUTH EVERY NIGHT AS NEEDED FOR SLEEP/ANXIETY, Disp: 30 tablet, Rfl: 1    apixaban (Eliquis) 5 MG tablet tablet, TAKE 1 TABLET BY MOUTH 2 (TWO) TIMES A DAY., Disp: 60 tablet, Rfl: 11    aspirin 81 MG tablet, Take 1 tablet by mouth Daily., Disp: 30 tablet, Rfl: 0    Cariprazine HCl (Vraylar) 1.5 MG capsule capsule, Take 1 capsule by mouth Daily., Disp: 30 capsule, Rfl: 2    ciprofloxacin-dexAMETHasone (CIPRODEX) 0.3-0.1 % otic suspension, Administer 4 drops into both ears As Needed., Disp: , Rfl:     coenzyme Q10 100 MG capsule, Take 1 capsule by mouth Daily., Disp: , Rfl:     escitalopram (LEXAPRO) 10 MG tablet, TAKE ONE (1) TABLET BY MOUTH DAILY, Disp: 90 tablet, Rfl: 0    lisinopril-hydrochlorothiazide (Zestoretic) 20-12.5 MG per tablet, Take 1 tablet by mouth Daily. (Patient taking differently: Take 1 tablet by mouth Daily. When bp 150 or greater on top), Disp: , Rfl:     multivitamin (MULTI-DAY VITAMINS PO), Take 1 tablet by mouth Daily., Disp: , Rfl:     nitroglycerin (NITROSTAT) 0.4 MG SL tablet, place 1 tablet under the tongue if needed every 5 minutes fo, Disp: 25 tablet, Rfl: 5    nystatin (MYCOSTATIN) 030233 UNIT/GM powder, Apply  topically to the appropriate area as directed 3 (Three) Times a Day., Disp: 60 g, Rfl: 2    omeprazole (priLOSEC) 20 MG capsule, TAKE 1 CAPSULE BY MOUTH DAILY - MUST HAVE APPOINTMENT FOR FURTHER REFILLS, Disp: 90 capsule, Rfl: 0    rosuvastatin (CRESTOR) 5 MG tablet, Take 1 tablet by mouth Daily., Disp: 90 tablet, Rfl: 3    tamsulosin (FLOMAX) 0.4 MG capsule 24 hr capsule, Take 1 capsule by mouth Daily for 30 days., Disp: 30 capsule, Rfl: 0    carbidopa-levodopa (SINEMET)  MG per tablet, Take 1 tablet by mouth 3 (Three) Times a Day., Disp: 90 tablet, Rfl: 1    Allergies:   No Known Allergies    Objective     Physical Exam:  Vital Signs:  "  Vitals:    02/04/25 1000   BP: 140/80   Pulse: 68   Temp: 97.8 °F (36.6 °C)   SpO2: 97%   Weight: 100 kg (221 lb)   Height: 180.3 cm (71\")   PainSc: 0-No pain     Body mass index is 30.82 kg/m².     Physical Exam  Constitutional:       Appearance: Normal appearance.   HENT:      Head: Normocephalic.   Eyes:      General: Lids are normal.      Extraocular Movements: Extraocular movements intact.      Conjunctiva/sclera: Conjunctivae normal.   Pulmonary:      Effort: Pulmonary effort is normal.   Musculoskeletal:         General: Normal range of motion.   Skin:     General: Skin is warm and dry.   Neurological:      General: No focal deficit present.      Mental Status: He is alert and oriented to person, place, and time.      Cranial Nerves: Cranial nerves 2-12 are intact. No dysarthria.      Motor: Weakness present. No tremor or pronator drift.      Coordination: Finger-Nose-Finger Test normal. Rapid alternating movements normal.      Gait: Gait abnormal.      Comments: No tremors noted at rest, with action, no kinetic tremor    Patient is able to go from sitting to standing while pushing off the chair but takes 4 attempts.  He walks with the assistance of a rollator walker.  He takes very short shuffling steps.  He is forward flexed with flexion of the knees and hips while pushing the walker.  He takes very short steps while completing a turn and shuffles his feet to 10 times to complete a turn.  He is bradykinetic   Psychiatric:         Attention and Perception: Attention normal.         Mood and Affect: Mood normal. Affect is flat.         Speech: Speech normal.         Behavior: Behavior normal. Behavior is cooperative.         Thought Content: Thought content normal.         Cognition and Memory: Cognition normal.         Judgment: Judgment normal.         Neurological Exam  Mental Status  Alert. Oriented to person, place, time and situation. Oriented to person, place, and time. Speech is normal. no " dysarthria present. Language is fluent with no aphasia. Attention and concentration are normal. Fund of knowledge is appropriate for level of education.    Cranial Nerves  CN III, IV, VI: Extraocular movements intact bilaterally. Normal lids and orbits bilaterally.  CN V: Facial sensation is normal.  CN VII: Full and symmetric facial movement.  CN IX, X: Palate elevates symmetrically  CN XI: Shoulder shrug strength is normal.  CN XII: Tongue midline without atrophy or fasciculations.    Motor  Normal muscle bulk throughout. No fasciculations present. Decreased muscle tone. No abnormal involuntary movements.                                               Right                     Left  Deltoid                                   4+                          4+   Biceps                                   4+                          4+   Triceps                                  4+                          4+   Quadriceps                           3+                          3+   Anterior tibialis                      4-                          4-   Posterior tibialis                    4-                          4-   Peroneal                               4-                          4-  Ankle dorsiflexor                   4-                          4-  Ankle plantar flexor              4-                           4-    Sensory  Sensation is intact to light touch, pinprick, vibration and proprioception in all four extremities.    Coordination  No tremorRight: Finger-to-nose normal. Rapid alternating movement normal. Slightly slowed.    Gait   Abnormal gait. Unable to rise from chair without using arms.      Assessment / Plan      Assessment/Plan:   Diagnoses and all orders for this visit:    1. Parkinsonism, unspecified Parkinsonism type (Primary)  -     carbidopa-levodopa (SINEMET)  MG per tablet; Take 1 tablet by mouth 3 (Three) Times a Day.  Dispense: 90 tablet; Refill: 1       This is a pleasant 79-year-old  male patient here to establish care for parkinsonian features. Long discussion with pt and his daughter regarding parkinson symptoms. Will try pt on TID dosing of Sinemet and we discussed taking same time daily as well as taking on empty stomach or avoiding taking with protein. His daughter states she will set a timer on her mothers phone to help facilitate this. Indications and side effects discussed with patient he verbalizes understanding.  Pt will return in 6 weeks to reevaluate the effectiveness.  Discussed Kenna scan and Pricila states she will talk with pt to see if he will allow this. We also discussed ruling out any lower back pathology that could also be factoring in on his LE weakness. He will continue with PT and OT as he is currently enrolled with.  Patient will call in the interim if she has any questions or concerns or changes.    Follow Up:   Return in about 6 weeks (around 3/18/2025).    LOUISE Holder, FNP-BC  [facility]

## 2025-02-04 NOTE — TELEPHONE ENCOUNTER
Provider: VICENTE BETANCOURT    Caller: [place], [place] - [address] [facility] - [phone] [facility] [phone]     Relationship to Patient: Pharmacy    Phone Number: [phone]    Reason for Call: PHARMACIST PHONED TO CONFIRM PROVIDER IS WANTING TO SWITCH PATIENT TO REGULAR SINEMET RATHER THAN THE EXTENDED RELEASE VERSION. PLEASE ADVISE, THANK YOU.

## 2025-02-11 ENCOUNTER — PATIENT ROUNDING (BHMG ONLY) (OUTPATIENT)
Dept: NEUROLOGY | Facility: CLINIC | Age: 80
End: 2025-02-11
Payer: MEDICARE

## 2025-02-25 ENCOUNTER — TELEMEDICINE (OUTPATIENT)
Dept: BEHAVIORAL HEALTH | Facility: CLINIC | Age: 80
End: 2025-02-25
Payer: MEDICARE

## 2025-02-25 DIAGNOSIS — F39 MOOD DISORDER: ICD-10-CM

## 2025-02-25 DIAGNOSIS — G47.00 INSOMNIA, UNSPECIFIED TYPE: ICD-10-CM

## 2025-02-25 DIAGNOSIS — F41.1 GENERALIZED ANXIETY DISORDER: ICD-10-CM

## 2025-02-25 DIAGNOSIS — F33.1 MAJOR DEPRESSIVE DISORDER, RECURRENT EPISODE, MODERATE: Primary | ICD-10-CM

## 2025-02-25 RX ORDER — ALPRAZOLAM 0.5 MG
0.5 TABLET ORAL NIGHTLY PRN
Qty: 30 TABLET | Refills: 2 | Status: SHIPPED | OUTPATIENT
Start: 2025-02-25

## 2025-02-25 NOTE — PROGRESS NOTES
This provider is located at [facility], Behavioral Health, [address] [place] [address], [facility],using a secure MyChart Video Visit through [employer]. Patient is being seen remotely via telehealth at their home address in [place], and stated they are in a secure environment for this session. The patient's condition being diagnosed/treated is appropriate for telemedicine. The provider identified herself as well as her credentials.   The patient, and/or patients guardian, consent to be seen remotely, and when consent is given they understand that the consent allows for patient identifiable information to be sent to a third party as needed.   They may refuse to be seen remotely at any time. The electronic data is encrypted and password protected, and the patient and/or guardian has been advised of the potential risks to privacy not withstanding such measures.    Video Visit    Patient Name: Tristan Hillman  : 1945   MRN: [id number]   Care Team: Patient Care Team:  Kerri Chew APRN as PCP - General (Nurse Practitioner)  Katheryn Craven APRN as Nurse Practitioner (Behavioral Health)         Chief Complaint:    Chief Complaint   Patient presents with    Anxiety    Depression    Mood Disorder    Med Management       History of Present Illness: Tristan Hillman is a 79 y.o. male who presents via video visit for a medication management follow up.  Patient presents with his daughter to today's visit with patient and daughter reports that overall medication continues to be effective.  He reports feeling that his mood and anxiety have been managed well.  He does endorse some situational stressors but feels that he is managing them well. He did not see the need to make any changes and did not have any medication concerns at today's visit. He denies SI/HI today.     The following portion of the patient's history were reviewed and updated appropriately: allergies, current and past medications,  family history, medical history and social history. PHILIP reviewed and appropriate.   Subjective   Review of Systems:    Review of Systems   Respiratory: Negative.     Cardiovascular: Negative.  Negative for chest pain and palpitations.   Neurological: Negative.    Psychiatric/Behavioral:  Positive for stress. The patient is nervous/anxious.    All other systems reviewed and are negative.      Current Medications:   Current Outpatient Medications   Medication Sig Dispense Refill    ALPRAZolam (XANAX) 0.5 MG tablet Take 1 tablet by mouth At Night As Needed for Anxiety or Sleep. 30 tablet 2    apixaban (Eliquis) 5 MG tablet tablet TAKE 1 TABLET BY MOUTH 2 (TWO) TIMES A DAY. 60 tablet 11    aspirin 81 MG tablet Take 1 tablet by mouth Daily. 30 tablet 0    carbidopa-levodopa (SINEMET)  MG per tablet Take 1 tablet by mouth 3 (Three) Times a Day. 90 tablet 1    Cariprazine HCl (Vraylar) 1.5 MG capsule capsule Take 1 capsule by mouth Daily. 30 capsule 2    ciprofloxacin-dexAMETHasone (CIPRODEX) 0.3-0.1 % otic suspension Administer 4 drops into both ears As Needed.      coenzyme Q10 100 MG capsule Take 1 capsule by mouth Daily.      escitalopram (LEXAPRO) 10 MG tablet TAKE ONE (1) TABLET BY MOUTH DAILY 90 tablet 0    lisinopril-hydrochlorothiazide (Zestoretic) 20-12.5 MG per tablet Take 1 tablet by mouth Daily. (Patient taking differently: Take 1 tablet by mouth Daily. When bp 150 or greater on top)      multivitamin (MULTI-DAY VITAMINS PO) Take 1 tablet by mouth Daily.      nitroglycerin (NITROSTAT) 0.4 MG SL tablet place 1 tablet under the tongue if needed every 5 minutes fo 25 tablet 5    nystatin (MYCOSTATIN) 702122 UNIT/GM powder Apply  topically to the appropriate area as directed 3 (Three) Times a Day. 60 g 2    omeprazole (priLOSEC) 20 MG capsule TAKE 1 CAPSULE BY MOUTH DAILY - MUST HAVE APPOINTMENT FOR FURTHER REFILLS 90 capsule 0    rosuvastatin (CRESTOR) 5 MG tablet Take 1 tablet by mouth Daily. 90 tablet  3    tamsulosin (FLOMAX) 0.4 MG capsule 24 hr capsule Take 1 capsule by mouth Daily for 30 days. 30 capsule 0     No current facility-administered medications for this visit.       Mental Status Exam:   Hygiene:    appears to be WNL   Cooperation:  Cooperative  Eye Contact:  Good  Psychomotor Behavior:   appears to be WNL   Affect:  Appropriate  Mood: normal  Speech:  Normal  Thought Process:  Goal directed and Linear  Thought Content:  Normal and Mood congruent  Suicidal:  None  Homicidal:  None  Hallucinations:  None  Delusion:  None  Memory:  Intact  Orientation:  Person, Place, Time, and Situation  Reliability:  good  Insight:  Good  Judgement:  Good  Impulse Control:  Good  Physical/Medical Issues:  Yes see chart       Objective   Vital Signs:   There were no vitals taken for this visit.         BP Readings from Last 3 Encounters:   02/04/25 140/80   01/29/25 140/80   01/26/25 125/53        Pulse Readings from Last 3 Encounters:   02/04/25 68   01/29/25 60   01/26/25 78        Lab Results:     Lab Results   Component Value Date    WBC 7.55 01/23/2025    HGB 13.9 01/23/2025    HCT 39.7 01/23/2025    MCV 90.0 01/23/2025     01/23/2025         Lab Results   Component Value Date    GLUCOSE 106 (H) 01/26/2025    BUN 20 01/26/2025    CREATININE 0.77 01/26/2025     01/26/2025    K 3.8 01/26/2025     01/26/2025    CALCIUM 9.0 01/26/2025    PROTEINTOT 6.2 01/24/2025    ALBUMIN 3.6 01/24/2025    ALT 10 01/24/2025    AST 30 01/24/2025    ALKPHOS 54 01/24/2025    BILITOT 0.3 01/24/2025    GLOB 2.6 01/24/2025    AGRATIO 1.4 01/24/2025    BCR 26.0 (H) 01/26/2025    ANIONGAP 9.2 01/26/2025    EGFR 92.2 01/26/2025         Lab Results   Component Value Date    CHLPL 177 12/27/2023    TRIG 189 (H) 12/27/2023    HDL 39 (L) 12/27/2023     (H) 12/27/2023         Lab Results   Component Value Date    HGBA1C 5.9 (H) 12/27/2023          Lab Results   Component Value Date    TSH 1.190 04/18/2024          Assessment / Plan    Diagnoses and all orders for this visit:    1. Major depressive disorder, recurrent episode, moderate (Primary)    2. Generalized anxiety disorder  -     ALPRAZolam (XANAX) 0.5 MG tablet; Take 1 tablet by mouth At Night As Needed for Anxiety or Sleep.  Dispense: 30 tablet; Refill: 2    3. Insomnia, unspecified type  -     ALPRAZolam (XANAX) 0.5 MG tablet; Take 1 tablet by mouth At Night As Needed for Anxiety or Sleep.  Dispense: 30 tablet; Refill: 2    4. Mood disorder      Patient appears to be doing well on current medication. No issues reported and no indication for change. Will continue medication as ordered.     As part of patient's treatment plan I am prescribing a controlled substance.  The patient has been made aware of the appropriate use of such medications, including potential risk of somnolence, limited ability to drive and/or work safely, and potential for dependence and/or overdose.  It has also been made clear that these medications are for use by this patient only, without concomitant use of alcohol or other substances, unless prescribed.    Patient has completed a prescribing agreement detailing terms of continued prescribing of controlled substances, including monitoring PHILIP reports, urine drug screening, and pill counts if necessary.  Patient is aware that inappropriate use will result in cessation of prescribing such medications.    A psychological evaluation was conducted in order to assess past and current level of functioning. Areas assessed included, but were not limited to: perception of social support, perception of ability to face and deal with challenges in life (positive functioning), anxiety symptoms, depressive symptoms, perspective on beliefs/belief system, coping skills for stress, intelligence level,  Therapeutic rapport was established. Interventions conducted today were geared towards incorporating medication management along with support for  continued therapy. Education was also provided as to the med management with this provider and what to expect in subsequent sessions.      We discussed risks, benefits, goals and side effects of the above medication and the patient was agreeable with the plan. Patient was educated on the importance of compliance with treatment and follow-up appointments. Patient is aware to contact the [facility] with any worsening of symptoms. To call for questions or concerns and return early if necessary. Patent is agreeable to go to the Emergency Department or call 911 should they begin SI/HI.        MEDS ORDERED DURING VISIT:  New Medications Ordered This Visit   Medications    ALPRAZolam (XANAX) 0.5 MG tablet     Sig: Take 1 tablet by mouth At Night As Needed for Anxiety or Sleep.     Dispense:  30 tablet     Refill:  2         Follow Up   Return in about 3 months (around 5/25/2025).  Patient was given instructions and counseling regarding his condition or for health maintenance advice. Please see specific information pulled into the AVS if appropriate.     TREATMENT PLAN/GOALS: Continue supportive psychotherapy efforts and medications as indicated. Treatment and medication options discussed during today's visit. Patient acknowledged and verbally consented to continue with current treatment plan and was educated on the importance of compliance with treatment and follow-up appointments.    MEDICATION ISSUES:  Discussed medication options and treatment plan of prescribed medication as well as the risks, benefits, and side effects including potential falls, possible impaired driving and metabolic adversities among others. Patient is agreeable to call the office with any worsening of symptoms or onset of side effects. Patient is agreeable to call 911 or go to the nearest ER should he/she begin having SI/HI.        [facility]  [address] [place] [address]  [address] [place] [address]  [phone]    February 25, 2025 14:29 EST

## 2025-03-24 ENCOUNTER — OFFICE VISIT (OUTPATIENT)
Age: 80
End: 2025-03-24
Payer: MEDICARE

## 2025-03-24 VITALS
RESPIRATION RATE: 15 BRPM | TEMPERATURE: 96.8 F | WEIGHT: 202.4 LBS | OXYGEN SATURATION: 97 % | HEART RATE: 58 BPM | BODY MASS INDEX: 28.34 KG/M2 | HEIGHT: 71 IN

## 2025-03-24 DIAGNOSIS — Z87.898 HISTORY OF URINARY RETENTION: ICD-10-CM

## 2025-03-24 DIAGNOSIS — N40.1 BENIGN PROSTATIC HYPERPLASIA WITH URINARY FREQUENCY: Primary | ICD-10-CM

## 2025-03-24 DIAGNOSIS — R19.5 LOOSE BOWEL MOVEMENTS: ICD-10-CM

## 2025-03-24 DIAGNOSIS — R35.0 BENIGN PROSTATIC HYPERPLASIA WITH URINARY FREQUENCY: Primary | ICD-10-CM

## 2025-03-24 PROBLEM — J10.1 INFLUENZA A: Status: RESOLVED | Noted: 2025-01-23 | Resolved: 2025-03-24

## 2025-03-24 LAB
BILIRUB BLD-MCNC: NEGATIVE MG/DL
CLARITY, POC: CLEAR
COLOR UR: ABNORMAL
EXPIRATION DATE: ABNORMAL
GLUCOSE UR STRIP-MCNC: NEGATIVE MG/DL
KETONES UR QL: NEGATIVE
LEUKOCYTE EST, POC: NEGATIVE
Lab: ABNORMAL
NITRITE UR-MCNC: NEGATIVE MG/ML
PH UR: 6 [PH] (ref 5–8)
PROT UR STRIP-MCNC: ABNORMAL MG/DL
RBC # UR STRIP: NEGATIVE /UL
SP GR UR: 1.03 (ref 1–1.03)
UROBILINOGEN UR QL: NORMAL

## 2025-03-24 PROCEDURE — G2211 COMPLEX E/M VISIT ADD ON: HCPCS | Performed by: NURSE PRACTITIONER

## 2025-03-24 PROCEDURE — 51798 US URINE CAPACITY MEASURE: CPT | Performed by: NURSE PRACTITIONER

## 2025-03-24 PROCEDURE — 1160F RVW MEDS BY RX/DR IN RCRD: CPT | Performed by: NURSE PRACTITIONER

## 2025-03-24 PROCEDURE — 1159F MED LIST DOCD IN RCRD: CPT | Performed by: NURSE PRACTITIONER

## 2025-03-24 PROCEDURE — 99214 OFFICE O/P EST MOD 30 MIN: CPT | Performed by: NURSE PRACTITIONER

## 2025-03-24 PROCEDURE — 81003 URINALYSIS AUTO W/O SCOPE: CPT | Performed by: NURSE PRACTITIONER

## 2025-03-24 RX ORDER — HYDROCHLOROTHIAZIDE 12.5 MG/1
12.5 TABLET ORAL DAILY
COMMUNITY

## 2025-03-24 NOTE — PROGRESS NOTES
Office Visit     Patient Name: Tristan Hillman  : 1945   MRN: [id number]   Patient or patient representative verbalized consent for the use of Ambient Listening during the visit with  LOUISE Lozoya for chart documentation. 3/24/2025  12:36 EDT    Chief Complaint   Patient presents with    Urinary Retention    Establish Care     Referring Provider: Kerri Chew APRN    Primary Care Provider: Kerri Chew APRN     History of Present Illness  The patient presents for evaluation of urinary frequency, accompanied by his daughter.  She notes increased confusion at night.      Urinary Frequency  - Hospitalized in 2025 for influenza complications, experienced acute urinary retention requiring Soctt catheter.  - Post-discharge, completed fill and void test, no subsequent urinary retention.  - Reports nocturia every 2 hours since hospitalization, wears pull-ups at night, changes twice.  - Fluid intake: water, coffee, occasional Coke, glass of milk with breakfast.  - Limits water intake due to diuretic effect, consumes ~40 ounces daily, two cups of coffee in the morning.    Bowel Movements  - Reports loose bowel movements.  - Low-fiber diet.    Supplemental information: Snoring present, negative for sleep apnea.       IPSS Questionnaire (AUA-7):  Incomplete emptying  Over the past month, how often have you had a sensation of not emptying your bladder completely after you finished urinating?: Not at all (25 111)  Frequency  Over the past month, how often have you had to urinate again less than two hours after you finishied urinating ?: Less than half the time (25 1117)  Intermittency  Over the past month, how often have you found you stopped and started again several time when you urinated ?: More than half the time (25 1117)  Urgency  Over the last month, how often have you found it difficult  have you found it difficult to postpone urination ?: Almost always (25  1117)  Weak Stream  Over the past month, how often have you had a weak urinary stream ?: Less than 1 time in 5 (03/24/25 1117)  Straining  Over the past month, how often have you had to push or strain to begin urination ?: About half the time (03/24/25 1117)  Nocturia  Over the past month, how many times did you most typically get up to urinate from the time you went to bed until the time you got up in the morning ?: 2 times (03/24/25 1117)  Quality of life due to urinary symptoms  If you were to spend the rest of your life with your urinary condition the way it is now, how would feel about that?: Mostly satisfied (03/24/25 1117)    Scores  Total IPSS Score: (!) 17 (03/24/25 1117)  Total Score = Symptomatic Level: Moderately symptomatic: 8-19 (03/24/25 1117)      Subjective   Review of System: As noted in HPI.    Past Medical History:   Diagnosis Date    Ankle sprain     Anxiety     Arthritis of back     Arthritis of neck     Bursitis of hip     Coronary artery disease     Difficulty walking     GERD (gastroesophageal reflux disease)     Hip arthrosis     History of insomnia     HL (hearing loss)     Hyperlipidemia     Hypertension     Low back pain     Low back strain     Neck strain     Periarthritis of shoulder     Rotator cuff syndrome     Tennis elbow      Past Surgical History:   Procedure Laterality Date    CORONARY STENT PLACEMENT       Family History   Problem Relation Age of Onset    Heart disease Mother     Hypertension Mother     Heart attack Mother     Heart attack Father     Hyperlipidemia Sister     Cancer Sister     Diabetes Sister     Hypertension Sister     Heart attack Brother      Social History     Socioeconomic History    Marital status:    Tobacco Use    Smoking status: Never     Passive exposure: Never    Smokeless tobacco: Never    Tobacco comments:     Chews on cigars   Vaping Use    Vaping status: Never Used   Substance and Sexual Activity    Alcohol use: Not Currently    Drug use:  No    Sexual activity: Not Currently     Partners: Female       Current Outpatient Medications:     ALPRAZolam (XANAX) 0.5 MG tablet, Take 1 tablet by mouth At Night As Needed for Anxiety or Sleep., Disp: 30 tablet, Rfl: 2    apixaban (Eliquis) 5 MG tablet tablet, TAKE 1 TABLET BY MOUTH 2 (TWO) TIMES A DAY., Disp: 60 tablet, Rfl: 11    aspirin 81 MG tablet, Take 1 tablet by mouth Daily., Disp: 30 tablet, Rfl: 0    carbidopa-levodopa (SINEMET)  MG per tablet, Take 1 tablet by mouth 3 (Three) Times a Day., Disp: 90 tablet, Rfl: 1    Cariprazine HCl (Vraylar) 1.5 MG capsule capsule, Take 1 capsule by mouth Daily., Disp: 30 capsule, Rfl: 2    ciprofloxacin-dexAMETHasone (CIPRODEX) 0.3-0.1 % otic suspension, Administer 4 drops into both ears As Needed., Disp: , Rfl:     coenzyme Q10 100 MG capsule, Take 1 capsule by mouth Daily., Disp: , Rfl:     escitalopram (LEXAPRO) 10 MG tablet, TAKE ONE (1) TABLET BY MOUTH DAILY, Disp: 90 tablet, Rfl: 0    lisinopril-hydrochlorothiazide (Zestoretic) 20-12.5 MG per tablet, Take 1 tablet by mouth Daily. (Patient taking differently: Take 1 tablet by mouth Daily. When bp 150 or greater on top), Disp: , Rfl:     multivitamin (MULTI-DAY VITAMINS PO), Take 1 tablet by mouth Daily., Disp: , Rfl:     nystatin (MYCOSTATIN) 539609 UNIT/GM powder, Apply  topically to the appropriate area as directed 3 (Three) Times a Day., Disp: 60 g, Rfl: 2    omeprazole (priLOSEC) 20 MG capsule, TAKE 1 CAPSULE BY MOUTH DAILY - MUST HAVE APPOINTMENT FOR FURTHER REFILLS, Disp: 90 capsule, Rfl: 0    rosuvastatin (CRESTOR) 5 MG tablet, Take 1 tablet by mouth Daily., Disp: 90 tablet, Rfl: 3    hydroCHLOROthiazide 12.5 MG tablet, Take 1 tablet by mouth Daily., Disp: , Rfl:     nitroglycerin (NITROSTAT) 0.4 MG SL tablet, place 1 tablet under the tongue if needed every 5 minutes fo (Patient not taking: Reported on 3/24/2025), Disp: 25 tablet, Rfl: 5    No Known Allergies  Objective   Visit Vitals  Pulse 58  "  Temp 96.8 °F (36 °C) (Temporal)   Resp 15   Ht 180.3 cm (71\")   Wt 91.8 kg (202 lb 6.4 oz)   SpO2 97%   BMI 28.23 kg/m²      Body mass index is 28.23 kg/m².   Physical Exam  Vitals and nursing note reviewed.   Constitutional:       General: He is not in acute distress.     Appearance: Normal appearance. He is not ill-appearing.   HENT:      Head: Normocephalic and atraumatic.      Comments: Very hard of hearing   Pulmonary:      Effort: Pulmonary effort is normal.   Skin:     General: Skin is warm and dry.   Neurological:      General: No focal deficit present.      Mental Status: He is alert and oriented to person, place, and time.   Psychiatric:         Mood and Affect: Mood normal.         Behavior: Behavior normal.          Labs  Lab Results   Component Value Date    COLORU Dark Yellow 03/24/2025    CLARITYU Clear 03/24/2025    SPECGRAV 1.030 03/24/2025    PHUR 6.0 03/24/2025    LEUKOCYTESUR Negative 03/24/2025    NITRITE Negative 03/24/2025    PROTEINPOCUA 1+ (A) 03/24/2025    GLUCOSEUR Negative 03/24/2025    KETONESU Negative 03/24/2025    UROBILINOGEN Normal 03/24/2025    BILIRUBINUR Negative 03/24/2025    RBCUR Negative 03/24/2025      Lab Results   Component Value Date    WBCUA 0-2 01/25/2025    RBCUA 21-50 (A) 01/25/2025    BACTERIA Trace (A) 01/25/2025    HYALCASTU None Seen 01/25/2025    SQUAMEPIUA 0-2 01/25/2025        Lab Results   Component Value Date    WBC 7.55 01/23/2025    HGB 13.9 01/23/2025    HCT 39.7 01/23/2025    MCV 90.0 01/23/2025     01/23/2025     Lab Results   Component Value Date    GLUCOSE 106 (H) 01/26/2025    CALCIUM 9.0 01/26/2025     01/26/2025    K 3.8 01/26/2025    CO2 25.8 01/26/2025     01/26/2025    BUN 20 01/26/2025    BUN 17 01/24/2025    CREATININE 0.77 01/26/2025    CREATININE 0.79 01/24/2025    EGFR 92.2 01/26/2025    EGFR 91.5 01/24/2025    BCR 26.0 (H) 01/26/2025    ANIONGAP 9.2 01/26/2025    ALT 10 01/24/2025    AST 30 01/24/2025     Lab Results " "  Component Value Date    HGBA1C 5.9 (H) 12/27/2023     Lab Results   Component Value Date    PSA 1.5 12/27/2023    PSA 1.1 05/24/2023    PSA 1.260 02/08/2021     No results found for: \"URICACIDSTN\", \"AGWB9MKIIPD\", \"UCTE5JELAI\", \"LABMAGN\"  Lab Results   Component Value Date    KOYI56BL 37.4 12/27/2023    URICACID 6.2 05/24/2023     No results found for: \"CYSTINE\", \"URINEVOLUM\", \"CALCIUMUR\", \"OXALATEU\", \"CITRATEUR\", \"LABPH\", \"URICUR\", \"NAUR\", \"KUR\", \"MAGNESIUMUR\", \"PHOSUR\", \"AMMONIUMUR\", \"CLUR\", \"VVGXOHITR82R\", \"UREAUR\", \"LABCREAUR\"  Lab Results   Component Value Date    PSA 1.5 12/27/2023       Radiographic Studies  XR Chest 1 View  Result Date: 1/23/2025  Unremarkable portable chest.    This report was signed and finalized on 1/23/2025 9:58 AM by Jf Murray MD.      I have reviewed the above labs and imaging.     PVR  Post-void residual performed with ultrasound by staff and interpreted by me - 0 mL    Assessment / Plan    Diagnoses and all orders for this visit:    1. Benign prostatic hyperplasia with urinary frequency (Primary)  -     POC Urinalysis Dipstick, Automated    2. History of urinary retention    3. Loose bowel movements       Assessment & Plan  1. BPH with urinary frequency  - UA benign aside from proteinuria and concentrated specimen.   - No urinary retention today.  PVR 0 ml.   - Fluid intake adequate, bladder conditioned to nocturia   - Advised to urinate every 2-3 hours during the day  - Bladder training:    Maintain consistent schedule  Consume most fluids in the morning  May Continue morning coffee  Avoid bladder irritants at night  Increase morning water intake  with goal of 64 fluid ounces per day.   Cease fluids 2 hours before bedtime  - Follow-up in 1 month to assess interventions  - Discuss BPH workup if behavioral modifications are not effective.      2. History of urinary retention:  - PVR 0 ml.      3. Loose bowel movements  - Advised to include protein and fiber in diet to bulk " stools       Return for f/u with Radha in 1 month in [place] .    Radha Castillo, MSN, APRN, FNP-C  [facility]

## 2025-03-31 ENCOUNTER — OFFICE VISIT (OUTPATIENT)
Dept: FAMILY MEDICINE CLINIC | Facility: CLINIC | Age: 80
End: 2025-03-31
Payer: MEDICARE

## 2025-03-31 VITALS
BODY MASS INDEX: 30.77 KG/M2 | HEIGHT: 71 IN | OXYGEN SATURATION: 95 % | DIASTOLIC BLOOD PRESSURE: 72 MMHG | SYSTOLIC BLOOD PRESSURE: 142 MMHG | HEART RATE: 74 BPM | WEIGHT: 219.8 LBS | RESPIRATION RATE: 18 BRPM

## 2025-03-31 DIAGNOSIS — I25.10 CORONARY ARTERY DISEASE INVOLVING NATIVE CORONARY ARTERY OF NATIVE HEART WITHOUT ANGINA PECTORIS: ICD-10-CM

## 2025-03-31 DIAGNOSIS — K21.9 GASTROESOPHAGEAL REFLUX DISEASE WITHOUT ESOPHAGITIS: Primary | ICD-10-CM

## 2025-03-31 DIAGNOSIS — M54.50 CHRONIC LEFT-SIDED LOW BACK PAIN WITHOUT SCIATICA: ICD-10-CM

## 2025-03-31 DIAGNOSIS — I48.0 PAROXYSMAL ATRIAL FIBRILLATION: ICD-10-CM

## 2025-03-31 DIAGNOSIS — R42 DIZZINESS: ICD-10-CM

## 2025-03-31 DIAGNOSIS — I10 PRIMARY HYPERTENSION: ICD-10-CM

## 2025-03-31 DIAGNOSIS — G47.00 INSOMNIA, UNSPECIFIED TYPE: ICD-10-CM

## 2025-03-31 DIAGNOSIS — G25.9 MOVEMENT DISORDER: ICD-10-CM

## 2025-03-31 DIAGNOSIS — G89.29 CHRONIC LEFT-SIDED LOW BACK PAIN WITHOUT SCIATICA: ICD-10-CM

## 2025-03-31 DIAGNOSIS — R29.818 PARKINSONIAN FEATURES: ICD-10-CM

## 2025-03-31 DIAGNOSIS — E78.5 DYSLIPIDEMIA: ICD-10-CM

## 2025-03-31 DIAGNOSIS — L71.0 PERIORAL DERMATITIS: ICD-10-CM

## 2025-03-31 RX ORDER — METRONIDAZOLE 10 MG/G
GEL TOPICAL DAILY
Qty: 60 G | Refills: 2 | Status: SHIPPED | OUTPATIENT
Start: 2025-03-31

## 2025-03-31 NOTE — PROGRESS NOTES
Subjective     Chief Complaint:    Chief Complaint   Patient presents with    Anxiety    Depression       History of Present Illness:   Continues to have chronic dizziness,   Rash around his mouth, scabs at time on his nose   Parkinsomnism, sinimet not helping, following with neuro  Has anxiety/insmonia/depression/mood, on prn xanax and vraylar   HTN/CHF/PAF, follows with cards, not needing BP pill at this time, uses hctz to help with swelling         Review of Systems  Gen- No fevers, chills  CV- No chest pain, palpitations  Resp- No cough, dyspnea  GI- No N/V/D, abd pain  Neuro-No dizziness, headaches      I have reviewed and/or updated the patient's past medical, surgical, family, social history and problem list as appropriate.     Medications:    Current Outpatient Medications:     ALPRAZolam (XANAX) 0.5 MG tablet, Take 1 tablet by mouth At Night As Needed for Anxiety or Sleep., Disp: 30 tablet, Rfl: 2    apixaban (Eliquis) 5 MG tablet tablet, TAKE 1 TABLET BY MOUTH 2 (TWO) TIMES A DAY., Disp: 60 tablet, Rfl: 11    aspirin 81 MG tablet, Take 1 tablet by mouth Daily., Disp: 30 tablet, Rfl: 0    carbidopa-levodopa (SINEMET)  MG per tablet, Take 1 tablet by mouth 3 (Three) Times a Day., Disp: 90 tablet, Rfl: 1    Cariprazine HCl (Vraylar) 1.5 MG capsule capsule, Take 1 capsule by mouth Daily., Disp: 30 capsule, Rfl: 2    coenzyme Q10 100 MG capsule, Take 1 capsule by mouth Daily., Disp: , Rfl:     escitalopram (LEXAPRO) 10 MG tablet, TAKE ONE (1) TABLET BY MOUTH DAILY, Disp: 90 tablet, Rfl: 0    hydroCHLOROthiazide 12.5 MG tablet, Take 1 tablet by mouth Daily., Disp: , Rfl:     metroNIDAZOLE (Metrogel) 1 % gel, Apply  topically to the appropriate area as directed Daily., Disp: 60 g, Rfl: 2    multivitamin (MULTI-DAY VITAMINS PO), Take 1 tablet by mouth Daily., Disp: , Rfl:     nystatin (MYCOSTATIN) 648433 UNIT/GM powder, Apply  topically to the appropriate area as directed 3 (Three) Times a Day., Disp:  "60 g, Rfl: 2    omeprazole (priLOSEC) 20 MG capsule, TAKE 1 CAPSULE BY MOUTH DAILY - MUST HAVE APPOINTMENT FOR FURTHER REFILLS, Disp: 90 capsule, Rfl: 0    rosuvastatin (CRESTOR) 5 MG tablet, Take 1 tablet by mouth Daily., Disp: 90 tablet, Rfl: 3    Allergies:  No Known Allergies    Objective     Vital Signs:   Vitals:    03/31/25 1322   BP: 142/72   Pulse: 74   Resp: 18   SpO2: 95%   Weight: 99.7 kg (219 lb 12.8 oz)   Height: 180.3 cm (71\")     Body mass index is 30.66 kg/m².    Physical Exam:    Physical Exam  Vitals and nursing note reviewed.   Constitutional:       Appearance: He is well-developed.   HENT:      Head: Normocephalic and atraumatic.   Eyes:      Pupils: Pupils are equal, round, and reactive to light.   Cardiovascular:      Rate and Rhythm: Normal rate and regular rhythm.      Heart sounds: Normal heart sounds.   Pulmonary:      Effort: Pulmonary effort is normal.      Breath sounds: Normal breath sounds.   Abdominal:      General: Bowel sounds are normal. There is no distension.      Palpations: Abdomen is soft.      Tenderness: There is no abdominal tenderness.   Musculoskeletal:      Cervical back: Neck supple.   Skin:     General: Skin is warm and dry.      Findings: Rash (around mouth and nose, rasied erythematous) present.   Neurological:      General: No focal deficit present.      Mental Status: He is alert.   Psychiatric:         Mood and Affect: Mood normal.         Behavior: Behavior normal.         Assessment / Plan     Assessment/Plan:   Problem List Items Addressed This Visit       Gastroesophageal reflux disease without esophagitis - Primary    Relevant Medications    omeprazole (priLOSEC) 20 MG capsule    Dyslipidemia    Primary hypertension    Relevant Medications    hydroCHLOROthiazide 12.5 MG tablet    Insomnia    Relevant Medications    ALPRAZolam (XANAX) 0.5 MG tablet    Chronic left-sided low back pain without sciatica    Coronary artery disease involving native coronary artery " without angina pectoris    Paroxysmal atrial fibrillation     Other Visit Diagnoses         Parkinsonian features          Movement disorder          Dizziness          Perioral dermatitis        Relevant Medications    metroNIDAZOLE (Metrogel) 1 % gel          --The current medical regimen is effective;  continue present plan and medications.  -- continue f/u with specialist       Discussed plan of care in detail with pt today; pt verb understanding and agrees.    Follow up:  4 months     Electronically signed by LOUISE Grissom   03/31/2025 13:57 EDT      Please note that portions of this note were completed with a voice recognition program.

## 2025-04-01 DIAGNOSIS — G20.C PARKINSONISM, UNSPECIFIED PARKINSONISM TYPE: ICD-10-CM

## 2025-04-01 RX ORDER — CARBIDOPA AND LEVODOPA 25; 100 MG/1; MG/1
1 TABLET ORAL 3 TIMES DAILY
Qty: 90 TABLET | Refills: 1 | Status: SHIPPED | OUTPATIENT
Start: 2025-04-01

## 2025-04-15 ENCOUNTER — OFFICE VISIT (OUTPATIENT)
Dept: NEUROLOGY | Facility: CLINIC | Age: 80
End: 2025-04-15
Payer: MEDICARE

## 2025-04-15 VITALS
HEART RATE: 78 BPM | OXYGEN SATURATION: 98 % | WEIGHT: 219 LBS | SYSTOLIC BLOOD PRESSURE: 138 MMHG | BODY MASS INDEX: 30.66 KG/M2 | TEMPERATURE: 98.6 F | HEIGHT: 71 IN | DIASTOLIC BLOOD PRESSURE: 82 MMHG

## 2025-04-15 DIAGNOSIS — G20.C PARKINSONISM, UNSPECIFIED PARKINSONISM TYPE: Primary | ICD-10-CM

## 2025-04-15 DIAGNOSIS — R25.8 BRADYKINESIA: ICD-10-CM

## 2025-04-15 DIAGNOSIS — R41.89 SUBJECTIVE MEMORY COMPLAINTS: ICD-10-CM

## 2025-04-15 RX ORDER — CARBIDOPA AND LEVODOPA 25; 100 MG/1; MG/1
2 TABLET ORAL 3 TIMES DAILY
Qty: 180 TABLET | Refills: 3 | Status: SHIPPED | OUTPATIENT
Start: 2025-04-15

## 2025-04-15 RX ORDER — ESCITALOPRAM OXALATE 10 MG/1
10 TABLET ORAL DAILY
Qty: 90 TABLET | Refills: 0 | Status: SHIPPED | OUTPATIENT
Start: 2025-04-15

## 2025-04-15 RX ORDER — MEMANTINE HYDROCHLORIDE 5 MG/1
5 TABLET ORAL DAILY
Qty: 30 TABLET | Refills: 3 | Status: SHIPPED | OUTPATIENT
Start: 2025-04-15

## 2025-04-15 NOTE — PROGRESS NOTES
Follow Up Office Visit      Patient Name: Tristan Hillman  : 1945   MRN: [id number]     Chief Complaint:    Chief Complaint   Patient presents with    Follow-up     Patient reports worsening tremor and weakness       History of Present Illness: Tristan Hillman is a 80 y.o. male who is here today to follow up with  bradykinesia and parkinsonism.  He was last seen in the clinic on 2025.  He is accompanied to the office visit today by his daughter, Suzanne.  He has been tolerating the carbidopa levodopa  3 times daily well.  His daughter states he is able to move easier after he takes it but she feels that it wears off too quickly.  He denies any falls.  Denies any diplopia.  His daughter states he is not been getting strangled anymore frequently and is only happening occasionally with eating and drinking.  Patient is able to feed himself.  He does require assistance with bathing and dressing.  He lives at home with his wife.  His daughter helps manage the medications.  He is also under the care of behavioral health.  His daughter is concerned about the Vraylar as she has been reading up on it and has concerns which I directed back to behavioral health as she can voice her concerns with them.  He denies any hallucinations.  He uses the assistance of a rollator walker.  He says he often times holds the brakes on it as it rolls faster than he can walk.  He does have retropulsion.  Still has difficulty going from sitting to standing and turning in the bed or getting out of the bed.  They have a bed rail which helps him.  He also has a shower chair.  He has had PT and OT in the past but is not interested in this right now he still has his bands and things to do exercises he just usually does not participate in any HEP.  His daughter states that his short-term memory is poor and he does have some cognitive decline.  They would be interested in starting some memory enhancement medicine.  Patient is able to  state that this month is April and today is Tuesday but he is unable to state the year.        -MRI brain WO 10/7/2024  Diffusion sequences show no signal abnormalities to indicate acute  infarct or other process.     Brain parenchyma displays no evidence of mass, hemorrhage or edema.  There is a stable pattern of periventricular white matter signal change  compatible with chronic small vessel ischemic disease. There is minimal  atrophy. No extra-axial abnormal findings are identified. The ventricles  and cisterns appear normal. Fluid is again seen in the left mastoid air  cells. There is chronic thickening of the sphenoid sinus.     IMPRESSION:  Chronic changes without acute findings.      Pertinent Medical History: CAD-11 stents; hypertension, dyslipidemia, dizziness, small A-fib, hip pain, insomnia, back pain        Subjective      Review of Systems:   Review of Systems   Neurological:  Positive for tremors and memory problem.       I have reviewed and the following portions of the patient's history were updated as appropriate: past family history, past medical history, past social history, past surgical history and problem list.    Medications:     Current Outpatient Medications:     ALPRAZolam (XANAX) 0.5 MG tablet, Take 1 tablet by mouth At Night As Needed for Anxiety or Sleep., Disp: 30 tablet, Rfl: 2    apixaban (Eliquis) 5 MG tablet tablet, TAKE 1 TABLET BY MOUTH 2 (TWO) TIMES A DAY., Disp: 60 tablet, Rfl: 11    aspirin 81 MG tablet, Take 1 tablet by mouth Daily., Disp: 30 tablet, Rfl: 0    carbidopa-levodopa (SINEMET)  MG per tablet, Take 2 tablets by mouth 3 (Three) Times a Day., Disp: 180 tablet, Rfl: 3    Cariprazine HCl (Vraylar) 1.5 MG capsule capsule, Take 1 capsule by mouth Daily., Disp: 30 capsule, Rfl: 2    coenzyme Q10 100 MG capsule, Take 1 capsule by mouth Daily., Disp: , Rfl:     escitalopram (LEXAPRO) 10 MG tablet, TAKE ONE (1) TABLET BY MOUTH DAILY, Disp: 90 tablet, Rfl: 0     "hydroCHLOROthiazide 12.5 MG tablet, Take 1 tablet by mouth Daily., Disp: , Rfl:     metroNIDAZOLE (Metrogel) 1 % gel, Apply  topically to the appropriate area as directed Daily., Disp: 60 g, Rfl: 2    multivitamin (MULTI-DAY VITAMINS PO), Take 1 tablet by mouth Daily., Disp: , Rfl:     nystatin (MYCOSTATIN) 985780 UNIT/GM powder, Apply  topically to the appropriate area as directed 3 (Three) Times a Day., Disp: 60 g, Rfl: 2    omeprazole (priLOSEC) 20 MG capsule, TAKE 1 CAPSULE BY MOUTH DAILY - MUST HAVE APPOINTMENT FOR FURTHER REFILLS, Disp: 90 capsule, Rfl: 0    rosuvastatin (CRESTOR) 5 MG tablet, Take 1 tablet by mouth Daily., Disp: 90 tablet, Rfl: 3    memantine (NAMENDA) 5 MG tablet, Take 1 tablet by mouth Daily., Disp: 30 tablet, Rfl: 3    Allergies:   No Known Allergies    Objective     Physical Exam:  Vital Signs:   Vitals:    04/15/25 1355   BP: 138/82   Pulse: 78   Temp: 98.6 °F (37 °C)   SpO2: 98%   Weight: 99.3 kg (219 lb)  Comment: per patient   Height: 180.3 cm (71\")   PainSc: 0-No pain     Body mass index is 30.54 kg/m².    Physical Exam  Constitutional:       Appearance: Normal appearance.   HENT:      Head: Normocephalic.   Eyes:      Conjunctiva/sclera: Conjunctivae normal.   Pulmonary:      Effort: Pulmonary effort is normal.   Skin:     General: Skin is warm and dry.   Neurological:      General: No focal deficit present.      Mental Status: He is alert. Mental status is at baseline.      Cranial Nerves: Cranial nerves 2-12 are intact. No dysarthria.      Motor: Tremor present.      Gait: Gait abnormal.      Comments: He has tremor in the right leg more so when he is initially trying to stand and once he gets stood upright.  Patient goes from sitting to standing on fourth attempt.  He is very bradykinetic.  He ambulates with a forward flexed slowed gait.  He takes short steps and scuffs his toes.  He completes a turn in 10-12 steps.   Psychiatric:         Attention and Perception: Attention " normal.         Mood and Affect: Mood normal. Affect is flat.         Speech: Speech normal.         Behavior: Behavior normal. Behavior is cooperative.         Thought Content: Thought content normal.         Cognition and Memory: Cognition is impaired. Memory is impaired.         Judgment: Judgment normal.         Neurological Exam  Mental Status  Alert. Oriented only to person, place and situation. Speech is normal. no dysarthria present. Language is fluent with no aphasia.  Patient wears bilateral hearing aids and is very hard of hearing.    Coordination   Tremor.    Gait   Abnormal gait.      Assessment / Plan      Assessment/Plan:   Diagnoses and all orders for this visit:    1. Parkinsonism, unspecified Parkinsonism type (Primary)  -     carbidopa-levodopa (SINEMET)  MG per tablet; Take 2 tablets by mouth 3 (Three) Times a Day.  Dispense: 180 tablet; Refill: 3    2. Subjective memory complaints  -     memantine (NAMENDA) 5 MG tablet; Take 1 tablet by mouth Daily.  Dispense: 30 tablet; Refill: 3    3. Bradykinesia           Will increase carbidopa levodopa to 2 tablets 3 times daily.  Will also add memantine 5 mg daily.  Will hold on donepezil due to some bradycardia which could also be worsened with donepezil.  Indications and side effects discussed with patient daughter who manages his medications and she verbalizes understanding.  Patient will call in the interim if he has any questions or concerns or changes.    Follow Up:   Return in about 10 weeks (around 6/24/2025).    LOUISE Holder, FNP-[facility]

## 2025-05-02 ENCOUNTER — TELEPHONE (OUTPATIENT)
Dept: CARDIOLOGY | Facility: CLINIC | Age: 80
End: 2025-05-02
Payer: MEDICARE

## 2025-05-02 DIAGNOSIS — I48.0 PAROXYSMAL ATRIAL FIBRILLATION: Primary | ICD-10-CM

## 2025-05-02 NOTE — TELEPHONE ENCOUNTER
Pt lvm stating he lost his NTG. Called pt back, no answer, lvm asking pt to return call. Of note, I do not see NTG in med list. Pt requesting NTG rx, no current CP, pt feels more comfortable having rx on hand

## 2025-05-05 RX ORDER — NITROGLYCERIN 0.4 MG/1
0.4 TABLET SUBLINGUAL
COMMUNITY
End: 2025-05-05 | Stop reason: SDUPTHER

## 2025-05-05 RX ORDER — NITROGLYCERIN 0.4 MG/1
0.4 TABLET SUBLINGUAL
Qty: 25 TABLET | Refills: 3 | Status: SHIPPED | OUTPATIENT
Start: 2025-05-05

## 2025-06-06 DIAGNOSIS — F41.1 GENERALIZED ANXIETY DISORDER: ICD-10-CM

## 2025-06-06 DIAGNOSIS — G47.00 INSOMNIA, UNSPECIFIED TYPE: ICD-10-CM

## 2025-06-09 RX ORDER — ALPRAZOLAM 0.5 MG
0.5 TABLET ORAL NIGHTLY PRN
Qty: 30 TABLET | Refills: 0 | Status: SHIPPED | OUTPATIENT
Start: 2025-06-09

## 2025-06-09 NOTE — TELEPHONE ENCOUNTER
Called patient and spoke with his daughter, he has stopped taking his Vraylar. He thinks it was making him dizzy. Patients daughter said she wanted to talk to him and they would call back and schedule.

## 2025-06-24 ENCOUNTER — OFFICE VISIT (OUTPATIENT)
Dept: NEUROLOGY | Facility: CLINIC | Age: 80
End: 2025-06-24
Payer: MEDICARE

## 2025-06-24 VITALS
SYSTOLIC BLOOD PRESSURE: 130 MMHG | HEIGHT: 71 IN | OXYGEN SATURATION: 96 % | HEART RATE: 80 BPM | WEIGHT: 222 LBS | TEMPERATURE: 98.6 F | BODY MASS INDEX: 31.08 KG/M2 | DIASTOLIC BLOOD PRESSURE: 78 MMHG

## 2025-06-24 DIAGNOSIS — R41.89 SUBJECTIVE MEMORY COMPLAINTS: ICD-10-CM

## 2025-06-24 DIAGNOSIS — G20.C PARKINSONISM, UNSPECIFIED PARKINSONISM TYPE: ICD-10-CM

## 2025-06-24 PROCEDURE — 99214 OFFICE O/P EST MOD 30 MIN: CPT | Performed by: NURSE PRACTITIONER

## 2025-06-24 PROCEDURE — 1160F RVW MEDS BY RX/DR IN RCRD: CPT | Performed by: NURSE PRACTITIONER

## 2025-06-24 PROCEDURE — 3078F DIAST BP <80 MM HG: CPT | Performed by: NURSE PRACTITIONER

## 2025-06-24 PROCEDURE — 1159F MED LIST DOCD IN RCRD: CPT | Performed by: NURSE PRACTITIONER

## 2025-06-24 PROCEDURE — 3075F SYST BP GE 130 - 139MM HG: CPT | Performed by: NURSE PRACTITIONER

## 2025-06-24 RX ORDER — MEMANTINE HYDROCHLORIDE 5 MG/1
5 TABLET ORAL 2 TIMES DAILY
Qty: 180 TABLET | Refills: 1 | Status: SHIPPED | OUTPATIENT
Start: 2025-06-24

## 2025-06-24 RX ORDER — CARBIDOPA AND LEVODOPA 25; 100 MG/1; MG/1
2 TABLET ORAL 3 TIMES DAILY
Qty: 540 TABLET | Refills: 1 | Status: SHIPPED | OUTPATIENT
Start: 2025-06-24

## 2025-06-24 NOTE — PROGRESS NOTES
Follow Up Office Visit      Patient Name: Tristan Hillman  : 1945   MRN: [id number]     Chief Complaint:    Chief Complaint   Patient presents with    Follow-up     Parkinsonism       History of Present Illness: Tristan Hillman is a 80 y.o. male who is here today to follow up with parkinsonism.  He lives at home with his wife who is waiting in the lobby today.  He is accompanied to the OV today by his daughter.  He is currently on carbidopa levodopa  mg 2 tablets 3 times daily.  His daughter states that he seems to do better taking 1 tablet every 4 hours and they have been dividing them up this way.  He denies any falls.  He says he nearly fell but fell back onto the toilet and was able to catch himself.  He ambulates with a rollator for longer distances and uses a cane at home.  He does get tremors in his legs and says he has to sit down pretty quickly or he will fall.  He is able to eat and drink independently.  He is not getting strangled.  He does require some assistance with dressing and bathing.  He sleeps well.  No hallucinations.  He is currently on memantine 5 mg daily for poor short-term memory problems.  He is able to state the month, year and current president.      History of Present Illness: Tristan Hillman is a 80 y.o. male who is here today to follow up with  bradykinesia and parkinsonism.  He was last seen in the clinic on 2025.  He is accompanied to the office visit today by his daughter, Suzanne.  He has been tolerating the carbidopa levodopa  3 times daily well.  His daughter states he is able to move easier after he takes it but she feels that it wears off too quickly.  He denies any falls.  Denies any diplopia.  His daughter states he is not been getting strangled anymore frequently and is only happening occasionally with eating and drinking.  Patient is able to feed himself.  He does require assistance with bathing and dressing.  He lives at home with his wife.  His daughter  helps manage the medications.  He is also under the care of behavioral health.  His daughter is concerned about the Vraylar as she has been reading up on it and has concerns which I directed back to behavioral health as she can voice her concerns with them.  He denies any hallucinations.  He uses the assistance of a rollator walker.  He says he often times holds the brakes on it as it rolls faster than he can walk.  He does have retropulsion.  Still has difficulty going from sitting to standing and turning in the bed or getting out of the bed.  They have a bed rail which helps him.  He also has a shower chair.  He has had PT and OT in the past but is not interested in this right now he still has his bands and things to do exercises he just usually does not participate in any HEP.  His daughter states that his short-term memory is poor and he does have some cognitive decline.  They would be interested in starting some memory enhancement medicine.  Patient is able to state that this month is April and today is Tuesday but he is unable to state the year.        -MRI brain WO 10/7/2024  Diffusion sequences show no signal abnormalities to indicate acute  infarct or other process.     Brain parenchyma displays no evidence of mass, hemorrhage or edema.  There is a stable pattern of periventricular white matter signal change  compatible with chronic small vessel ischemic disease. There is minimal  atrophy. No extra-axial abnormal findings are identified. The ventricles  and cisterns appear normal. Fluid is again seen in the left mastoid air  cells. There is chronic thickening of the sphenoid sinus.     IMPRESSION:  Chronic changes without acute findings.      Pertinent Medical History: CAD-11 stents; hypertension, dyslipidemia, dizziness, small A-fib, hip pain, insomnia, back pain          Subjective      Review of Systems:   Review of Systems   Musculoskeletal:  Positive for gait problem.   Neurological:  Positive for  tremors, weakness and memory problem.       I have reviewed and the following portions of the patient's history were updated as appropriate: past family history, past medical history, past social history, past surgical history and problem list.    Medications:     Current Outpatient Medications:     ALPRAZolam (XANAX) 0.5 MG tablet, Take 1 tablet by mouth At Night As Needed for Anxiety or Sleep., Disp: 30 tablet, Rfl: 0    apixaban (Eliquis) 5 MG tablet tablet, TAKE 1 TABLET BY MOUTH 2 (TWO) TIMES A DAY., Disp: 60 tablet, Rfl: 11    aspirin 81 MG tablet, Take 1 tablet by mouth Daily., Disp: 30 tablet, Rfl: 0    carbidopa-levodopa (SINEMET)  MG per tablet, Take 2 tablets by mouth 3 (Three) Times a Day., Disp: 540 tablet, Rfl: 1    coenzyme Q10 100 MG capsule, Take 1 capsule by mouth Daily., Disp: , Rfl:     escitalopram (LEXAPRO) 10 MG tablet, TAKE ONE (1) TABLET BY MOUTH DAILY, Disp: 90 tablet, Rfl: 0    hydroCHLOROthiazide 12.5 MG tablet, Take 1 tablet by mouth Daily., Disp: , Rfl:     memantine (NAMENDA) 5 MG tablet, Take 1 tablet by mouth 2 (Two) Times a Day., Disp: 180 tablet, Rfl: 1    metroNIDAZOLE (Metrogel) 1 % gel, Apply  topically to the appropriate area as directed Daily., Disp: 60 g, Rfl: 2    multivitamin (MULTI-DAY VITAMINS PO), Take 1 tablet by mouth Daily., Disp: , Rfl:     nitroglycerin (NITROSTAT) 0.4 MG SL tablet, Place 1 tablet under the tongue Every 5 (Five) Minutes As Needed for Chest Pain. Take no more than 3 doses in 15 minutes., Disp: 25 tablet, Rfl: 3    nystatin (MYCOSTATIN) 370955 UNIT/GM powder, Apply  topically to the appropriate area as directed 3 (Three) Times a Day., Disp: 60 g, Rfl: 2    omeprazole (priLOSEC) 20 MG capsule, TAKE 1 CAPSULE BY MOUTH DAILY - MUST HAVE APPOINTMENT FOR FURTHER REFILLS, Disp: 90 capsule, Rfl: 0    rosuvastatin (CRESTOR) 5 MG tablet, Take 1 tablet by mouth Daily., Disp: 90 tablet, Rfl: 3    Allergies:   No Known Allergies    Objective     Physical  "Exam:  Vital Signs:   Vitals:    06/24/25 1531   BP: 130/78   Pulse: 80   Temp: 98.6 °F (37 °C)   SpO2: 96%   Weight: 101 kg (222 lb)   Height: 180.3 cm (71\")   PainSc: 0-No pain     Body mass index is 30.96 kg/m².    Physical Exam  Constitutional:       Appearance: Normal appearance.   HENT:      Head: Normocephalic.   Eyes:      Conjunctiva/sclera: Conjunctivae normal.   Pulmonary:      Effort: Pulmonary effort is normal.   Musculoskeletal:         General: Normal range of motion.   Skin:     General: Skin is warm and dry.   Neurological:      General: No focal deficit present.      Mental Status: He is alert and oriented to person, place, and time.      Cranial Nerves: Cranial nerves 2-12 are intact. No dysarthria.      Motor: Weakness present.      Gait: Gait abnormal.      Comments: Patient goes from sitting to standing on third attempt.  He is weak on his feet.  He walks with a slowed short stride gait.  He scuffs his toes when walking with his rollator walker.  After walking about 15 feet patient begins to have weakness and trouble to get his lower extremities but is able to pause for a few seconds and then begin to ambulate again.   Psychiatric:         Attention and Perception: Attention normal.         Mood and Affect: Mood and affect normal.         Speech: Speech normal.         Behavior: Behavior normal. Behavior is cooperative.         Thought Content: Thought content normal.         Cognition and Memory: Cognition is impaired. Memory is impaired.         Judgment: Judgment normal.         Neurological Exam  Mental Status  Alert. Oriented only to person, place, time and situation. Orientation: He can correctly state the month and year.. Oriented to person, place, and time. Speech is normal. no dysarthria present. Language is fluent with no aphasia.  Patient is hard of hearing.    Gait   Abnormal gait.      Assessment / Plan      Assessment/Plan:   Diagnoses and all orders for this visit:    1. " Parkinsonism, unspecified Parkinsonism type  -     carbidopa-levodopa (SINEMET)  MG per tablet; Take 2 tablets by mouth 3 (Three) Times a Day.  Dispense: 540 tablet; Refill: 1    2. Subjective memory complaints  -     memantine (NAMENDA) 5 MG tablet; Take 1 tablet by mouth 2 (Two) Times a Day.  Dispense: 180 tablet; Refill: 1               Carbidopa-levodopa refilled without change.  Will increase memantine 5 mg daily to twice daily dosing.  Discussed changes with patient and his daughter.  She verbalizes understanding.  Patient was also counseled on increasing his activity and using exercise bands or even doing light weights while in a seated position.  Patient will call in the interim if she has any questions or concerns or changes.    Follow Up:   Return in about 6 months (around 12/24/2025).    LOUISE Holder, FNP-[facility]

## 2025-07-08 DIAGNOSIS — G47.00 INSOMNIA, UNSPECIFIED TYPE: ICD-10-CM

## 2025-07-08 DIAGNOSIS — F41.1 GENERALIZED ANXIETY DISORDER: ICD-10-CM

## 2025-07-08 NOTE — TELEPHONE ENCOUNTER
Rx Refill Note  Requested Prescriptions     Pending Prescriptions Disp Refills    ALPRAZolam (XANAX) 0.5 MG tablet [Pharmacy Med Name: ALPRAZOLAM 0.5MG TABLET] 30 tablet 0     Sig: TAKE ONE (1) TABLET BY MOUTH AT NIGHT AS NEEDED FOR ANXIETY OR SLEEP      Last office visit with prescribing clinician: 10/24/2024   Last telemedicine visit with prescribing clinician: 2/25/2025   Next office visit with prescribing clinician: 8/27/2025                         Would you like a call back once the refill request has been completed: [] Yes [] No    If the office needs to give you a call back, can they leave a voicemail: [] Yes [] No    Diane Barnett MA  07/08/25, 14:21 EDT

## 2025-07-09 RX ORDER — OMEPRAZOLE 20 MG/1
CAPSULE, DELAYED RELEASE ORAL
Qty: 90 CAPSULE | Refills: 3 | OUTPATIENT
Start: 2025-07-09

## 2025-07-09 RX ORDER — ROSUVASTATIN CALCIUM 5 MG/1
5 TABLET, COATED ORAL DAILY
Qty: 90 TABLET | Refills: 3 | OUTPATIENT
Start: 2025-07-09

## 2025-07-09 RX ORDER — ESCITALOPRAM OXALATE 10 MG/1
10 TABLET ORAL DAILY
Qty: 90 TABLET | Refills: 2 | Status: SHIPPED | OUTPATIENT
Start: 2025-07-09

## 2025-07-09 RX ORDER — ALPRAZOLAM 0.5 MG
TABLET ORAL
Qty: 30 TABLET | Refills: 0 | Status: SHIPPED | OUTPATIENT
Start: 2025-07-09

## 2025-07-11 RX ORDER — ROSUVASTATIN CALCIUM 5 MG/1
5 TABLET, COATED ORAL DAILY
Qty: 90 TABLET | Refills: 3 | Status: SHIPPED | OUTPATIENT
Start: 2025-07-11

## 2025-07-11 RX ORDER — OMEPRAZOLE 20 MG/1
CAPSULE, DELAYED RELEASE ORAL
Qty: 90 CAPSULE | Refills: 3 | Status: SHIPPED | OUTPATIENT
Start: 2025-07-11

## 2025-07-24 ENCOUNTER — OFFICE VISIT (OUTPATIENT)
Dept: CARDIOLOGY | Facility: CLINIC | Age: 80
End: 2025-07-24
Payer: MEDICARE

## 2025-07-24 VITALS
SYSTOLIC BLOOD PRESSURE: 120 MMHG | BODY MASS INDEX: 30.94 KG/M2 | HEIGHT: 71 IN | WEIGHT: 221 LBS | HEART RATE: 60 BPM | DIASTOLIC BLOOD PRESSURE: 80 MMHG | OXYGEN SATURATION: 96 %

## 2025-07-24 DIAGNOSIS — Z91.81 AT HIGH RISK FOR FALLS: ICD-10-CM

## 2025-07-24 DIAGNOSIS — I10 PRIMARY HYPERTENSION: ICD-10-CM

## 2025-07-24 DIAGNOSIS — I25.10 CORONARY ARTERY DISEASE INVOLVING NATIVE CORONARY ARTERY OF NATIVE HEART WITHOUT ANGINA PECTORIS: Primary | ICD-10-CM

## 2025-07-24 DIAGNOSIS — I48.0 PAROXYSMAL ATRIAL FIBRILLATION: ICD-10-CM

## 2025-07-24 DIAGNOSIS — E78.5 DYSLIPIDEMIA: ICD-10-CM

## 2025-07-24 DIAGNOSIS — I70.213 ATHEROSCLEROSIS OF NATIVE ARTERIES OF EXTREMITIES WITH INTERMITTENT CLAUDICATION, BILATERAL LEGS: ICD-10-CM

## 2025-07-24 PROCEDURE — 1160F RVW MEDS BY RX/DR IN RCRD: CPT | Performed by: INTERNAL MEDICINE

## 2025-07-24 PROCEDURE — 1159F MED LIST DOCD IN RCRD: CPT | Performed by: INTERNAL MEDICINE

## 2025-07-24 PROCEDURE — 3074F SYST BP LT 130 MM HG: CPT | Performed by: INTERNAL MEDICINE

## 2025-07-24 PROCEDURE — 3079F DIAST BP 80-89 MM HG: CPT | Performed by: INTERNAL MEDICINE

## 2025-07-24 PROCEDURE — 99214 OFFICE O/P EST MOD 30 MIN: CPT | Performed by: INTERNAL MEDICINE

## 2025-07-24 NOTE — PATIENT INSTRUCTIONS
Fall Prevention in the Home, Adult  Falls can cause injuries and affect people of all ages. There are many simple things that you can do to make your home safe and to help prevent falls.  If you need it, ask for help making these changes.  What actions can I take to prevent falls?  General information  Use good lighting in all rooms. Make sure to:  Replace any light bulbs that burn out.  Turn on lights if it is dark and use night-lights.  Keep items that you use often in easy-to-reach places. Lower the shelves around your home if needed.  Move furniture so that there are clear paths around it.  Do not keep throw rugs or other things on the floor that can make you trip.  If any of your floors are uneven, fix them.  Add color or contrast paint or tape to clearly maty and help you see:  Grab bars or handrails.  First and last steps of staircases.  Where the edge of each step is.  If you use a ladder or stepladder:  Make sure that it is fully opened. Do not climb a closed ladder.  Make sure the sides of the ladder are locked in place.  Have someone hold the ladder while you use it.  Know where your pets are as you move through your home.  What can I do in the bathroom?         Keep the floor dry. Clean up any water that is on the floor right away.  Remove soap buildup in the bathtub or shower. Buildup makes bathtubs and showers slippery.  Use non-skid mats or decals on the floor of the bathtub or shower.  Attach bath mats securely with double-sided, non-slip rug tape.  If you need to sit down while you are in the shower, use a non-slip stool.  Install grab bars by the toilet and in the bathtub and shower. Do not use towel bars as grab bars.  What can I do in the bedroom?  Make sure that you have a light by your bed that is easy to reach.  Do not use any sheets or blankets on your bed that hang to the floor.  Have a firm bench or chair with side arms that you can use for support when you get dressed.  What can I do in  the kitchen?  Clean up any spills right away.  If you need to reach something above you, use a sturdy step stool that has a grab bar.  Keep electrical cables out of the way.  Do not use floor polish or wax that makes floors slippery.  What can I do with my stairs?  Do not leave anything on the stairs.  Make sure that you have a light switch at the top and the bottom of the stairs. Have them installed if you do not have them.  Make sure that there are handrails on both sides of the stairs. Fix handrails that are broken or loose. Make sure that handrails are as long as the staircases.  Install non-slip stair treads on all stairs in your home if they do not have carpet.  Avoid having throw rugs at the top or bottom of stairs, or secure the rugs with carpet tape to prevent them from moving.  Choose a carpet design that does not hide the edge of steps on the stairs. Make sure that carpet is firmly attached to the stairs. Fix any carpet that is loose or worn.  What can I do on the outside of my home?  Use bright outdoor lighting.  Repair the edges of walkways and driveways and fix any cracks. Clear paths of anything that can make you trip, such as tools or rocks.  Add color or contrast paint or tape to clearly maty and help you see high doorway thresholds.  Trim any bushes or trees on the main path into your home.  Check that handrails are securely fastened and in good repair. Both sides of all steps should have handrails.  Install guardrails along the edges of any raised decks or porches.  Have leaves, snow, and ice cleared regularly. Use sand, salt, or ice melt on walkways during winter months if you live where there is ice and snow.  In the garage, clean up any spills right away, including grease or oil spills.  What other actions can I take?  Review your medicines with your health care provider. Some medicines can make you confused or feel dizzy. This can increase your chance of falling.  Wear closed-toe shoes that  fit well and support your feet. Wear shoes that have rubber soles and low heels.  Use a cane, walker, scooter, or crutches that help you move around if needed.  Talk with your provider about other ways that you can decrease your risk of falls. This may include seeing a physical therapist to learn to do exercises to improve movement and strength.  Where to find more information  Centers for Disease Control and Prevention, DINO: cdc.gov  National Whitehouse on Aging: anahy.nih.gov  National Whitehouse on Aging: anahy.nih.gov  Contact a health care provider if:  You are afraid of falling at home.  You feel weak, drowsy, or dizzy at home.  You fall at home.  Get help right away if you:  Lose consciousness or have trouble moving after a fall.  Have a fall that causes a head injury.  These symptoms may be an emergency. Get help right away. Call 911.  Do not wait to see if the symptoms will go away.  Do not drive yourself to the hospital.  This information is not intended to replace advice given to you by your health care provider. Make sure you discuss any questions you have with your health care provider.  Document Revised: 08/21/2023 Document Reviewed: 08/21/2023  Elsevier Patient Education © 2024 [employer]

## 2025-07-24 NOTE — PROGRESS NOTES
Subjective:     Encounter Date:07/24/2025      Patient ID: Trsitan Hillman is a 80 y.o. male.    Chief Complaint: Coronary artery disease  HPI  This is a 80-year-old male patient who presents to cardiology clinic for routine follow-up.  Since his last visit he has been diagnosed with suspected Parkinson's disease.  He has been started on Sinemet.  His primary complaints are related to difficulty with ambulation and muscle stiffness.  He has not yet developed a resting tremor.  He remains a non-smoker.  He reports compliance with his medications with no perceived side effects.  The following portions of the patient's history were reviewed and updated as appropriate: allergies, current medications, past family history, past medical history, past social history, past surgical history and problem  Review of Systems   Constitutional: Negative for chills, diaphoresis, fever, malaise/fatigue, weight gain and weight loss.   HENT:  Negative for ear discharge, hearing loss, hoarse voice and nosebleeds.    Eyes:  Negative for discharge, double vision, pain and photophobia.   Cardiovascular:  Negative for chest pain, claudication, cyanosis, dyspnea on exertion, irregular heartbeat, leg swelling, near-syncope, orthopnea, palpitations, paroxysmal nocturnal dyspnea and syncope.   Respiratory:  Negative for cough, hemoptysis, sputum production and wheezing.    Endocrine: Negative for cold intolerance, heat intolerance, polydipsia, polyphagia and polyuria.   Hematologic/Lymphatic: Negative for adenopathy and bleeding problem. Does not bruise/bleed easily.   Skin:  Negative for color change, flushing, itching and rash.   Musculoskeletal:  Negative for muscle cramps, muscle weakness, myalgias and stiffness.   Gastrointestinal:  Negative for abdominal pain, diarrhea, hematemesis, hematochezia, nausea and vomiting.   Genitourinary:  Negative for dysuria, frequency and nocturia.   Neurological:  Negative for focal weakness, loss of  balance, numbness, paresthesias and seizures.   Psychiatric/Behavioral:  Negative for altered mental status, hallucinations and suicidal ideas.    Allergic/Immunologic: Negative for HIV exposure, hives and persistent infections.           Current Outpatient Medications:     ALPRAZolam (XANAX) 0.5 MG tablet, TAKE ONE (1) TABLET BY MOUTH AT NIGHT AS NEEDED FOR ANXIETY OR SLEEP, Disp: 30 tablet, Rfl: 0    apixaban (Eliquis) 5 MG tablet tablet, TAKE 1 TABLET BY MOUTH 2 (TWO) TIMES A DAY., Disp: 60 tablet, Rfl: 11    carbidopa-levodopa (SINEMET)  MG per tablet, Take 2 tablets by mouth 3 (Three) Times a Day., Disp: 540 tablet, Rfl: 1    coenzyme Q10 100 MG capsule, Take 1 capsule by mouth Daily., Disp: , Rfl:     escitalopram (LEXAPRO) 10 MG tablet, TAKE ONE (1) TABLET BY MOUTH DAILY, Disp: 90 tablet, Rfl: 2    hydroCHLOROthiazide 12.5 MG tablet, Take 1 tablet by mouth Daily., Disp: , Rfl:     memantine (NAMENDA) 5 MG tablet, Take 1 tablet by mouth 2 (Two) Times a Day., Disp: 180 tablet, Rfl: 1    metroNIDAZOLE (Metrogel) 1 % gel, Apply  topically to the appropriate area as directed Daily., Disp: 60 g, Rfl: 2    multivitamin (MULTI-DAY VITAMINS PO), Take 1 tablet by mouth Daily., Disp: , Rfl:     nitroglycerin (NITROSTAT) 0.4 MG SL tablet, Place 1 tablet under the tongue Every 5 (Five) Minutes As Needed for Chest Pain. Take no more than 3 doses in 15 minutes., Disp: 25 tablet, Rfl: 3    nystatin (MYCOSTATIN) 687411 UNIT/GM powder, Apply  topically to the appropriate area as directed 3 (Three) Times a Day., Disp: 60 g, Rfl: 2    omeprazole (priLOSEC) 20 MG capsule, TAKE 1 CAPSULE BY MOUTH DAILY - MUST HAVE APPOINTMENT FOR FURTHER REFILLS, Disp: 90 capsule, Rfl: 3    rosuvastatin (CRESTOR) 5 MG tablet, TAKE ONE (1) TABLET BY MOUTH EVERY DAY, Disp: 90 tablet, Rfl: 3    Objective:   Vitals and nursing note reviewed.   Constitutional:       Appearance: Healthy appearance. Not in distress.   Neck:      Vascular: No JVR.  "JVD normal.   Pulmonary:      Effort: Pulmonary effort is normal.      Breath sounds: Normal breath sounds. No wheezing. No rhonchi. No rales.   Chest:      Chest wall: Not tender to palpatation.   Cardiovascular:      PMI at left midclavicular line. Normal rate. Regular rhythm. Normal S1. Normal S2.       Murmurs: There is no murmur.      No gallop.  No click. No rub.   Pulses:     Intact distal pulses.   Edema:     Peripheral edema absent.   Abdominal:      General: Bowel sounds are normal.      Palpations: Abdomen is soft.      Tenderness: There is no abdominal tenderness.   Musculoskeletal: Normal range of motion.         General: No tenderness. Skin:     General: Skin is warm and dry.   Neurological:      General: No focal deficit present.      Mental Status: Alert and oriented to person, place and time.       Blood pressure 120/80, pulse 60, height 180.3 cm (71\"), weight 100 kg (221 lb), SpO2 96%.   Lab Review:     Assessment:       1. Coronary artery disease involving native coronary artery of native heart without angina pectoris      2. Dyslipidemia  Tolerating statin based cholesterol-lowering therapy without side effects.    3. Paroxysmal atrial fibrillation  Asymptomatic.  Rate control and anticoagulation strategy.  No bleeding complications related to anticoagulation with DOAC.  No signs or symptoms to suggest cardioembolic event.    4. Primary hypertension  Acceptable blood pressure control.    5. Atherosclerosis of native arteries of extremities with intermittent claudication, bilateral legs  No lifestyle limiting claudication or evidence of chronic critical limb ischemia.    6. At high risk for falls  He is currently ambulating with the assistance of a cane.  He is under the care of a neurologist for his suspected Parkinson's disease.    Procedures    Plan:     Advance Care Planning   ACP discussion was held with the patient during this visit. Patient has an advance directive (not in EMR), copy " requested.     No changes in medications made at today's visit.    No cardiovascular testing warranted at this time.

## 2025-07-29 RX ORDER — APIXABAN 5 MG/1
5 TABLET, FILM COATED ORAL EVERY 12 HOURS SCHEDULED
Qty: 60 TABLET | Refills: 10 | Status: SHIPPED | OUTPATIENT
Start: 2025-07-29

## 2025-08-01 ENCOUNTER — OFFICE VISIT (OUTPATIENT)
Dept: FAMILY MEDICINE CLINIC | Facility: CLINIC | Age: 80
End: 2025-08-01
Payer: MEDICARE

## 2025-08-01 VITALS
RESPIRATION RATE: 18 BRPM | SYSTOLIC BLOOD PRESSURE: 108 MMHG | HEART RATE: 76 BPM | DIASTOLIC BLOOD PRESSURE: 64 MMHG | BODY MASS INDEX: 31.27 KG/M2 | HEIGHT: 71 IN | OXYGEN SATURATION: 97 % | WEIGHT: 223.4 LBS

## 2025-08-01 DIAGNOSIS — G47.00 INSOMNIA, UNSPECIFIED TYPE: ICD-10-CM

## 2025-08-01 DIAGNOSIS — I10 PRIMARY HYPERTENSION: ICD-10-CM

## 2025-08-01 DIAGNOSIS — E78.5 DYSLIPIDEMIA: ICD-10-CM

## 2025-08-01 DIAGNOSIS — F41.1 GENERALIZED ANXIETY DISORDER: Primary | ICD-10-CM

## 2025-08-01 DIAGNOSIS — H93.90 EAR LESION: ICD-10-CM

## 2025-08-01 DIAGNOSIS — K21.9 GASTROESOPHAGEAL REFLUX DISEASE WITHOUT ESOPHAGITIS: ICD-10-CM

## 2025-08-01 RX ORDER — ALPRAZOLAM 0.5 MG
TABLET ORAL
Qty: 45 TABLET | Refills: 1 | Status: SHIPPED | OUTPATIENT
Start: 2025-08-01

## 2025-08-01 NOTE — PROGRESS NOTES
Subjective     Chief Complaint:    Chief Complaint   Patient presents with    Heartburn    Hypertension       History of Present Illness:   Dizziness - Still the same  Rash around mouth and scabs on nose previously - cleared at this time.   Parkinsomnism, following with neuro on sinemet and namenda - family reports that memory is better but does have certain days where there are issues.   Has anxiety/insmonia/depression/mood, on prn xanax. Still with some daytime anxiety  Lexapro daily  HTN/CHF/PAF, follows with cards, not needing BP pill at this time, uses hctz to help with swelling every other day.   Does not take hctz all the time as he experiences incontinent episodes.   Having issues with walking dur to right big toe pain.   Has spot on his ear and nose that he is concerned about the possibility of cancer.   GERD, currently on omeprazole. Family states that this is working well for him when he takes it.     Review of Systems  Gen- No fevers, chills  CV- No chest pain, palpitations  Resp- No cough, dyspnea  GI- No N/V/D, abd pain  Neuro-No  headaches      I have reviewed and/or updated the patient's past medical, surgical, family, social history and problem list as appropriate.     Medications:    Current Outpatient Medications:     ALPRAZolam (XANAX) 0.5 MG tablet, 1/2 po AM for breakthrough anxiety, 1 po HS, Disp: 45 tablet, Rfl: 1    carbidopa-levodopa (SINEMET)  MG per tablet, Take 2 tablets by mouth 3 (Three) Times a Day., Disp: 540 tablet, Rfl: 1    coenzyme Q10 100 MG capsule, Take 1 capsule by mouth Daily., Disp: , Rfl:     Eliquis 5 MG tablet tablet, TAKE ONE (1) TABLET BY MOUTH TWICE DAILY, Disp: 60 tablet, Rfl: 10    escitalopram (LEXAPRO) 10 MG tablet, TAKE ONE (1) TABLET BY MOUTH DAILY, Disp: 90 tablet, Rfl: 2    hydroCHLOROthiazide 12.5 MG tablet, Take 1 tablet by mouth Daily., Disp: , Rfl:     memantine (NAMENDA) 5 MG tablet, Take 1 tablet by mouth 2 (Two) Times a Day., Disp: 180  "tablet, Rfl: 1    metroNIDAZOLE (Metrogel) 1 % gel, Apply  topically to the appropriate area as directed Daily., Disp: 60 g, Rfl: 2    multivitamin (MULTI-DAY VITAMINS PO), Take 1 tablet by mouth Daily., Disp: , Rfl:     nitroglycerin (NITROSTAT) 0.4 MG SL tablet, Place 1 tablet under the tongue Every 5 (Five) Minutes As Needed for Chest Pain. Take no more than 3 doses in 15 minutes., Disp: 25 tablet, Rfl: 3    nystatin (MYCOSTATIN) 525875 UNIT/GM powder, Apply  topically to the appropriate area as directed 3 (Three) Times a Day., Disp: 60 g, Rfl: 2    omeprazole (priLOSEC) 20 MG capsule, TAKE 1 CAPSULE BY MOUTH DAILY - MUST HAVE APPOINTMENT FOR FURTHER REFILLS, Disp: 90 capsule, Rfl: 3    rosuvastatin (CRESTOR) 5 MG tablet, TAKE ONE (1) TABLET BY MOUTH EVERY DAY, Disp: 90 tablet, Rfl: 3    Allergies:  No Known Allergies    Objective     Vital Signs:   Vitals:    08/01/25 1337   BP: 108/64   Pulse: 76   Resp: 18   SpO2: 97%   Weight: 101 kg (223 lb 6.4 oz)   Height: 180.3 cm (71\")     Body mass index is 31.16 kg/m².    Physical Exam:    Physical Exam  Constitutional:       Appearance: Normal appearance.   HENT:      Head: Normocephalic and atraumatic.      Left Ear: Tympanic membrane normal.      Ears:        Comments: Raised, red area. Pt reports occasional bleeding, has been there for months.      Nose: Nasal tenderness present.        Comments: Pt states this is occasionally bleeding. Superficial veins on top of nose.      Mouth/Throat:      Mouth: Mucous membranes are moist.      Pharynx: Oropharynx is clear.   Eyes:      Pupils: Pupils are equal, round, and reactive to light.   Neck:      Vascular: No carotid bruit.   Cardiovascular:      Rate and Rhythm: Normal rate. Rhythm irregular.      Pulses:           Dorsalis pedis pulses are 1+ on the right side and 1+ on the left side.      Heart sounds: Normal heart sounds.   Pulmonary:      Effort: Pulmonary effort is normal.      Breath sounds: Normal breath " sounds.   Abdominal:      General: Abdomen is flat. Bowel sounds are normal.      Palpations: Abdomen is soft.   Musculoskeletal:      Right lower le+ Pitting Edema present.      Left lower le+ Pitting Edema present.   Skin:     General: Skin is warm and dry.   Neurological:      General: No focal deficit present.      Mental Status: He is alert and oriented to person, place, and time. Mental status is at baseline.   Psychiatric:         Mood and Affect: Mood normal.         Behavior: Behavior normal.         Thought Content: Thought content normal.         Judgment: Judgment normal.         Assessment / Plan     Assessment/Plan:   Problem List Items Addressed This Visit       Generalized anxiety disorder - Primary    Relevant Medications    memantine (NAMENDA) 5 MG tablet    escitalopram (LEXAPRO) 10 MG tablet    ALPRAZolam (XANAX) 0.5 MG tablet    Gastroesophageal reflux disease without esophagitis    Relevant Medications    multivitamin (MULTI-DAY VITAMINS PO)    omeprazole (priLOSEC) 20 MG capsule    Dyslipidemia    Relevant Medications    rosuvastatin (CRESTOR) 5 MG tablet    Other Relevant Orders    Comprehensive Metabolic Panel    Primary hypertension    Relevant Medications    hydroCHLOROthiazide 12.5 MG tablet    nitroglycerin (NITROSTAT) 0.4 MG SL tablet    Other Relevant Orders    Comprehensive Metabolic Panel    Insomnia    Relevant Medications    carbidopa-levodopa (SINEMET)  MG per tablet    escitalopram (LEXAPRO) 10 MG tablet    ALPRAZolam (XANAX) 0.5 MG tablet     Other Visit Diagnoses         Ear lesion        Relevant Orders    Ambulatory Referral to Dermatology          --Referral for dermatology for nose and ear lesions.   --Continue current medications.   -- Increased xanax for anxiety episodes in between xanax doses.   --Follow CMP  --Continue follow ups with specialists.       Discussed plan of care in detail with pt today; pt verb understanding and agrees.    Follow up:  As  scheduled for [facility] in December      Electronically signed by LOUISE Grissom   08/01/2025 13:37 EDT      Please note that portions of this note were completed with a voice recognition program.   I, Kerri GIL, personally performed the services described in this documentation, as scribed by Bryanna Patel, LOUISE Student in my presence, and is both accurate and complete

## 2025-08-02 LAB
ALBUMIN SERPL-MCNC: 4.1 G/DL (ref 3.5–5.2)
ALBUMIN/GLOB SERPL: 1.7 G/DL
ALP SERPL-CCNC: 72 U/L (ref 39–117)
ALT SERPL-CCNC: 7 U/L (ref 1–41)
AST SERPL-CCNC: 21 U/L (ref 1–40)
BILIRUB SERPL-MCNC: <0.2 MG/DL (ref 0–1.2)
BUN SERPL-MCNC: 16 MG/DL (ref 8–23)
BUN/CREAT SERPL: 16.8 (ref 7–25)
CALCIUM SERPL-MCNC: 9.1 MG/DL (ref 8.6–10.5)
CHLORIDE SERPL-SCNC: 105 MMOL/L (ref 98–107)
CO2 SERPL-SCNC: 26.2 MMOL/L (ref 22–29)
CREAT SERPL-MCNC: 0.95 MG/DL (ref 0.76–1.27)
EGFRCR SERPLBLD CKD-EPI 2021: 81.9 ML/MIN/1.73
GLOBULIN SER CALC-MCNC: 2.4 GM/DL
GLUCOSE SERPL-MCNC: 78 MG/DL (ref 65–99)
POTASSIUM SERPL-SCNC: 3.9 MMOL/L (ref 3.5–5.2)
PROT SERPL-MCNC: 6.5 G/DL (ref 6–8.5)
SODIUM SERPL-SCNC: 142 MMOL/L (ref 136–145)